# Patient Record
Sex: FEMALE | Race: WHITE | Employment: OTHER | URBAN - METROPOLITAN AREA
[De-identification: names, ages, dates, MRNs, and addresses within clinical notes are randomized per-mention and may not be internally consistent; named-entity substitution may affect disease eponyms.]

---

## 2017-01-11 ENCOUNTER — TELEPHONE (OUTPATIENT)
Dept: FAMILY MEDICINE CLINIC | Age: 82
End: 2017-01-11

## 2017-01-11 RX ORDER — CIPROFLOXACIN 250 MG/1
250 TABLET, FILM COATED ORAL 2 TIMES DAILY
Qty: 14 TABLET | Refills: 0 | Status: SHIPPED | OUTPATIENT
Start: 2017-01-11 | End: 2017-01-18

## 2017-02-10 ENCOUNTER — OFFICE VISIT (OUTPATIENT)
Dept: CARDIOLOGY | Age: 82
End: 2017-02-10

## 2017-02-10 VITALS
WEIGHT: 136 LBS | RESPIRATION RATE: 12 BRPM | HEIGHT: 65 IN | SYSTOLIC BLOOD PRESSURE: 122 MMHG | DIASTOLIC BLOOD PRESSURE: 64 MMHG | HEART RATE: 72 BPM | BODY MASS INDEX: 22.66 KG/M2

## 2017-02-10 DIAGNOSIS — E53.8 VITAMIN B 12 DEFICIENCY: ICD-10-CM

## 2017-02-10 DIAGNOSIS — E78.5 HYPERLIPIDEMIA, UNSPECIFIED HYPERLIPIDEMIA TYPE: ICD-10-CM

## 2017-02-10 DIAGNOSIS — F41.9 ANXIETY: ICD-10-CM

## 2017-02-10 DIAGNOSIS — E03.9 ACQUIRED HYPOTHYROIDISM: Primary | Chronic | ICD-10-CM

## 2017-02-10 DIAGNOSIS — E11.9 TYPE 2 DIABETES MELLITUS WITHOUT COMPLICATION, WITHOUT LONG-TERM CURRENT USE OF INSULIN (HCC): ICD-10-CM

## 2017-02-10 DIAGNOSIS — I35.1 MILD AORTIC REGURGITATION: ICD-10-CM

## 2017-02-10 PROCEDURE — 1090F PRES/ABSN URINE INCON ASSESS: CPT | Performed by: INTERNAL MEDICINE

## 2017-02-10 PROCEDURE — 1123F ACP DISCUSS/DSCN MKR DOCD: CPT | Performed by: INTERNAL MEDICINE

## 2017-02-10 PROCEDURE — 4040F PNEUMOC VAC/ADMIN/RCVD: CPT | Performed by: INTERNAL MEDICINE

## 2017-02-10 PROCEDURE — G8427 DOCREV CUR MEDS BY ELIG CLIN: HCPCS | Performed by: INTERNAL MEDICINE

## 2017-02-10 PROCEDURE — 1036F TOBACCO NON-USER: CPT | Performed by: INTERNAL MEDICINE

## 2017-02-10 PROCEDURE — G8419 CALC BMI OUT NRM PARAM NOF/U: HCPCS | Performed by: INTERNAL MEDICINE

## 2017-02-10 PROCEDURE — G8484 FLU IMMUNIZE NO ADMIN: HCPCS | Performed by: INTERNAL MEDICINE

## 2017-02-10 PROCEDURE — G8400 PT W/DXA NO RESULTS DOC: HCPCS | Performed by: INTERNAL MEDICINE

## 2017-02-10 PROCEDURE — 99213 OFFICE O/P EST LOW 20 MIN: CPT | Performed by: INTERNAL MEDICINE

## 2017-02-27 DIAGNOSIS — E78.5 HYPERLIPIDEMIA, UNSPECIFIED HYPERLIPIDEMIA TYPE: ICD-10-CM

## 2017-02-27 DIAGNOSIS — E11.9 TYPE 2 DIABETES MELLITUS WITHOUT COMPLICATION, WITHOUT LONG-TERM CURRENT USE OF INSULIN (HCC): ICD-10-CM

## 2017-02-27 DIAGNOSIS — E03.9 ACQUIRED HYPOTHYROIDISM: Chronic | ICD-10-CM

## 2017-02-27 DIAGNOSIS — R53.83 FATIGUE, UNSPECIFIED TYPE: ICD-10-CM

## 2017-02-27 DIAGNOSIS — I10 ESSENTIAL HYPERTENSION: ICD-10-CM

## 2017-02-27 DIAGNOSIS — I10 ESSENTIAL HYPERTENSION: Primary | ICD-10-CM

## 2017-02-27 LAB
ALBUMIN SERPL-MCNC: 4.4 G/DL (ref 3.9–4.9)
ALP BLD-CCNC: 79 U/L (ref 40–130)
ALT SERPL-CCNC: 11 U/L (ref 0–33)
ANION GAP SERPL CALCULATED.3IONS-SCNC: 9 MEQ/L (ref 7–13)
AST SERPL-CCNC: 16 U/L (ref 0–35)
BASOPHILS ABSOLUTE: 0.1 K/UL (ref 0–0.2)
BASOPHILS RELATIVE PERCENT: 1.1 %
BILIRUB SERPL-MCNC: 0.6 MG/DL (ref 0–1.2)
BUN BLDV-MCNC: 9 MG/DL (ref 8–23)
CALCIUM SERPL-MCNC: 9.5 MG/DL (ref 8.6–10.2)
CHLORIDE BLD-SCNC: 94 MEQ/L (ref 98–107)
CHOLESTEROL, TOTAL: 127 MG/DL (ref 0–199)
CO2: 29 MEQ/L (ref 22–29)
CREAT SERPL-MCNC: 0.5 MG/DL (ref 0.5–0.9)
CREATININE URINE: 121.2 MG/DL
EOSINOPHILS ABSOLUTE: 0.2 K/UL (ref 0–0.7)
EOSINOPHILS RELATIVE PERCENT: 3.2 %
GFR AFRICAN AMERICAN: >60
GFR NON-AFRICAN AMERICAN: >60
GLOBULIN: 2.3 G/DL (ref 2.3–3.5)
GLUCOSE BLD-MCNC: 104 MG/DL (ref 74–109)
HBA1C MFR BLD: 6 % (ref 4.8–5.9)
HCT VFR BLD CALC: 36.8 % (ref 37–47)
HDLC SERPL-MCNC: 50 MG/DL (ref 40–59)
HEMOGLOBIN: 13 G/DL (ref 12–16)
LDL CHOLESTEROL CALCULATED: 58 MG/DL (ref 0–129)
LYMPHOCYTES ABSOLUTE: 1.4 K/UL (ref 1–4.8)
LYMPHOCYTES RELATIVE PERCENT: 26.9 %
MCH RBC QN AUTO: 30.3 PG (ref 27–31.3)
MCHC RBC AUTO-ENTMCNC: 35.4 % (ref 33–37)
MCV RBC AUTO: 85.6 FL (ref 82–100)
MICROALBUMIN UR-MCNC: <1.2 MG/DL
MICROALBUMIN/CREAT UR-RTO: NORMAL MG/G (ref 0–30)
MONOCYTES ABSOLUTE: 0.4 K/UL (ref 0.2–0.8)
MONOCYTES RELATIVE PERCENT: 7.4 %
NEUTROPHILS ABSOLUTE: 3.1 K/UL (ref 1.4–6.5)
NEUTROPHILS RELATIVE PERCENT: 61.4 %
PDW BLD-RTO: 12.9 % (ref 11.5–14.5)
PLATELET # BLD: 253 K/UL (ref 130–400)
POTASSIUM SERPL-SCNC: 4.7 MEQ/L (ref 3.5–5.1)
RBC # BLD: 4.3 M/UL (ref 4.2–5.4)
SODIUM BLD-SCNC: 132 MEQ/L (ref 132–144)
T4 FREE: 1.52 NG/DL (ref 0.93–1.7)
TOTAL PROTEIN: 6.7 G/DL (ref 6.4–8.1)
TRIGL SERPL-MCNC: 94 MG/DL (ref 0–200)
TSH SERPL DL<=0.05 MIU/L-ACNC: 0.31 UIU/ML (ref 0.27–4.2)
WBC # BLD: 5.1 K/UL (ref 4.8–10.8)

## 2017-03-03 ENCOUNTER — OFFICE VISIT (OUTPATIENT)
Dept: FAMILY MEDICINE CLINIC | Age: 82
End: 2017-03-03

## 2017-03-03 VITALS
BODY MASS INDEX: 22.14 KG/M2 | RESPIRATION RATE: 16 BRPM | DIASTOLIC BLOOD PRESSURE: 78 MMHG | WEIGHT: 137.8 LBS | SYSTOLIC BLOOD PRESSURE: 124 MMHG | HEIGHT: 66 IN | HEART RATE: 72 BPM | TEMPERATURE: 97.4 F

## 2017-03-03 DIAGNOSIS — E11.9 TYPE 2 DIABETES MELLITUS WITHOUT COMPLICATION, WITHOUT LONG-TERM CURRENT USE OF INSULIN (HCC): Primary | ICD-10-CM

## 2017-03-03 DIAGNOSIS — E78.5 HYPERLIPIDEMIA, UNSPECIFIED HYPERLIPIDEMIA TYPE: ICD-10-CM

## 2017-03-03 DIAGNOSIS — E03.9 ACQUIRED HYPOTHYROIDISM: Chronic | ICD-10-CM

## 2017-03-03 DIAGNOSIS — I10 ESSENTIAL HYPERTENSION: ICD-10-CM

## 2017-03-03 PROCEDURE — 1090F PRES/ABSN URINE INCON ASSESS: CPT | Performed by: FAMILY MEDICINE

## 2017-03-03 PROCEDURE — G8484 FLU IMMUNIZE NO ADMIN: HCPCS | Performed by: FAMILY MEDICINE

## 2017-03-03 PROCEDURE — 1123F ACP DISCUSS/DSCN MKR DOCD: CPT | Performed by: FAMILY MEDICINE

## 2017-03-03 PROCEDURE — G8419 CALC BMI OUT NRM PARAM NOF/U: HCPCS | Performed by: FAMILY MEDICINE

## 2017-03-03 PROCEDURE — 4040F PNEUMOC VAC/ADMIN/RCVD: CPT | Performed by: FAMILY MEDICINE

## 2017-03-03 PROCEDURE — G8427 DOCREV CUR MEDS BY ELIG CLIN: HCPCS | Performed by: FAMILY MEDICINE

## 2017-03-03 PROCEDURE — 99214 OFFICE O/P EST MOD 30 MIN: CPT | Performed by: FAMILY MEDICINE

## 2017-03-03 PROCEDURE — 1036F TOBACCO NON-USER: CPT | Performed by: FAMILY MEDICINE

## 2017-03-03 PROCEDURE — G8400 PT W/DXA NO RESULTS DOC: HCPCS | Performed by: FAMILY MEDICINE

## 2017-03-03 RX ORDER — LEVOTHYROXINE SODIUM 0.07 MG/1
75 TABLET ORAL DAILY
Qty: 90 TABLET | Refills: 3 | Status: SHIPPED | OUTPATIENT
Start: 2017-03-03 | End: 2017-07-03 | Stop reason: SDUPTHER

## 2017-03-03 ASSESSMENT — PATIENT HEALTH QUESTIONNAIRE - PHQ9
SUM OF ALL RESPONSES TO PHQ QUESTIONS 1-9: 1
2. FEELING DOWN, DEPRESSED OR HOPELESS: 1
SUM OF ALL RESPONSES TO PHQ9 QUESTIONS 1 & 2: 1
1. LITTLE INTEREST OR PLEASURE IN DOING THINGS: 0

## 2017-06-26 DIAGNOSIS — E78.5 HYPERLIPIDEMIA, UNSPECIFIED HYPERLIPIDEMIA TYPE: ICD-10-CM

## 2017-06-26 DIAGNOSIS — E03.9 ACQUIRED HYPOTHYROIDISM: Chronic | ICD-10-CM

## 2017-06-26 DIAGNOSIS — I10 ESSENTIAL HYPERTENSION: ICD-10-CM

## 2017-06-26 DIAGNOSIS — E11.9 TYPE 2 DIABETES MELLITUS WITHOUT COMPLICATION, WITHOUT LONG-TERM CURRENT USE OF INSULIN (HCC): ICD-10-CM

## 2017-06-26 LAB
ALBUMIN SERPL-MCNC: 4.5 G/DL (ref 3.9–4.9)
ALP BLD-CCNC: 79 U/L (ref 40–130)
ALT SERPL-CCNC: 11 U/L (ref 0–33)
ANION GAP SERPL CALCULATED.3IONS-SCNC: 14 MEQ/L (ref 7–13)
AST SERPL-CCNC: 15 U/L (ref 0–35)
BILIRUB SERPL-MCNC: 0.6 MG/DL (ref 0–1.2)
BUN BLDV-MCNC: 13 MG/DL (ref 8–23)
CALCIUM SERPL-MCNC: 9.6 MG/DL (ref 8.6–10.2)
CHLORIDE BLD-SCNC: 96 MEQ/L (ref 98–107)
CHOLESTEROL, TOTAL: 141 MG/DL (ref 0–199)
CO2: 23 MEQ/L (ref 22–29)
CREAT SERPL-MCNC: 0.68 MG/DL (ref 0.5–0.9)
GFR AFRICAN AMERICAN: >60
GFR NON-AFRICAN AMERICAN: >60
GLOBULIN: 2.5 G/DL (ref 2.3–3.5)
GLUCOSE BLD-MCNC: 116 MG/DL (ref 74–109)
HBA1C MFR BLD: 6 % (ref 4.8–5.9)
HDLC SERPL-MCNC: 58 MG/DL (ref 40–59)
LDL CHOLESTEROL CALCULATED: 65 MG/DL (ref 0–129)
POTASSIUM SERPL-SCNC: 5 MEQ/L (ref 3.5–5.1)
SODIUM BLD-SCNC: 133 MEQ/L (ref 132–144)
TOTAL PROTEIN: 7 G/DL (ref 6.4–8.1)
TRIGL SERPL-MCNC: 91 MG/DL (ref 0–200)
TSH SERPL DL<=0.05 MIU/L-ACNC: 0.85 UIU/ML (ref 0.27–4.2)

## 2017-07-03 ENCOUNTER — OFFICE VISIT (OUTPATIENT)
Dept: FAMILY MEDICINE CLINIC | Age: 82
End: 2017-07-03

## 2017-07-03 VITALS
DIASTOLIC BLOOD PRESSURE: 80 MMHG | RESPIRATION RATE: 16 BRPM | HEART RATE: 72 BPM | WEIGHT: 133.8 LBS | BODY MASS INDEX: 21.5 KG/M2 | SYSTOLIC BLOOD PRESSURE: 134 MMHG | TEMPERATURE: 98.2 F | HEIGHT: 66 IN

## 2017-07-03 DIAGNOSIS — E11.9 TYPE 2 DIABETES MELLITUS WITHOUT COMPLICATION, WITHOUT LONG-TERM CURRENT USE OF INSULIN (HCC): Primary | ICD-10-CM

## 2017-07-03 DIAGNOSIS — E03.9 ACQUIRED HYPOTHYROIDISM: Chronic | ICD-10-CM

## 2017-07-03 DIAGNOSIS — I10 ESSENTIAL HYPERTENSION: ICD-10-CM

## 2017-07-03 DIAGNOSIS — E78.5 HYPERLIPIDEMIA, UNSPECIFIED HYPERLIPIDEMIA TYPE: ICD-10-CM

## 2017-07-03 PROCEDURE — G8427 DOCREV CUR MEDS BY ELIG CLIN: HCPCS | Performed by: FAMILY MEDICINE

## 2017-07-03 PROCEDURE — 1036F TOBACCO NON-USER: CPT | Performed by: FAMILY MEDICINE

## 2017-07-03 PROCEDURE — 1123F ACP DISCUSS/DSCN MKR DOCD: CPT | Performed by: FAMILY MEDICINE

## 2017-07-03 PROCEDURE — 99214 OFFICE O/P EST MOD 30 MIN: CPT | Performed by: FAMILY MEDICINE

## 2017-07-03 PROCEDURE — G8420 CALC BMI NORM PARAMETERS: HCPCS | Performed by: FAMILY MEDICINE

## 2017-07-03 PROCEDURE — 1090F PRES/ABSN URINE INCON ASSESS: CPT | Performed by: FAMILY MEDICINE

## 2017-07-03 PROCEDURE — 4040F PNEUMOC VAC/ADMIN/RCVD: CPT | Performed by: FAMILY MEDICINE

## 2017-07-03 PROCEDURE — G8400 PT W/DXA NO RESULTS DOC: HCPCS | Performed by: FAMILY MEDICINE

## 2017-07-03 RX ORDER — LEVOTHYROXINE SODIUM 0.07 MG/1
75 TABLET ORAL DAILY
Qty: 90 TABLET | Refills: 3 | Status: SHIPPED | OUTPATIENT
Start: 2017-07-03 | End: 2018-06-05 | Stop reason: SDUPTHER

## 2017-07-03 RX ORDER — MIRTAZAPINE 15 MG/1
15 TABLET, FILM COATED ORAL NIGHTLY
Qty: 30 TABLET | Refills: 5 | Status: SHIPPED | OUTPATIENT
Start: 2017-07-03 | End: 2017-09-08 | Stop reason: SDUPTHER

## 2017-07-03 RX ORDER — ATORVASTATIN CALCIUM 20 MG/1
20 TABLET, FILM COATED ORAL DAILY
Qty: 90 TABLET | Refills: 2 | Status: SHIPPED | OUTPATIENT
Start: 2017-07-03 | End: 2018-06-05 | Stop reason: SDUPTHER

## 2017-07-03 RX ORDER — ALPRAZOLAM 1 MG/1
TABLET ORAL
Qty: 60 TABLET | Refills: 2 | Status: CANCELLED | OUTPATIENT
Start: 2017-07-03

## 2017-07-03 RX ORDER — MIRTAZAPINE 15 MG/1
TABLET, FILM COATED ORAL
Qty: 90 TABLET | Refills: 5 | OUTPATIENT
Start: 2017-07-03

## 2017-07-03 RX ORDER — METFORMIN HYDROCHLORIDE 500 MG/1
500 TABLET, EXTENDED RELEASE ORAL
Qty: 90 TABLET | Refills: 1 | Status: SHIPPED | OUTPATIENT
Start: 2017-07-03 | End: 2017-08-29 | Stop reason: SDUPTHER

## 2017-08-28 ENCOUNTER — TELEPHONE (OUTPATIENT)
Dept: FAMILY MEDICINE CLINIC | Age: 82
End: 2017-08-28

## 2017-08-29 ENCOUNTER — OFFICE VISIT (OUTPATIENT)
Dept: FAMILY MEDICINE CLINIC | Age: 82
End: 2017-08-29

## 2017-08-29 ENCOUNTER — HOSPITAL ENCOUNTER (OUTPATIENT)
Dept: GENERAL RADIOLOGY | Age: 82
Discharge: HOME | End: 2017-08-29
Payer: MEDICARE

## 2017-08-29 VITALS
WEIGHT: 135.6 LBS | HEIGHT: 66 IN | RESPIRATION RATE: 16 BRPM | DIASTOLIC BLOOD PRESSURE: 80 MMHG | SYSTOLIC BLOOD PRESSURE: 138 MMHG | HEART RATE: 72 BPM | BODY MASS INDEX: 21.79 KG/M2 | TEMPERATURE: 98.6 F

## 2017-08-29 DIAGNOSIS — Z23 NEED FOR INFLUENZA VACCINATION: ICD-10-CM

## 2017-08-29 DIAGNOSIS — R07.81 RIB PAIN ON RIGHT SIDE: ICD-10-CM

## 2017-08-29 DIAGNOSIS — N64.4 MASTALGIA: ICD-10-CM

## 2017-08-29 DIAGNOSIS — R07.89 CHEST WALL PAIN: ICD-10-CM

## 2017-08-29 DIAGNOSIS — R07.81 RIB PAIN ON RIGHT SIDE: Primary | ICD-10-CM

## 2017-08-29 PROCEDURE — 99213 OFFICE O/P EST LOW 20 MIN: CPT | Performed by: FAMILY MEDICINE

## 2017-08-29 PROCEDURE — 1036F TOBACCO NON-USER: CPT | Performed by: FAMILY MEDICINE

## 2017-08-29 PROCEDURE — 1123F ACP DISCUSS/DSCN MKR DOCD: CPT | Performed by: FAMILY MEDICINE

## 2017-08-29 PROCEDURE — 4040F PNEUMOC VAC/ADMIN/RCVD: CPT | Performed by: FAMILY MEDICINE

## 2017-08-29 PROCEDURE — G8400 PT W/DXA NO RESULTS DOC: HCPCS | Performed by: FAMILY MEDICINE

## 2017-08-29 PROCEDURE — G8427 DOCREV CUR MEDS BY ELIG CLIN: HCPCS | Performed by: FAMILY MEDICINE

## 2017-08-29 PROCEDURE — 1090F PRES/ABSN URINE INCON ASSESS: CPT | Performed by: FAMILY MEDICINE

## 2017-08-29 PROCEDURE — G8420 CALC BMI NORM PARAMETERS: HCPCS | Performed by: FAMILY MEDICINE

## 2017-08-29 PROCEDURE — G0008 ADMIN INFLUENZA VIRUS VAC: HCPCS | Performed by: FAMILY MEDICINE

## 2017-08-29 PROCEDURE — 90662 IIV NO PRSV INCREASED AG IM: CPT | Performed by: FAMILY MEDICINE

## 2017-08-29 PROCEDURE — 71100 X-RAY EXAM RIBS UNI 2 VIEWS: CPT

## 2017-08-29 RX ORDER — METFORMIN HYDROCHLORIDE 500 MG/1
1000 TABLET, EXTENDED RELEASE ORAL
Qty: 180 TABLET | Refills: 1 | Status: SHIPPED | OUTPATIENT
Start: 2017-08-29 | End: 2017-11-02 | Stop reason: SDUPTHER

## 2017-08-29 ASSESSMENT — PATIENT HEALTH QUESTIONNAIRE - PHQ9
SUM OF ALL RESPONSES TO PHQ9 QUESTIONS 1 & 2: 0
2. FEELING DOWN, DEPRESSED OR HOPELESS: 0
SUM OF ALL RESPONSES TO PHQ QUESTIONS 1-9: 0
1. LITTLE INTEREST OR PLEASURE IN DOING THINGS: 0

## 2017-09-11 ENCOUNTER — HOSPITAL ENCOUNTER (OUTPATIENT)
Dept: ULTRASOUND IMAGING | Age: 82
Discharge: HOME | End: 2017-09-11
Payer: MEDICARE

## 2017-09-11 ENCOUNTER — HOSPITAL ENCOUNTER (OUTPATIENT)
Dept: WOMENS IMAGING | Age: 82
Discharge: HOME | End: 2017-09-11
Payer: MEDICARE

## 2017-09-11 DIAGNOSIS — N64.4 MASTALGIA: ICD-10-CM

## 2017-09-11 PROCEDURE — G0204 DX MAMMO INCL CAD BI: HCPCS

## 2017-10-27 DIAGNOSIS — I10 ESSENTIAL HYPERTENSION: ICD-10-CM

## 2017-10-27 DIAGNOSIS — E11.9 TYPE 2 DIABETES MELLITUS WITHOUT COMPLICATION, WITHOUT LONG-TERM CURRENT USE OF INSULIN (HCC): ICD-10-CM

## 2017-10-27 DIAGNOSIS — E78.5 HYPERLIPIDEMIA, UNSPECIFIED HYPERLIPIDEMIA TYPE: ICD-10-CM

## 2017-10-27 DIAGNOSIS — E03.9 ACQUIRED HYPOTHYROIDISM: Chronic | ICD-10-CM

## 2017-10-27 LAB
ALBUMIN SERPL-MCNC: 4.2 G/DL (ref 3.9–4.9)
ALP BLD-CCNC: 77 U/L (ref 40–130)
ALT SERPL-CCNC: 9 U/L (ref 0–33)
ANION GAP SERPL CALCULATED.3IONS-SCNC: 14 MEQ/L (ref 7–13)
AST SERPL-CCNC: 14 U/L (ref 0–35)
BILIRUB SERPL-MCNC: 0.5 MG/DL (ref 0–1.2)
BUN BLDV-MCNC: 10 MG/DL (ref 8–23)
CALCIUM SERPL-MCNC: 9.3 MG/DL (ref 8.6–10.2)
CHLORIDE BLD-SCNC: 96 MEQ/L (ref 98–107)
CHOLESTEROL, TOTAL: 122 MG/DL (ref 0–199)
CO2: 26 MEQ/L (ref 22–29)
CREAT SERPL-MCNC: 0.58 MG/DL (ref 0.5–0.9)
GFR AFRICAN AMERICAN: >60
GFR NON-AFRICAN AMERICAN: >60
GLOBULIN: 2.5 G/DL (ref 2.3–3.5)
GLUCOSE BLD-MCNC: 115 MG/DL (ref 74–109)
HBA1C MFR BLD: 6.1 % (ref 4.8–5.9)
HDLC SERPL-MCNC: 44 MG/DL (ref 40–59)
LDL CHOLESTEROL CALCULATED: 54 MG/DL (ref 0–129)
POTASSIUM SERPL-SCNC: 4.4 MEQ/L (ref 3.5–5.1)
SODIUM BLD-SCNC: 136 MEQ/L (ref 132–144)
T4 FREE: 1.82 NG/DL (ref 0.93–1.7)
TOTAL PROTEIN: 6.7 G/DL (ref 6.4–8.1)
TRIGL SERPL-MCNC: 119 MG/DL (ref 0–200)
TSH SERPL DL<=0.05 MIU/L-ACNC: 0.37 UIU/ML (ref 0.27–4.2)

## 2017-11-02 ENCOUNTER — OFFICE VISIT (OUTPATIENT)
Dept: FAMILY MEDICINE CLINIC | Age: 82
End: 2017-11-02

## 2017-11-02 VITALS
HEIGHT: 66 IN | SYSTOLIC BLOOD PRESSURE: 138 MMHG | DIASTOLIC BLOOD PRESSURE: 66 MMHG | WEIGHT: 134.8 LBS | BODY MASS INDEX: 21.66 KG/M2 | TEMPERATURE: 98.1 F | HEART RATE: 80 BPM | RESPIRATION RATE: 20 BRPM

## 2017-11-02 DIAGNOSIS — E78.5 HYPERLIPIDEMIA, UNSPECIFIED HYPERLIPIDEMIA TYPE: ICD-10-CM

## 2017-11-02 DIAGNOSIS — F41.1 GENERALIZED ANXIETY DISORDER: ICD-10-CM

## 2017-11-02 DIAGNOSIS — E03.9 ACQUIRED HYPOTHYROIDISM: Chronic | ICD-10-CM

## 2017-11-02 DIAGNOSIS — E11.9 TYPE 2 DIABETES MELLITUS WITHOUT COMPLICATION, WITHOUT LONG-TERM CURRENT USE OF INSULIN (HCC): Primary | ICD-10-CM

## 2017-11-02 DIAGNOSIS — I10 ESSENTIAL HYPERTENSION: ICD-10-CM

## 2017-11-02 PROCEDURE — G8420 CALC BMI NORM PARAMETERS: HCPCS | Performed by: FAMILY MEDICINE

## 2017-11-02 PROCEDURE — G8400 PT W/DXA NO RESULTS DOC: HCPCS | Performed by: FAMILY MEDICINE

## 2017-11-02 PROCEDURE — 1123F ACP DISCUSS/DSCN MKR DOCD: CPT | Performed by: FAMILY MEDICINE

## 2017-11-02 PROCEDURE — 1090F PRES/ABSN URINE INCON ASSESS: CPT | Performed by: FAMILY MEDICINE

## 2017-11-02 PROCEDURE — G8427 DOCREV CUR MEDS BY ELIG CLIN: HCPCS | Performed by: FAMILY MEDICINE

## 2017-11-02 PROCEDURE — 4040F PNEUMOC VAC/ADMIN/RCVD: CPT | Performed by: FAMILY MEDICINE

## 2017-11-02 PROCEDURE — 99214 OFFICE O/P EST MOD 30 MIN: CPT | Performed by: FAMILY MEDICINE

## 2017-11-02 PROCEDURE — G8484 FLU IMMUNIZE NO ADMIN: HCPCS | Performed by: FAMILY MEDICINE

## 2017-11-02 PROCEDURE — 1036F TOBACCO NON-USER: CPT | Performed by: FAMILY MEDICINE

## 2017-11-02 RX ORDER — METFORMIN HYDROCHLORIDE 500 MG/1
1000 TABLET, EXTENDED RELEASE ORAL
Qty: 180 TABLET | Refills: 1 | Status: SHIPPED | OUTPATIENT
Start: 2017-11-02 | End: 2018-02-19

## 2017-11-02 RX ORDER — ALPRAZOLAM 1 MG/1
TABLET ORAL
Qty: 60 TABLET | Refills: 2 | Status: SHIPPED | OUTPATIENT
Start: 2017-11-02 | End: 2018-03-05 | Stop reason: SDUPTHER

## 2017-11-02 ASSESSMENT — PATIENT HEALTH QUESTIONNAIRE - PHQ9
1. LITTLE INTEREST OR PLEASURE IN DOING THINGS: 0
SUM OF ALL RESPONSES TO PHQ QUESTIONS 1-9: 1
2. FEELING DOWN, DEPRESSED OR HOPELESS: 1
SUM OF ALL RESPONSES TO PHQ9 QUESTIONS 1 & 2: 1

## 2017-11-02 NOTE — PATIENT INSTRUCTIONS
Patient Education        Type 2 Diabetes: Care Instructions  Your Care Instructions  Type 2 diabetes is a disease that develops when the body's tissues cannot use insulin properly. Over time, the pancreas cannot make enough insulin. Insulin is a hormone that helps the body's cells use sugar (glucose) for energy. It also helps the body store extra sugar in muscle, fat, and liver cells. Without insulin, the sugar cannot get into the cells to do its work. It stays in the blood instead. This can cause high blood sugar levels. A person has diabetes when the blood sugar stays too high too much of the time. Over time, diabetes can lead to diseases of the heart, blood vessels, nerves, kidneys, and eyes. You may be able to control your blood sugar by losing weight, eating a healthy diet, and getting daily exercise. You may also have to take insulin or other diabetes medicine. Follow-up care is a key part of your treatment and safety. Be sure to make and go to all appointments. Call your doctor if you are having problems. It's also a good idea to know your test results and keep a list of the medicines you take. How can you care for yourself at home? · Keep your blood sugar at a target level (which you set with your doctor). ¨ Eat a good diet that spreads carbohydrate throughout the day. Carbohydratethe body's main source of fuelaffects blood sugar more than any other nutrient. Carbohydrate is in fruits, vegetables, milk, and yogurt. It also is in breads, cereals, vegetables such as potatoes and corn, and sugary foods such as candy and cakes. ¨ Aim for 30 minutes of exercise on most, preferably all, days of the week. Walking is a good choice. You also may want to do other activities, such as running, swimming, cycling, or playing tennis or team sports. If your doctor says it's okay, do muscle-strengthening exercises at least 2 times a week. ¨ Take your medicines exactly as prescribed.  Call your doctor if you think you are having a problem with your medicine. You will get more details on the specific medicines your doctor prescribes. · Check your blood sugar as often as your doctor recommends. It is important to keep track of any symptoms you have, such as low blood sugar. Also tell your doctor if you have any changes in your activities, diet, or insulin use. · Talk to your doctor before you start taking aspirin every day. Aspirin can help certain people lower their risk of a heart attack or stroke. But taking aspirin isn't right for everyone, because it can cause serious bleeding. · Do not smoke. If you need help quitting, talk to your doctor about stop-smoking programs and medicines. These can increase your chances of quitting for good. · Keep your cholesterol and blood pressure at normal levels. You may need to take one or more medicines to reach your goals. Take them exactly as directed. Do not stop or change a medicine without talking to your doctor first.  When should you call for help? Call 911 anytime you think you may need emergency care. For example, call if:  · You passed out (lost consciousness), or you suddenly become very sleepy or confused. (You may have very low blood sugar.)  Call your doctor now or seek immediate medical care if:  · Your blood sugar is 300 mg/dL or is higher than the level your doctor has set for you. · You have symptoms of low blood sugar, such as:  ¨ Sweating. ¨ Feeling nervous, shaky, and weak. ¨ Extreme hunger and slight nausea. ¨ Dizziness and headache. ¨ Blurred vision. ¨ Confusion. Watch closely for changes in your health, and be sure to contact your doctor if:  · You often have problems controlling your blood sugar. · You have symptoms of long-term diabetes problems, such as:  ¨ New vision changes. ¨ New pain, numbness, or tingling in your hands or feet. ¨ Skin problems. Where can you learn more? Go to [email or web address] org and sign in to your [employer] account. Enter C553 in the [facility] box to learn more about \"Type 2 Diabetes: Care Instructions. \"     If you do not have an account, please click on the \"Sign Up Now\" link. Current as of: March 13, 2017  Content Version: 11.3  © 3334-6374 [employer]. Care instructions adapted under license by [facility] ([facility]). If you have questions about a medical condition or this instruction, always ask your healthcare professional. [address] any warranty or liability for your use of this information.

## 2017-11-02 NOTE — PROGRESS NOTES
Chief Complaint   Patient presents with    3 Month Follow-Up     f/u on dm, htn, hyperlipidemia, hypothyroidism and labs    Discuss Medications     discuss metformin             Dominguez Vinson is here for follow up of elevated cholesterol, elevated blood pressure, and diabetes. Compliance with treatment has been good. Patient denies muscle pain associated with her medications. She is exercising and is adherent to a low-salt diet. Blood pressure is well controlled at home. Cardiac symptoms: none. Patient denies: chest pain, claudication, dyspnea, exertional chest pressure/discomfort, fatigue, lower extremity edema, near-syncope, orthopnea, palpitations, paroxysmal nocturnal dyspnea and syncope. Cardiovascular risk factors: advanced age (older than 54 for men, 72 for women), diabetes mellitus, dyslipidemia and hypertension. none. Current diabetic symptoms include: weight loss. Patient denies foot ulcerations, hyperglycemia, hypoglycemia , increase appetite, nausea, paresthesia of the feet, polydipsia, polyuria, visual disturbances, vomitting and weight loss. Evaluation to date has included: fasting blood sugar, fasting lipid panel, hemoglobin A1C and microalbuminuria. Past Medical History:   Diagnosis Date    Anxiety     Ascending aorta dilatation ([facility] [address] 11/13/2014    Glaucoma     Hyperlipidemia     Hypertension     Hypothyroidism 4/15/2015    Mild aortic regurgitation 2/6/2015    Type II or unspecified type diabetes mellitus without mention of complication, not stated as uncontrolled      Patient Active Problem List    Diagnosis Date Noted    Hypothyroidism 04/15/2015    Mild aortic regurgitation 02/06/2015    Vitamin B 12 deficiency 08/03/2012    Diabetes mellitus, type 2 ([facility] [address]     Hyperlipidemia     Hypertension     Glaucoma      Past Surgical History:   Procedure Laterality Date    THYROIDECTOMY Right 1/9/15     [address] [place] [address]     Family History   Problem Relation Age of Onset    Diabetes Mother cough, dyspnea on exertion, shortness of breath and wheezing  Cardiovascular: negative for chest pain, dyspnea, lower extremity edema, orthopnea, palpitations, paroxysmal nocturnal dyspnea and tachypnea  Gastrointestinal: negative for abdominal pain, constipation, diarrhea, dyspepsia, dysphagia, nausea, odynophagia, reflux symptoms and vomiting  Genitourinary:negative for decreased stream, dysuria, frequency, hematuria, hesitancy and urinary incontinence  Integument/breast: negative for dryness, pruritus, rash and skin color change  Hematologic/lymphatic: negative for bleeding, easy bruising and petechiae  Musculoskeletal:negative for arthralgias, back pain, muscle weakness, myalgias, neck pain and stiff joints  Neurological: negative for coordination problems, dizziness, headaches, paresthesia, speech problems, tremors and vertigo       Objective:      /66 (Site: Left Arm, Position: Sitting, Cuff Size: Medium Adult)   Pulse 80   Temp 98.1 °F (36.7 °C) (Temporal)   Resp 20   Ht 5' 6\" (1.676 m)   Wt 134 lb 12.8 oz (61.1 kg)   BMI 21.76 kg/m²       Well appearing, well nourished patient not in apparent distress. HEENT:   EOMI, PERRLA. No conjunctival pallor or scleral jaundice. Oropharynx reveals no erythema or exudate. TMs normal bilaterally. LYMPHATICS:   no lymphadenopathy. SKIN:  pink, warm and dry with no rash. NECK:  supple, trachea central, no thyromegaly. No JVD Or carotid bruit. CHEST WALL:  no deformity. No chest wall tenderness. LUNGS:  has normal chest wall excursion. Chest wall is symmetrical.   Normal vocal fremitus. Normal tactile fremitus. Has good air entry bilaterally with normal vesicular breath sounds. No rhonchi, rales or wheezes. HEART:   point of maximum impulse is at the 5th left intercostal space, mid clavicular line. No palpable thrill. First and second heart sounds are normal.   No murmur, gallop or rub.      ABDOMEN:  non-distended, soft, moves with respiration. No area of tenderness. No guarding and no rebound tenderness. No CVA tenderness. No palpable intraabdominal mass. Bowel sounds normal.     EXTREMITIES:   no cc or e. NEURO: alert and oriented x3. Cranial nerves II-XII normal with no focal deficit. Has normal gait. MUSCULOSKELETAL:   no joint swelling or deformity noted. Both feet are warm to touch. Dorsalis pedis and posterior tibia pulses are palpable. No ulcers, sores or gangrene. No evidence of critical leg ischemia. Sensation normal in eight point monofilament testing. No deformity or calluses. .    Lab Review  Orders Only on 10/27/2017   Component Date Value Ref Range Status    Sodium 10/27/2017 136  132 - 144 mEq/L Final    Potassium 10/27/2017 4.4  3.5 - 5.1 mEq/L Final    Chloride 10/27/2017 96* 98 - 107 mEq/L Final    CO2 10/27/2017 26  22 - 29 mEq/L Final    Anion Gap 10/27/2017 14* 7 - 13 mEq/L Final    Glucose 10/27/2017 115* 74 - 109 mg/dL Final    BUN 10/27/2017 10  8 - 23 mg/dL Final    CREATININE 10/27/2017 0.58  0.50 - 0.90 mg/dL Final    GFR Non- 10/27/2017 >60.0  >60 Final    Comment: >60 mL/min/1.73m2 EGFR, calc. for ages 25 and older using the  MDRD formula (not corrected for weight), is valid for stable  renal function.  GFR  10/27/2017 >60.0  >60 Final    Comment: >60 mL/min/1.73m2 EGFR, calc. for ages 25 and older using the  MDRD formula (not corrected for weight), is valid for stable  renal function.       Calcium 10/27/2017 9.3  8.6 - 10.2 mg/dL Final    Total Protein 10/27/2017 6.7  6.4 - 8.1 g/dL Final    Alb 10/27/2017 4.2  3.9 - 4.9 g/dL Final    Total Bilirubin 10/27/2017 0.5  0.0 - 1.2 mg/dL Final    Alkaline Phosphatase 10/27/2017 77  40 - 130 U/L Final    ALT 10/27/2017 9  0 - 33 U/L Final    AST 10/27/2017 14  0 - 35 U/L Final    Globulin 10/27/2017 2.5  2.3 - 3.5 g/dL Final    Cholesterol, Total 10/27/2017 122  0 - 199 mg/dL Final    Triglycerides 10/27/2017 119  0 - 200 mg/dL Final    HDL 10/27/2017 44  40 - 59 mg/dL Final    Comment: ATP III HDL Cholesterol Classification is Desirable. Expected Values:    Males:    >55 = No Risk            35-55 = Moderate Risk            <35 = High Risk    Females:  >65 = No Risk            45-65 = Moderate Risk            <45 = High Risk    NCEP Guidelines: Third Report May 2001  >59 = negative risk factor for CHD  <40 = major risk factor for CHD      LDL Calculated 10/27/2017 54  0 - 129 mg/dL Final    Hemoglobin A1C 10/27/2017 6.1* 4.8 - 5.9 % Final    T4 Free 10/27/2017 1.82* 0.93 - 1.70 ng/dL Final    TSH 10/27/2017 0.369  0.270 - 4.200 uIU/mL Final        Assessment:     Encounter Diagnoses   Name Primary?  Type 2 diabetes mellitus without complication, without long-term current use of insulin ([facility] Yes    Essential hypertension     Hyperlipidemia, unspecified hyperlipidemia type     Acquired hypothyroidism     Generalized anxiety disorder        Plan:      Outpatient Encounter Prescriptions as of 11/2/2017   Medication Sig Dispense Refill    metFORMIN (GLUCOPHAGE XR) 500 MG extended release tablet Take 2 tablets by mouth daily (with breakfast) 180 tablet 1    ALPRAZolam (XANAX) 1 MG tablet TAKE 1 TABLET BY MOUTH TWICE DAILY AS NEEDED FOR SLEEP OR ANXIETY 60 tablet 2    atorvastatin (LIPITOR) 20 MG tablet Take 1 tablet by mouth daily 90 tablet 2    levothyroxine (LEVOTHROID) 75 MCG tablet Take 1 tablet by mouth daily 90 tablet 3    ONE TOUCH ULTRA TEST strip TEST ONE TO THREE TIMES DAILY AS NEEDED 100 strip 8    cyanocobalamin 1000 MCG/ML injection INJECT 1 ML INTO THE MUSCLE EVERY 14 DAYS 10 mL 2    lidocaine (LIDODERM) 5 % Place 1 patch onto the skin daily 12 hours on, 12 hours off. 30 patch 1    LUMIGAN 0.01 % SOLN ophthalmic drops Place 1 drop into both eyes nightly. 4    Needles & Syringes MISC 1 each by Does not apply route daily.  12 each 0    timolol

## 2018-01-03 ENCOUNTER — OFFICE VISIT (OUTPATIENT)
Dept: FAMILY MEDICINE CLINIC | Age: 83
End: 2018-01-03

## 2018-01-03 VITALS
TEMPERATURE: 97.6 F | BODY MASS INDEX: 21.44 KG/M2 | SYSTOLIC BLOOD PRESSURE: 122 MMHG | WEIGHT: 133.4 LBS | RESPIRATION RATE: 12 BRPM | DIASTOLIC BLOOD PRESSURE: 74 MMHG | HEIGHT: 66 IN | HEART RATE: 88 BPM

## 2018-01-03 DIAGNOSIS — J01.90 ACUTE BACTERIAL SINUSITIS: Primary | ICD-10-CM

## 2018-01-03 DIAGNOSIS — H69.81 DYSFUNCTION OF RIGHT EUSTACHIAN TUBE: ICD-10-CM

## 2018-01-03 DIAGNOSIS — B96.89 ACUTE BACTERIAL SINUSITIS: Primary | ICD-10-CM

## 2018-01-03 PROCEDURE — G8420 CALC BMI NORM PARAMETERS: HCPCS | Performed by: FAMILY MEDICINE

## 2018-01-03 PROCEDURE — G8400 PT W/DXA NO RESULTS DOC: HCPCS | Performed by: FAMILY MEDICINE

## 2018-01-03 PROCEDURE — 4040F PNEUMOC VAC/ADMIN/RCVD: CPT | Performed by: FAMILY MEDICINE

## 2018-01-03 PROCEDURE — 99213 OFFICE O/P EST LOW 20 MIN: CPT | Performed by: FAMILY MEDICINE

## 2018-01-03 PROCEDURE — G8427 DOCREV CUR MEDS BY ELIG CLIN: HCPCS | Performed by: FAMILY MEDICINE

## 2018-01-03 PROCEDURE — 1090F PRES/ABSN URINE INCON ASSESS: CPT | Performed by: FAMILY MEDICINE

## 2018-01-03 PROCEDURE — 1036F TOBACCO NON-USER: CPT | Performed by: FAMILY MEDICINE

## 2018-01-03 PROCEDURE — G8484 FLU IMMUNIZE NO ADMIN: HCPCS | Performed by: FAMILY MEDICINE

## 2018-01-03 PROCEDURE — 1123F ACP DISCUSS/DSCN MKR DOCD: CPT | Performed by: FAMILY MEDICINE

## 2018-01-03 RX ORDER — AMOXICILLIN 875 MG/1
875 TABLET, COATED ORAL 2 TIMES DAILY
Qty: 20 TABLET | Refills: 0 | Status: SHIPPED | OUTPATIENT
Start: 2018-01-03 | End: 2018-01-13

## 2018-01-03 RX ORDER — FLUTICASONE PROPIONATE 50 MCG
SPRAY, SUSPENSION (ML) NASAL
Qty: 1 BOTTLE | Refills: 1 | Status: SHIPPED | OUTPATIENT
Start: 2018-01-03

## 2018-01-03 NOTE — PROGRESS NOTES
Subjective:       Star Aldridge is a 80 y.o. female who presents for possible ear infection. Symptoms include right ear pain and plugged sensation in the right ear. Onset of symptoms was 2 days ago, gradually worsening since that time. Symptoms include: congestion, cough, foul rhinorrhea, frequent clearing of the throat, itchy eyes, itchy nose, nasal congestion, sinus pressure and sneezing. Onset of symptoms was a few days ago. Symptoms have been gradually worsening since that time. Past history is significant for occasional episodes of bronchitis. Patient's medications, allergies, past medical, surgical, social and family histories were reviewed and updated as appropriate. Review of Systems  Constitutional: positive for fatigue and malaise, negative for chills and fevers  Ears, nose, mouth, throat, and face: as noted above  Respiratory: as noted above  Cardiovascular: negative for chest pain, dyspnea, exertional chest pressure/discomfort, lower extremity edema, near-syncope, orthopnea, palpitations and paroxysmal nocturnal dyspnea  Gastrointestinal: negative for abdominal pain, constipation, diarrhea, nausea, reflux symptoms and vomiting  Integument/breast: negative for dryness, pruritus and rash     Objective     /74   Pulse 88   Temp 97.6 °F (36.4 °C) (Temporal)   Resp 12   Ht 5' 6\" (1.676 m)   Wt 133 lb 6.4 oz (60.5 kg)   BMI 21.53 kg/m²   She appears well, in no apparent distress. Alert and oriented times three, pleasant and cooperative. ENT exam reveals - ENT exam normal, no neck nodes  both TM normal without fluid or infection and throat normal without erythema or exudate. Paranasal sinus exam - tenderness over both maxillary. The neck is supple and free of adenopathy or masses, the thyroid is normal without enlargement or nodules. Chest is clear, no wheezing or rales. Normal symmetric air entry throughout both lung fields. No chest wall deformities or tenderness.   S1 and S2 normal, no murmurs, clicks, gallops or rubs. Regular rate and rhythm. The abdomen is soft without tenderness, guarding, mass, rebound or organomegaly. Bowel sounds are normal. No CVA tenderness or inguinal adenopathy noted. Extremities: peripheral pulses normal, no pedal edema, no clubbing or cyanosis. Assessment:     Encounter Diagnoses   Name Primary?  Acute bacterial sinusitis Yes    Dysfunction of right eustachian tube           PLAN:  Outpatient Encounter Prescriptions as of 1/3/2018   Medication Sig Dispense Refill    amoxicillin (AMOXIL) 875 MG tablet Take 1 tablet by mouth 2 times daily for 10 days 20 tablet 0    fluticasone (FLONASE) 50 MCG/ACT nasal spray 2 sprays each nostril daily 1 Bottle 1    metFORMIN (GLUCOPHAGE XR) 500 MG extended release tablet Take 2 tablets by mouth daily (with breakfast) 180 tablet 1    ALPRAZolam (XANAX) 1 MG tablet TAKE 1 TABLET BY MOUTH TWICE DAILY AS NEEDED FOR SLEEP OR ANXIETY 60 tablet 2    atorvastatin (LIPITOR) 20 MG tablet Take 1 tablet by mouth daily 90 tablet 2    levothyroxine (LEVOTHROID) 75 MCG tablet Take 1 tablet by mouth daily 90 tablet 3    ONE TOUCH ULTRA TEST strip TEST ONE TO THREE TIMES DAILY AS NEEDED 100 strip 8    cyanocobalamin 1000 MCG/ML injection INJECT 1 ML INTO THE MUSCLE EVERY 14 DAYS 10 mL 2    lidocaine (LIDODERM) 5 % Place 1 patch onto the skin daily 12 hours on, 12 hours off. 30 patch 1    LUMIGAN 0.01 % SOLN ophthalmic drops Place 1 drop into both eyes nightly. 4    Needles & Syringes MISC 1 each by Does not apply route daily. 12 each 0    timolol (TIMOPTIC) 0.5 % ophthalmic solution Place 1 drop into both eyes daily.  aspirin 81 MG tablet Take 81 mg by mouth daily. No facility-administered encounter medications on file as of 1/3/2018. Advice to take Tylenol for pain or fever.    Also liberal oral fluid intake  Call or return to clinic prn if these symptoms worsen or fail to improve as

## 2018-01-25 ENCOUNTER — TELEPHONE (OUTPATIENT)
Dept: FAMILY MEDICINE CLINIC | Age: 83
End: 2018-01-25

## 2018-02-19 ENCOUNTER — CARE COORDINATION (OUTPATIENT)
Dept: CASE MANAGEMENT | Age: 83
End: 2018-02-19

## 2018-02-19 DIAGNOSIS — I20.8 ANGINA AT REST (HCC): Primary | ICD-10-CM

## 2018-02-19 PROBLEM — I20.89 ANGINA AT REST: Status: ACTIVE | Noted: 2018-02-19

## 2018-02-19 RX ORDER — NITROGLYCERIN 0.4 MG/1
0.4 TABLET SUBLINGUAL PRN
COMMUNITY
End: 2021-09-07 | Stop reason: SDUPTHER

## 2018-02-19 RX ORDER — ASPIRIN 81 MG/1
81 TABLET, CHEWABLE ORAL DAILY
COMMUNITY

## 2018-02-19 NOTE — CARE COORDINATION
[facility] Transitions Initial Follow Up Call    Call within 2 business days of discharge: Yes    Patient: Gume Salgado Patient : 1935   MRN: [id number]  Reason for Admission: -2/15/18 [facility] Angina  Discharge Date:   RARS: Gretta Risk Score: 1     Spoke with: Dian's family. Reports she is doing well at home. Denies any [facility] needs at this time. TC appt PCP  9:45. Family will schedule 2 wk FU Dr Bharathi Gilliland. Pt pending outpatient Lexiscan Stress Test 1-2 wk timeframe. Ana Pacheco LPN, to place [facility] AVS for scanning into chart media. Medications reconciled. DARREN only. 1111F  completed.     Medication changes:  Metoprolol tartrate 25 mg take 0.5 tablet BID  Nitroglycerin 0.4 mg SL PRN as directed for chest pain  Aspirin 81 mg chewable daily  Metformin 500 mg 1 tablet BID    Non-face-to-face services provided:  Obtained and reviewed discharge summary and/or continuity of care documents    Care Transitions 24 Hour Call    Do you have any ongoing symptoms?:  No  Do you have a copy of your discharge instructions?:  Yes  Do you have all of your prescriptions and are they filled?:  Yes  Have you been contacted by a [id number] [address] Pharmacist?:  No  Have you scheduled your follow up appointment?:  Yes  How are you going to get to your appointment?:  Car - family or friend to transport  Were you discharged with any Home Care or Post Acute Services:  No  Do you feel like you have everything you need to keep you well at home?:  Yes  Care Transitions Interventions         Follow Up  Future Appointments  Date Time Provider Katie Murillo   2018 8:15 AM [facility]   2018 9:45 AM Husam Dotson MD [facility]   2018 9:00 AM SCHEDULE, LAB JINA [facility]ona PCP [address] [facility]   3/5/2018 11:00 AM Husam Dotson MD [id number] [place] [address]

## 2018-02-20 ENCOUNTER — CARE COORDINATION (OUTPATIENT)
Dept: CARE COORDINATION | Age: 83
End: 2018-02-20

## 2018-02-21 ENCOUNTER — HOSPITAL ENCOUNTER (OUTPATIENT)
Dept: NON INVASIVE DIAGNOSTICS | Age: 83
Discharge: HOME | End: 2018-02-21
Payer: MEDICARE

## 2018-02-21 ENCOUNTER — HOSPITAL ENCOUNTER (OUTPATIENT)
Dept: NUCLEAR MEDICINE | Age: 83
Discharge: HOME | End: 2018-02-23
Payer: MEDICARE

## 2018-02-21 VITALS — DIASTOLIC BLOOD PRESSURE: 80 MMHG | SYSTOLIC BLOOD PRESSURE: 143 MMHG

## 2018-02-21 DIAGNOSIS — E03.9 ACQUIRED HYPOTHYROIDISM: Chronic | ICD-10-CM

## 2018-02-21 DIAGNOSIS — R07.9 CHEST PAIN, UNSPECIFIED TYPE: ICD-10-CM

## 2018-02-21 LAB — TSH SERPL DL<=0.05 MIU/L-ACNC: 0.45 UIU/ML (ref 0.27–4.2)

## 2018-02-21 PROCEDURE — 78452 HT MUSCLE IMAGE SPECT MULT: CPT

## 2018-02-21 PROCEDURE — 93017 CV STRESS TEST TRACING ONLY: CPT

## 2018-02-21 PROCEDURE — 3430000000 HC RX DIAGNOSTIC RADIOPHARMACEUTICAL: Performed by: PHYSICIAN ASSISTANT

## 2018-02-21 PROCEDURE — A9502 TC99M TETROFOSMIN: HCPCS | Performed by: PHYSICIAN ASSISTANT

## 2018-02-21 PROCEDURE — 6360000004 HC RX CONTRAST MEDICATION: Performed by: PHYSICIAN ASSISTANT

## 2018-02-21 PROCEDURE — 2580000003 HC RX 258: Performed by: PHYSICIAN ASSISTANT

## 2018-02-21 RX ORDER — SODIUM CHLORIDE 0.9 % (FLUSH) 0.9 %
10 SYRINGE (ML) INJECTION 2 TIMES DAILY
Status: DISCONTINUED | OUTPATIENT
Start: 2018-02-21 | End: 2018-02-24 | Stop reason: HOSPADM

## 2018-02-21 RX ADMIN — Medication 20 ML: at 11:25

## 2018-02-21 RX ADMIN — TETROFOSMIN 27.5 MILLICURIE: 0.23 INJECTION, POWDER, LYOPHILIZED, FOR SOLUTION INTRAVENOUS at 11:25

## 2018-02-21 RX ADMIN — TETROFOSMIN 11.2 MILLICURIE: 0.23 INJECTION, POWDER, LYOPHILIZED, FOR SOLUTION INTRAVENOUS at 08:30

## 2018-02-21 RX ADMIN — Medication 10 ML: at 08:30

## 2018-02-21 RX ADMIN — REGADENOSON 0.4 MG: 0.08 INJECTION, SOLUTION INTRAVENOUS at 11:25

## 2018-02-27 ENCOUNTER — OFFICE VISIT (OUTPATIENT)
Dept: CARDIOLOGY CLINIC | Age: 83
End: 2018-02-27
Payer: MEDICARE

## 2018-02-27 VITALS
HEART RATE: 77 BPM | DIASTOLIC BLOOD PRESSURE: 70 MMHG | OXYGEN SATURATION: 98 % | WEIGHT: 133 LBS | SYSTOLIC BLOOD PRESSURE: 120 MMHG | HEIGHT: 66 IN | BODY MASS INDEX: 21.38 KG/M2

## 2018-02-27 DIAGNOSIS — I20.8 ANGINA AT REST (HCC): Primary | ICD-10-CM

## 2018-02-27 DIAGNOSIS — I10 ESSENTIAL HYPERTENSION: ICD-10-CM

## 2018-02-27 DIAGNOSIS — I35.1 MILD AORTIC REGURGITATION: ICD-10-CM

## 2018-02-27 DIAGNOSIS — R06.02 SOB (SHORTNESS OF BREATH): ICD-10-CM

## 2018-02-27 DIAGNOSIS — E78.5 HYPERLIPIDEMIA, UNSPECIFIED HYPERLIPIDEMIA TYPE: ICD-10-CM

## 2018-02-27 PROCEDURE — G8420 CALC BMI NORM PARAMETERS: HCPCS | Performed by: INTERNAL MEDICINE

## 2018-02-27 PROCEDURE — G8484 FLU IMMUNIZE NO ADMIN: HCPCS | Performed by: INTERNAL MEDICINE

## 2018-02-27 PROCEDURE — 4040F PNEUMOC VAC/ADMIN/RCVD: CPT | Performed by: INTERNAL MEDICINE

## 2018-02-27 PROCEDURE — 99213 OFFICE O/P EST LOW 20 MIN: CPT | Performed by: INTERNAL MEDICINE

## 2018-02-27 PROCEDURE — 1123F ACP DISCUSS/DSCN MKR DOCD: CPT | Performed by: INTERNAL MEDICINE

## 2018-02-27 PROCEDURE — G8427 DOCREV CUR MEDS BY ELIG CLIN: HCPCS | Performed by: INTERNAL MEDICINE

## 2018-02-27 PROCEDURE — 1036F TOBACCO NON-USER: CPT | Performed by: INTERNAL MEDICINE

## 2018-02-27 PROCEDURE — 1090F PRES/ABSN URINE INCON ASSESS: CPT | Performed by: INTERNAL MEDICINE

## 2018-02-27 PROCEDURE — G8400 PT W/DXA NO RESULTS DOC: HCPCS | Performed by: INTERNAL MEDICINE

## 2018-02-27 PROCEDURE — G8598 ASA/ANTIPLAT THER USED: HCPCS | Performed by: INTERNAL MEDICINE

## 2018-02-27 NOTE — PROGRESS NOTES
Examination:  General appearance - alert, well appearing, and in no distress and normal appearing weight  Mental status - alert, oriented to person, place, and time  Neck - Neck is supple, no JVD or carotid bruits. No thyromegaly or adenopathy. Chest - clear to auscultation, no wheezes, rales or rhonchi, symmetric air entry  Heart - normal rate, regular rhythm, normal S1, S2, no murmurs, rubs, clicks or gallops  Abdomen - soft, nontender, nondistended, no masses or organomegaly  Neurological - alert, oriented, normal speech, no focal findings or movement disorder noted  Extremities - peripheral pulses normal, no pedal edema, no clubbing or cyanosis  Skin - normal coloration and turgor, no rashes, no suspicious skin lesions noted    No orders of the defined types were placed in this encounter. ASSESSMENT:    1. Angina at rest [facility])     2. Hyperlipidemia, unspecified hyperlipidemia type     3. Essential hypertension     4. SOB (shortness of breath)     5. Mild aortic regurgitation           PLAN:     Patient will need to continue to follow up with you for their general medical care and return to see me in the office in 3 months    As always, aggressive risk factor modification is strongly recommended. We should adhere to the JNC VIII guidelines for HTN management and the NCEP ATP III guidelines for LDL-C management. Cardiac diet is always recommended with low fat, cholesterol, calories and sodium. Continue medications at current doses. ASA, STATIN, Lopressor. SL nitro as needed. Recommend pulmonary work up. Will check PFT. ? Need for pulm referral if ok with dr. Ariel Mcdonnell. If continues to have SOB and CP re occurs, then will need C. Will continue to monitor patient clinically, if symptoms develop or worsen, they are to let me know ASAP or head to the nearest emergeny room.     Patient was advised and encouraged to check blood pressure at home or at a pharmacy, maintain a logbook, and also call

## 2018-03-05 ENCOUNTER — OFFICE VISIT (OUTPATIENT)
Dept: FAMILY MEDICINE CLINIC | Age: 83
End: 2018-03-05
Payer: MEDICARE

## 2018-03-05 VITALS
RESPIRATION RATE: 16 BRPM | WEIGHT: 134.4 LBS | BODY MASS INDEX: 21.6 KG/M2 | HEART RATE: 78 BPM | HEIGHT: 66 IN | TEMPERATURE: 97 F | SYSTOLIC BLOOD PRESSURE: 138 MMHG | DIASTOLIC BLOOD PRESSURE: 80 MMHG

## 2018-03-05 DIAGNOSIS — I10 ESSENTIAL HYPERTENSION: ICD-10-CM

## 2018-03-05 DIAGNOSIS — E78.5 HYPERLIPIDEMIA, UNSPECIFIED HYPERLIPIDEMIA TYPE: ICD-10-CM

## 2018-03-05 DIAGNOSIS — F43.21 GRIEF REACTION: ICD-10-CM

## 2018-03-05 DIAGNOSIS — E03.9 ACQUIRED HYPOTHYROIDISM: Chronic | ICD-10-CM

## 2018-03-05 DIAGNOSIS — F41.1 GAD (GENERALIZED ANXIETY DISORDER): ICD-10-CM

## 2018-03-05 DIAGNOSIS — E11.9 TYPE 2 DIABETES MELLITUS WITHOUT COMPLICATION, WITHOUT LONG-TERM CURRENT USE OF INSULIN (HCC): Primary | ICD-10-CM

## 2018-03-05 PROCEDURE — G8484 FLU IMMUNIZE NO ADMIN: HCPCS | Performed by: FAMILY MEDICINE

## 2018-03-05 PROCEDURE — 99214 OFFICE O/P EST MOD 30 MIN: CPT | Performed by: FAMILY MEDICINE

## 2018-03-05 PROCEDURE — 1090F PRES/ABSN URINE INCON ASSESS: CPT | Performed by: FAMILY MEDICINE

## 2018-03-05 PROCEDURE — G8427 DOCREV CUR MEDS BY ELIG CLIN: HCPCS | Performed by: FAMILY MEDICINE

## 2018-03-05 PROCEDURE — 1123F ACP DISCUSS/DSCN MKR DOCD: CPT | Performed by: FAMILY MEDICINE

## 2018-03-05 PROCEDURE — 4040F PNEUMOC VAC/ADMIN/RCVD: CPT | Performed by: FAMILY MEDICINE

## 2018-03-05 PROCEDURE — 1036F TOBACCO NON-USER: CPT | Performed by: FAMILY MEDICINE

## 2018-03-05 PROCEDURE — G8400 PT W/DXA NO RESULTS DOC: HCPCS | Performed by: FAMILY MEDICINE

## 2018-03-05 PROCEDURE — G8598 ASA/ANTIPLAT THER USED: HCPCS | Performed by: FAMILY MEDICINE

## 2018-03-05 PROCEDURE — G8420 CALC BMI NORM PARAMETERS: HCPCS | Performed by: FAMILY MEDICINE

## 2018-03-05 RX ORDER — ALPRAZOLAM 1 MG/1
1 TABLET ORAL 2 TIMES DAILY PRN
Qty: 60 TABLET | Refills: 2 | Status: SHIPPED | OUTPATIENT
Start: 2018-03-05 | End: 2018-09-05 | Stop reason: SDUPTHER

## 2018-03-12 ENCOUNTER — TELEPHONE (OUTPATIENT)
Dept: CARDIOLOGY CLINIC | Age: 83
End: 2018-03-12

## 2018-03-12 NOTE — TELEPHONE ENCOUNTER
----- Message from Polly Mcneal MA sent at 2/27/2018  1:35 PM EST -----  Per Dr Get Owusu notify of PFT

## 2018-03-15 ENCOUNTER — CARE COORDINATION (OUTPATIENT)
Dept: CARE COORDINATION | Age: 83
End: 2018-03-15

## 2018-03-15 ASSESSMENT — PULMONARY FUNCTION TESTS
FEV1_IN_LITERS: 1
FVC_IN_LITERS: 1.8
FEV1/FVC: 55.6

## 2018-03-15 ASSESSMENT — LIFESTYLE VARIABLES: SMOKING_HISTORY: CIGARETTES

## 2018-03-19 NOTE — CARE COORDINATION
able to live independently?:  Yes     Current Housing:  Private Residence        Per the Fall Risk Screening, did the patient have 2 or more falls or 1 fall with injury in the past year?:  No     Frequent urination at night?:  No  Do you use rails/bars?:  No  Do you have a non-slip tub mat?:  Yes     Are you experiencing loss of meaning?:  No  Are you experiencing loss of hope and peace?:  No     Thinking about your patient's physical health needs, are there any symptoms or problems (risk indicators) you are unsure about that require further investigation?:  No identified areas of uncertainly or problems already being investigated   Are the patients physical health problems impacting on their mental well-being?:  No identified areas of concern   Are there any problems with your patients lifestyle behaviors (alcohol, drugs, diet, exercise) that are impacting on physical or mental well-being?:  No identified areas of concern   Do you have any other concerns about your patients mental well-being? How would you rate their severity and impact on the patient?:  No identified areas of concern   How would you rate their home environment in terms of safety and stability (including domestic violence, insecure housing, neighbor harassment)?:  Consistently safe, supportive, stable, no identified problems   How do daily activities impact on the patient's well-being? (include current or anticipated unemployment, work, caregiving, access to transportation or other):   Contributes to low mood or stress at times   How would you rate their social network (family, work, friends)?:  Adequate participation with social networks   How wells does the patient now understand their health and well-being (symptoms, signs or risk factors) and what they need to do to manage their health?:  Reasonable to good understanding and already engages in managing health or is willing to undertake better management   How well do you think your patient can engage in healthcare discussions? (Barriers include language, deafness, aphasia, alcohol or drug problems, learning difficulties, concentration): Adequate communication, with or without minor barriers   Do other services need to be involved to help this patient?:  Other care/services in place and adequate   Are current services involved with this patient well-coordinated? (Include coordination with other services you are now recommendation): All required care/services in place and well-coordinated   Suggested Interventions and Community Resources  Fall Risk Prevention:  Not Started Pulmonary Rehab:  Not Started   Zone Management Tools: In Process         Schedule an appointment with the patient's PCP, Set up/Review Goals              Prior to Admission medications    Medication Sig Start Date End Date Taking? Authorizing Provider   ALPRAZolam Mattie Cowan) 1 MG tablet Take 1 tablet by mouth 2 times daily as needed for Sleep or Anxiety for up to 30 days DO NOT FILL UNTIL DUE. 3/5/18 4/4/18  Hyacinth Ivan MD   metoprolol tartrate (LOPRESSOR) 25 MG tablet Take by mouth 1/2 TAB BID    Historical Provider, MD   nitroGLYCERIN (NITROSTAT) 0.4 MG SL tablet Place 0.4 mg under the tongue as needed 1 Tablet sublingual every 5 minutes x 3 doses PRN for chest pain.     Historical Provider, MD   metFORMIN (GLUCOPHAGE) 500 MG tablet Take 500 mg by mouth 2 times daily    Historical Provider, MD   aspirin 81 MG chewable tablet Take 81 mg by mouth daily    Historical Provider, MD   fluticasone (FLONASE) 50 MCG/ACT nasal spray 2 sprays each nostril daily 1/3/18   Hyacinth Ivan MD   atorvastatin (LIPITOR) 20 MG tablet Take 1 tablet by mouth daily 7/3/17   Hyacinth Ivan MD   levothyroxine (LEVOTHROID) 75 MCG tablet Take 1 tablet by mouth daily 7/3/17   Hyacinth Ivan MD   [facility] ULTRA TEST strip TEST ONE TO THREE TIMES DAILY AS NEEDED 4/11/17   Hyacinth Ivan MD   cyanocobalamin 1000 MCG/ML injection INJECT 1 ML INTO THE MUSCLE

## 2018-04-03 ENCOUNTER — OFFICE VISIT (OUTPATIENT)
Dept: FAMILY MEDICINE CLINIC | Age: 83
End: 2018-04-03
Payer: MEDICARE

## 2018-04-03 VITALS
TEMPERATURE: 97.7 F | HEART RATE: 76 BPM | RESPIRATION RATE: 16 BRPM | HEIGHT: 66 IN | SYSTOLIC BLOOD PRESSURE: 136 MMHG | BODY MASS INDEX: 22.08 KG/M2 | DIASTOLIC BLOOD PRESSURE: 78 MMHG | WEIGHT: 137.4 LBS

## 2018-04-03 DIAGNOSIS — J44.9 COPD, SEVERE (HCC): Primary | ICD-10-CM

## 2018-04-03 PROCEDURE — G8598 ASA/ANTIPLAT THER USED: HCPCS | Performed by: FAMILY MEDICINE

## 2018-04-03 PROCEDURE — G8400 PT W/DXA NO RESULTS DOC: HCPCS | Performed by: FAMILY MEDICINE

## 2018-04-03 PROCEDURE — 1123F ACP DISCUSS/DSCN MKR DOCD: CPT | Performed by: FAMILY MEDICINE

## 2018-04-03 PROCEDURE — G8427 DOCREV CUR MEDS BY ELIG CLIN: HCPCS | Performed by: FAMILY MEDICINE

## 2018-04-03 PROCEDURE — 1036F TOBACCO NON-USER: CPT | Performed by: FAMILY MEDICINE

## 2018-04-03 PROCEDURE — 4040F PNEUMOC VAC/ADMIN/RCVD: CPT | Performed by: FAMILY MEDICINE

## 2018-04-03 PROCEDURE — G8420 CALC BMI NORM PARAMETERS: HCPCS | Performed by: FAMILY MEDICINE

## 2018-04-03 PROCEDURE — G8926 SPIRO NO PERF OR DOC: HCPCS | Performed by: FAMILY MEDICINE

## 2018-04-03 PROCEDURE — 3023F SPIROM DOC REV: CPT | Performed by: FAMILY MEDICINE

## 2018-04-03 PROCEDURE — 1090F PRES/ABSN URINE INCON ASSESS: CPT | Performed by: FAMILY MEDICINE

## 2018-04-03 PROCEDURE — 99213 OFFICE O/P EST LOW 20 MIN: CPT | Performed by: FAMILY MEDICINE

## 2018-04-03 RX ORDER — ALBUTEROL SULFATE 90 UG/1
2 AEROSOL, METERED RESPIRATORY (INHALATION) EVERY 6 HOURS PRN
Qty: 1 INHALER | Refills: 3 | Status: SHIPPED | OUTPATIENT
Start: 2018-04-03

## 2018-05-10 ENCOUNTER — CARE COORDINATION (OUTPATIENT)
Dept: CARE COORDINATION | Age: 83
End: 2018-05-10

## 2018-05-10 DIAGNOSIS — J44.9 CHRONIC OBSTRUCTIVE PULMONARY DISEASE, UNSPECIFIED COPD TYPE (HCC): ICD-10-CM

## 2018-05-10 ASSESSMENT — PATIENT HEALTH QUESTIONNAIRE - PHQ9: SUM OF ALL RESPONSES TO PHQ QUESTIONS 1-9: 0

## 2018-05-29 ENCOUNTER — OFFICE VISIT (OUTPATIENT)
Dept: CARDIOLOGY CLINIC | Age: 83
End: 2018-05-29
Payer: MEDICARE

## 2018-05-29 VITALS
WEIGHT: 139.4 LBS | OXYGEN SATURATION: 96 % | SYSTOLIC BLOOD PRESSURE: 130 MMHG | HEART RATE: 69 BPM | HEIGHT: 66 IN | BODY MASS INDEX: 22.4 KG/M2 | DIASTOLIC BLOOD PRESSURE: 80 MMHG

## 2018-05-29 DIAGNOSIS — I35.1 MILD AORTIC REGURGITATION: ICD-10-CM

## 2018-05-29 DIAGNOSIS — I10 ESSENTIAL HYPERTENSION: ICD-10-CM

## 2018-05-29 DIAGNOSIS — E78.5 HYPERLIPIDEMIA, UNSPECIFIED HYPERLIPIDEMIA TYPE: Primary | ICD-10-CM

## 2018-05-29 DIAGNOSIS — J44.9 CHRONIC OBSTRUCTIVE PULMONARY DISEASE, UNSPECIFIED COPD TYPE (HCC): ICD-10-CM

## 2018-05-29 DIAGNOSIS — E11.9 TYPE 2 DIABETES MELLITUS WITHOUT COMPLICATION, WITHOUT LONG-TERM CURRENT USE OF INSULIN (HCC): ICD-10-CM

## 2018-05-29 DIAGNOSIS — E78.5 HYPERLIPIDEMIA, UNSPECIFIED HYPERLIPIDEMIA TYPE: ICD-10-CM

## 2018-05-29 DIAGNOSIS — E03.9 ACQUIRED HYPOTHYROIDISM: Chronic | ICD-10-CM

## 2018-05-29 DIAGNOSIS — R06.02 SOB (SHORTNESS OF BREATH): ICD-10-CM

## 2018-05-29 DIAGNOSIS — I20.8 ANGINA AT REST (HCC): ICD-10-CM

## 2018-05-29 LAB
ALBUMIN SERPL-MCNC: 4.6 G/DL (ref 3.9–4.9)
ALP BLD-CCNC: 87 U/L (ref 40–130)
ALT SERPL-CCNC: 11 U/L (ref 0–33)
ANION GAP SERPL CALCULATED.3IONS-SCNC: 15 MEQ/L (ref 7–13)
AST SERPL-CCNC: 14 U/L (ref 0–35)
BILIRUB SERPL-MCNC: 0.6 MG/DL (ref 0–1.2)
BUN BLDV-MCNC: 9 MG/DL (ref 8–23)
CALCIUM SERPL-MCNC: 9.7 MG/DL (ref 8.6–10.2)
CHLORIDE BLD-SCNC: 96 MEQ/L (ref 98–107)
CHOLESTEROL, TOTAL: 148 MG/DL (ref 0–199)
CO2: 26 MEQ/L (ref 22–29)
CREAT SERPL-MCNC: 0.5 MG/DL (ref 0.5–0.9)
GFR AFRICAN AMERICAN: >60
GFR NON-AFRICAN AMERICAN: >60
GLOBULIN: 2.3 G/DL (ref 2.3–3.5)
GLUCOSE BLD-MCNC: 98 MG/DL (ref 74–109)
HBA1C MFR BLD: 6.4 % (ref 4.8–5.9)
HDLC SERPL-MCNC: 57 MG/DL (ref 40–59)
LDL CHOLESTEROL CALCULATED: 73 MG/DL (ref 0–129)
POTASSIUM SERPL-SCNC: 5.2 MEQ/L (ref 3.5–5.1)
SODIUM BLD-SCNC: 137 MEQ/L (ref 132–144)
T4 FREE: 2.19 NG/DL (ref 0.93–1.7)
TOTAL PROTEIN: 6.9 G/DL (ref 6.4–8.1)
TRIGL SERPL-MCNC: 90 MG/DL (ref 0–200)
TSH SERPL DL<=0.05 MIU/L-ACNC: 0.49 UIU/ML (ref 0.27–4.2)

## 2018-05-29 PROCEDURE — 3023F SPIROM DOC REV: CPT | Performed by: INTERNAL MEDICINE

## 2018-05-29 PROCEDURE — G8420 CALC BMI NORM PARAMETERS: HCPCS | Performed by: INTERNAL MEDICINE

## 2018-05-29 PROCEDURE — G8926 SPIRO NO PERF OR DOC: HCPCS | Performed by: INTERNAL MEDICINE

## 2018-05-29 PROCEDURE — 99213 OFFICE O/P EST LOW 20 MIN: CPT | Performed by: INTERNAL MEDICINE

## 2018-05-29 PROCEDURE — G8427 DOCREV CUR MEDS BY ELIG CLIN: HCPCS | Performed by: INTERNAL MEDICINE

## 2018-05-29 PROCEDURE — 1090F PRES/ABSN URINE INCON ASSESS: CPT | Performed by: INTERNAL MEDICINE

## 2018-05-29 PROCEDURE — 1036F TOBACCO NON-USER: CPT | Performed by: INTERNAL MEDICINE

## 2018-05-29 PROCEDURE — G8400 PT W/DXA NO RESULTS DOC: HCPCS | Performed by: INTERNAL MEDICINE

## 2018-05-29 PROCEDURE — G8598 ASA/ANTIPLAT THER USED: HCPCS | Performed by: INTERNAL MEDICINE

## 2018-05-29 PROCEDURE — 4040F PNEUMOC VAC/ADMIN/RCVD: CPT | Performed by: INTERNAL MEDICINE

## 2018-05-29 PROCEDURE — 1123F ACP DISCUSS/DSCN MKR DOCD: CPT | Performed by: INTERNAL MEDICINE

## 2018-06-05 ENCOUNTER — OFFICE VISIT (OUTPATIENT)
Dept: FAMILY MEDICINE CLINIC | Age: 83
End: 2018-06-05
Payer: MEDICARE

## 2018-06-05 VITALS
HEIGHT: 66 IN | SYSTOLIC BLOOD PRESSURE: 124 MMHG | WEIGHT: 138 LBS | TEMPERATURE: 98.2 F | DIASTOLIC BLOOD PRESSURE: 70 MMHG | RESPIRATION RATE: 14 BRPM | HEART RATE: 64 BPM | BODY MASS INDEX: 22.18 KG/M2

## 2018-06-05 DIAGNOSIS — F41.1 GAD (GENERALIZED ANXIETY DISORDER): ICD-10-CM

## 2018-06-05 DIAGNOSIS — J44.9 CHRONIC OBSTRUCTIVE PULMONARY DISEASE, UNSPECIFIED COPD TYPE (HCC): ICD-10-CM

## 2018-06-05 DIAGNOSIS — E53.8 VITAMIN B 12 DEFICIENCY: ICD-10-CM

## 2018-06-05 DIAGNOSIS — I10 ESSENTIAL HYPERTENSION: ICD-10-CM

## 2018-06-05 DIAGNOSIS — E11.9 TYPE 2 DIABETES MELLITUS WITHOUT COMPLICATION, WITHOUT LONG-TERM CURRENT USE OF INSULIN (HCC): Primary | ICD-10-CM

## 2018-06-05 DIAGNOSIS — E78.2 MIXED HYPERLIPIDEMIA: ICD-10-CM

## 2018-06-05 DIAGNOSIS — E03.9 ACQUIRED HYPOTHYROIDISM: Chronic | ICD-10-CM

## 2018-06-05 PROCEDURE — G8598 ASA/ANTIPLAT THER USED: HCPCS | Performed by: FAMILY MEDICINE

## 2018-06-05 PROCEDURE — 4040F PNEUMOC VAC/ADMIN/RCVD: CPT | Performed by: FAMILY MEDICINE

## 2018-06-05 PROCEDURE — 1123F ACP DISCUSS/DSCN MKR DOCD: CPT | Performed by: FAMILY MEDICINE

## 2018-06-05 PROCEDURE — 3023F SPIROM DOC REV: CPT | Performed by: FAMILY MEDICINE

## 2018-06-05 PROCEDURE — 1036F TOBACCO NON-USER: CPT | Performed by: FAMILY MEDICINE

## 2018-06-05 PROCEDURE — G8400 PT W/DXA NO RESULTS DOC: HCPCS | Performed by: FAMILY MEDICINE

## 2018-06-05 PROCEDURE — G8926 SPIRO NO PERF OR DOC: HCPCS | Performed by: FAMILY MEDICINE

## 2018-06-05 PROCEDURE — G8420 CALC BMI NORM PARAMETERS: HCPCS | Performed by: FAMILY MEDICINE

## 2018-06-05 PROCEDURE — 99214 OFFICE O/P EST MOD 30 MIN: CPT | Performed by: FAMILY MEDICINE

## 2018-06-05 PROCEDURE — 1090F PRES/ABSN URINE INCON ASSESS: CPT | Performed by: FAMILY MEDICINE

## 2018-06-05 PROCEDURE — G8427 DOCREV CUR MEDS BY ELIG CLIN: HCPCS | Performed by: FAMILY MEDICINE

## 2018-06-05 RX ORDER — ATORVASTATIN CALCIUM 20 MG/1
20 TABLET, FILM COATED ORAL DAILY
Qty: 90 TABLET | Refills: 2 | Status: SHIPPED | OUTPATIENT
Start: 2018-06-05

## 2018-06-05 RX ORDER — LEVOTHYROXINE SODIUM 0.07 MG/1
75 TABLET ORAL DAILY
Qty: 90 TABLET | Refills: 3 | Status: SHIPPED | OUTPATIENT
Start: 2018-06-05

## 2018-07-24 ENCOUNTER — CARE COORDINATION (OUTPATIENT)
Dept: CARE COORDINATION | Age: 83
End: 2018-07-24

## 2018-07-24 NOTE — CARE COORDINATION
Ambulatory Care Coordination Note  7/24/2018  CM Risk Score: 3  Francis Mortality Risk Score: 12.17    ACC: Shayy Mars, RN    Summary Note: ACC SPOKE TO DAUGHTER TODAY. STATES, MOTHER DOING OK. THE HOT WEATHER IS HARD ON HER BREATHING. SHE REPORTS, COMPLIANT WITH MEDICATIONS, AND BS MONITORING. DENIES, CP. SOB AT BASELINE. DENIES INCREASED EDEMA AND NO FALLS. PATIENT LAST OBS 2/14/2018 @ [address] PATIENT HAS LAB AND PCP FU SCHEDULED . Quoc Crowe received counseling on the following healthy behaviors: SELF MANAGEMENT of chronic health conditions,with goal to improve quality of life and overall wellbeing. The patient is asked to make an attempt to improve diet and exercise patterns to aid in medical management. Patient given educational materials on COPD, BREATHING TECH. USING FAN , STILL IN COOL AREA WHEN HOT. I have instructed Dian to complete a self tracking handout on Blood Sugars  and instructed them to bring it with them to her next appointment. Discussed use, benefit, and side effects of prescribed medications. Barriers to medication compliance addressed. All patient questions answered. Pt voiced understanding. Diabetes Assessment    Medic Alert ID:  No  Meal Planning:  Avoidance of concentrated sweets   How often do you test your blood sugar?:  Daily   Do you have barriers with adherence to non-pharmacologic self-management interventions?  (Nutrition/Exercise/Self-Monitoring):  No   Have you ever had to go to the ED for symptoms of low blood sugar?:  No       No patient-reported symptoms      \  Lab Results   Component Value Date    LABA1C 6.4 (H) 05/29/2018    LABA1C 6.2 (H) 02/15/2018    LABA1C 6.1 (H) 10/27/2017     Lab Results   Component Value Date    LABMICR <1.20 02/27/2017    LDLCALC 73 05/29/2018    CREATININE 0.50 05/29/2018        COPD Assessment    Does the patient understand envrionmental exposure?:  Yes  Is the patient able to verbalize Rescue vs. Long Acting medications?:  Yes  Does the patient have a nebulizer?:  No  Does the patient use a space with inhaled medications?:  No     No patient-reported symptoms         Symptoms:      Have you had a recent diagnosis of pneumonia either by PCP or at a hospital?:  No         Care Coordination Interventions    Program Enrollment:  Rising Risk  Referral from Primary Care Provider:  No  Suggested Interventions and Community Resources  Fall Risk Prevention:  Completed  Pulmonary Rehab:  Declined  Zone Management Tools:  Completed         Goals Addressed             Most Recent     Conditions and Symptoms   Improving (7/24/2018)             I will schedule office visits, as directed by my provider. I will keep my appointment or reschedule if I have to cancel. I will notify my provider of any barriers to my plan of care. I will follow my Zone Management tool to seek urgent or emergent care. I will notify my provider of any symptoms that indicate a worsening of my condition. Barriers: none  Plan for overcoming my barriers:   Confidence: 8/10  Anticipated Goal Completion Date: 11/1/2018              Prior to Admission medications    Medication Sig Start Date End Date Taking?  Authorizing Provider   mometasone-formoterol [facility]) 200-5 MCG/ACT inhaler Inhale 2 puffs into the lungs every 12 hours 6/5/18   Cristina Rai MD   atorvastatin (LIPITOR) 20 MG tablet Take 1 tablet by mouth daily 6/5/18   Cristina Rai MD   levothyroxine (LEVOTHROID) 75 MCG tablet Take 1 tablet by mouth daily 6/5/18   Cristina Rai MD   metoprolol tartrate (LOPRESSOR) 25 MG tablet 1/2 TAB BID 5/29/18   Yo PENDLETON Holiday, DO   ONE TOUCH ULTRA TEST strip TEST ONE TO THREE TIMES DAILY AS NEEDED 5/19/18   Cristina Rai MD   albuterol sulfate HFA (PROAIR HFA) 108 (90 Base) MCG/ACT inhaler Inhale 2 puffs into the lungs every 6 hours as needed for Wheezing 4/3/18   Cristina Rai MD   nitroGLYCERIN (NITROSTAT) 0.4 MG SL tablet Place 0.4 mg under the tongue as needed 1 Tablet

## 2018-10-03 DIAGNOSIS — E03.9 ACQUIRED HYPOTHYROIDISM: Chronic | ICD-10-CM

## 2018-10-03 DIAGNOSIS — E78.2 MIXED HYPERLIPIDEMIA: ICD-10-CM

## 2018-10-03 DIAGNOSIS — E11.9 TYPE 2 DIABETES MELLITUS WITHOUT COMPLICATION, WITHOUT LONG-TERM CURRENT USE OF INSULIN (HCC): ICD-10-CM

## 2018-10-03 DIAGNOSIS — I10 ESSENTIAL HYPERTENSION: ICD-10-CM

## 2018-10-03 LAB
ALBUMIN SERPL-MCNC: 4.4 G/DL (ref 3.9–4.9)
ALP BLD-CCNC: 88 U/L (ref 40–130)
ALT SERPL-CCNC: 11 U/L (ref 0–33)
ANION GAP SERPL CALCULATED.3IONS-SCNC: 14 MEQ/L (ref 7–13)
AST SERPL-CCNC: 16 U/L (ref 0–35)
BASOPHILS ABSOLUTE: 0.1 K/UL (ref 0–0.2)
BASOPHILS RELATIVE PERCENT: 1.2 %
BILIRUB SERPL-MCNC: 0.4 MG/DL (ref 0–1.2)
BUN BLDV-MCNC: 10 MG/DL (ref 8–23)
CALCIUM SERPL-MCNC: 8.3 MG/DL (ref 8.6–10.2)
CHLORIDE BLD-SCNC: 93 MEQ/L (ref 98–107)
CHOLESTEROL, TOTAL: 116 MG/DL (ref 0–199)
CO2: 25 MEQ/L (ref 22–29)
CREAT SERPL-MCNC: 0.62 MG/DL (ref 0.5–0.9)
EOSINOPHILS ABSOLUTE: 0.1 K/UL (ref 0–0.7)
EOSINOPHILS RELATIVE PERCENT: 1.6 %
GFR AFRICAN AMERICAN: >60
GFR NON-AFRICAN AMERICAN: >60
GLOBULIN: 2.4 G/DL (ref 2.3–3.5)
GLUCOSE BLD-MCNC: 98 MG/DL (ref 74–109)
HBA1C MFR BLD: 5.8 % (ref 4.8–5.9)
HCT VFR BLD CALC: 34.3 % (ref 37–47)
HDLC SERPL-MCNC: 49 MG/DL (ref 40–59)
HEMOGLOBIN: 11.8 G/DL (ref 12–16)
LDL CHOLESTEROL CALCULATED: 53 MG/DL (ref 0–129)
LYMPHOCYTES ABSOLUTE: 1.2 K/UL (ref 1–4.8)
LYMPHOCYTES RELATIVE PERCENT: 26.3 %
MCH RBC QN AUTO: 30.4 PG (ref 27–31.3)
MCHC RBC AUTO-ENTMCNC: 34.4 % (ref 33–37)
MCV RBC AUTO: 88.3 FL (ref 82–100)
MONOCYTES ABSOLUTE: 0.4 K/UL (ref 0.2–0.8)
MONOCYTES RELATIVE PERCENT: 8.4 %
NEUTROPHILS ABSOLUTE: 2.8 K/UL (ref 1.4–6.5)
NEUTROPHILS RELATIVE PERCENT: 62.5 %
PDW BLD-RTO: 12.9 % (ref 11.5–14.5)
PLATELET # BLD: 271 K/UL (ref 130–400)
POTASSIUM SERPL-SCNC: 4.6 MEQ/L (ref 3.5–5.1)
RBC # BLD: 3.88 M/UL (ref 4.2–5.4)
SODIUM BLD-SCNC: 132 MEQ/L (ref 132–144)
TOTAL PROTEIN: 6.8 G/DL (ref 6.4–8.1)
TRIGL SERPL-MCNC: 69 MG/DL (ref 0–200)
TSH SERPL DL<=0.05 MIU/L-ACNC: 1.12 UIU/ML (ref 0.27–4.2)
WBC # BLD: 4.5 K/UL (ref 4.8–10.8)

## 2018-10-10 ENCOUNTER — OFFICE VISIT (OUTPATIENT)
Dept: FAMILY MEDICINE CLINIC | Age: 83
End: 2018-10-10
Payer: MEDICARE

## 2018-10-10 ENCOUNTER — CARE COORDINATION (OUTPATIENT)
Dept: CARE COORDINATION | Age: 83
End: 2018-10-10

## 2018-10-10 VITALS
BODY MASS INDEX: 21.92 KG/M2 | WEIGHT: 136.4 LBS | HEART RATE: 72 BPM | SYSTOLIC BLOOD PRESSURE: 130 MMHG | HEIGHT: 66 IN | RESPIRATION RATE: 16 BRPM | DIASTOLIC BLOOD PRESSURE: 62 MMHG | TEMPERATURE: 97.9 F

## 2018-10-10 DIAGNOSIS — E11.9 TYPE 2 DIABETES MELLITUS WITHOUT COMPLICATION, WITHOUT LONG-TERM CURRENT USE OF INSULIN (HCC): Primary | ICD-10-CM

## 2018-10-10 DIAGNOSIS — E03.9 ACQUIRED HYPOTHYROIDISM: Chronic | ICD-10-CM

## 2018-10-10 DIAGNOSIS — E78.2 MIXED HYPERLIPIDEMIA: ICD-10-CM

## 2018-10-10 DIAGNOSIS — Z23 NEED FOR INFLUENZA VACCINATION: ICD-10-CM

## 2018-10-10 DIAGNOSIS — E53.8 VITAMIN B 12 DEFICIENCY: ICD-10-CM

## 2018-10-10 DIAGNOSIS — I10 ESSENTIAL HYPERTENSION: ICD-10-CM

## 2018-10-10 PROCEDURE — G8400 PT W/DXA NO RESULTS DOC: HCPCS | Performed by: FAMILY MEDICINE

## 2018-10-10 PROCEDURE — G0008 ADMIN INFLUENZA VIRUS VAC: HCPCS | Performed by: FAMILY MEDICINE

## 2018-10-10 PROCEDURE — G8420 CALC BMI NORM PARAMETERS: HCPCS | Performed by: FAMILY MEDICINE

## 2018-10-10 PROCEDURE — 1036F TOBACCO NON-USER: CPT | Performed by: FAMILY MEDICINE

## 2018-10-10 PROCEDURE — G8427 DOCREV CUR MEDS BY ELIG CLIN: HCPCS | Performed by: FAMILY MEDICINE

## 2018-10-10 PROCEDURE — 4040F PNEUMOC VAC/ADMIN/RCVD: CPT | Performed by: FAMILY MEDICINE

## 2018-10-10 PROCEDURE — 1090F PRES/ABSN URINE INCON ASSESS: CPT | Performed by: FAMILY MEDICINE

## 2018-10-10 PROCEDURE — 90662 IIV NO PRSV INCREASED AG IM: CPT | Performed by: FAMILY MEDICINE

## 2018-10-10 PROCEDURE — 99214 OFFICE O/P EST MOD 30 MIN: CPT | Performed by: FAMILY MEDICINE

## 2018-10-10 PROCEDURE — 1101F PT FALLS ASSESS-DOCD LE1/YR: CPT | Performed by: FAMILY MEDICINE

## 2018-10-10 PROCEDURE — 1123F ACP DISCUSS/DSCN MKR DOCD: CPT | Performed by: FAMILY MEDICINE

## 2018-10-10 PROCEDURE — G8598 ASA/ANTIPLAT THER USED: HCPCS | Performed by: FAMILY MEDICINE

## 2018-10-10 PROCEDURE — G8482 FLU IMMUNIZE ORDER/ADMIN: HCPCS | Performed by: FAMILY MEDICINE

## 2018-10-10 RX ORDER — CYANOCOBALAMIN 1000 UG/ML
INJECTION INTRAMUSCULAR; SUBCUTANEOUS
Qty: 10 ML | Refills: 2 | Status: SHIPPED | OUTPATIENT
Start: 2018-10-10

## 2018-10-10 RX ORDER — BIMATOPROST 0.3 MG/ML
SOLUTION/ DROPS OPHTHALMIC
Refills: 5 | COMMUNITY
Start: 2018-09-13

## 2018-10-10 ASSESSMENT — PATIENT HEALTH QUESTIONNAIRE - PHQ9
SUM OF ALL RESPONSES TO PHQ QUESTIONS 1-9: 1
1. LITTLE INTEREST OR PLEASURE IN DOING THINGS: 0
2. FEELING DOWN, DEPRESSED OR HOPELESS: 1
SUM OF ALL RESPONSES TO PHQ QUESTIONS 1-9: 1
SUM OF ALL RESPONSES TO PHQ9 QUESTIONS 1 & 2: 1

## 2018-10-10 NOTE — CARE COORDINATION
Ambulatory Care Coordination Note  10/10/2018  CM Risk Score: 3  Francis Mortality Risk Score: 12.17    ACC: Serene Choe, RN    Summary Note: pATIENT WAS SEEN IN OFFICE TODAY FOLLOWING pcp APPOINTMENT. pATIENT STATES SHE IS DOING WELL OVERALL. Patient denied falls, chest pain, increased shortness of breath, or edema. Patient reports, she is compliant with her medications, and all prescriptions are filled. Patient reports a decrease in appetite, but making a conscious effort to increase oral intake. ACC discussed with patient and family and a care coordination questions or needs, supplied information, on obtaining medical alert system. ACC encouraged medical alert system as a tool to promote safety with independent living. Patient and family verbalized understanding. Oliva Mccurdy received counseling on the following healthy behaviors: SELF MANAGEMENT of chronic health conditions,with goal to improve quality of life and overall wellbeing. The patient is asked to make an attempt to improve diet and exercise patterns to aid in medical management. Patient given educational materials on Diabetes and COPD, SELF MANAGEMENT , S/S OF CHANGE IN DM, COPD THAT NEED REPORTED TO PROVIDER. INFORMATION ON VALUE OF MEDICAL ALERT AND RESOURCES TO REVIEW VARIOUS PRODUCTS. I have instructed Dian to complete a self tracking handout on Blood Sugars , Blood Pressures  and Weights and instructed them to bring it with them to her next appointment. Discussed use, benefit, and side effects of prescribed medications. Barriers to medication compliance addressed. All patient questions answered. Pt voiced understanding. Diabetes Assessment    Medic Alert ID:  No  Meal Planning:  Avoidance of concentrated sweets   How often do you test your blood sugar?:  Daily   Do you have barriers with adherence to non-pharmacologic self-management interventions?  (Nutrition/Exercise/Self-Monitoring):  No   Have you ever had to go to the ED for

## 2018-10-10 NOTE — PROGRESS NOTES
(FLONASE) 50 MCG/ACT nasal spray 2 sprays each nostril daily 1 Bottle 1    Needles & Syringes MISC 1 each by Does not apply route daily. 12 each 0    timolol (TIMOPTIC) 0.5 % ophthalmic solution Place 1 drop into both eyes daily. No current facility-administered medications for this visit. Allergies   Allergen Reactions    Sulfa Antibiotics Nausea Only       Review of Systems  Constitutional: Positive for fatigue and poor appetite. No fever or chills. She had lost some weight but her weight is now stable. Eyes: negative for color blindness, irritation, redness and visual disturbance  Ears, nose, mouth, throat, and face: negative for ear drainage, hearing loss, nasal congestion, sore mouth, tinnitus and voice change  Respiratory: negative for cough, dyspnea on exertion, shortness of breath and wheezing  Cardiovascular: negative for chest pain, dyspnea, lower extremity edema, orthopnea, palpitations, paroxysmal nocturnal dyspnea and tachypnea  Gastrointestinal: negative for abdominal pain, constipation, diarrhea, dyspepsia, dysphagia, nausea, odynophagia, reflux symptoms and vomiting  Genitourinary:negative for decreased stream, dysuria, frequency, hematuria, hesitancy and urinary incontinence  Integument/breast: negative for dryness, pruritus, rash and skin color change  Hematologic/lymphatic: negative for bleeding, easy bruising and petechiae  Musculoskeletal:negative for arthralgias, back pain, muscle weakness, myalgias, neck pain and stiff joints  Neurological: negative for coordination problems, dizziness, headaches, paresthesia, speech problems, tremors and vertigo       Objective:      /62   Pulse 72   Temp 97.9 °F (36.6 °C) (Temporal)   Resp 16   Ht 5' 6\" (1.676 m)   Wt 136 lb 6.4 oz (61.9 kg)   BMI 22.02 kg/m²       Well appearing, well nourished patient not in apparent distress. HEENT:   EOMI, PERRLA. No conjunctival pallor or scleral jaundice.    Oropharynx reveals no valid for stable  renal function.  Calcium 10/03/2018 8.3* 8.6 - 10.2 mg/dL Final    Total Protein 10/03/2018 6.8  6.4 - 8.1 g/dL Final    Alb 10/03/2018 4.4  3.9 - 4.9 g/dL Final    Total Bilirubin 10/03/2018 0.4  0.0 - 1.2 mg/dL Final    Alkaline Phosphatase 10/03/2018 88  40 - 130 U/L Final    ALT 10/03/2018 11  0 - 33 U/L Final    AST 10/03/2018 16  0 - 35 U/L Final    Globulin 10/03/2018 2.4  2.3 - 3.5 g/dL Final    Cholesterol, Total 10/03/2018 116  0 - 199 mg/dL Final    ATP III Cholesterol classification is Desirable.  Triglycerides 10/03/2018 69  0 - 200 mg/dL Final    ATP III Triglycerides Classification is Normal.    HDL 10/03/2018 49  40 - 59 mg/dL Final    Comment: ATP III HDL Cholesterol Classification is Desirable. Expected Values:    Males:    >55 = No Risk            35-55 = Moderate Risk            <35 = High Risk    Females:  >65 = No Risk            45-65 = Moderate Risk            <45 = High Risk    NCEP Guidelines:   Third Report May 2001  >59 = negative risk factor for CHD  <40 = major risk factor for CHD      LDL Calculated 10/03/2018 53  0 - 129 mg/dL Final    ATP III LDL Classification is Optimal.    WBC 10/03/2018 4.5* 4.8 - 10.8 K/uL Final    RBC 10/03/2018 3.88* 4.20 - 5.40 M/uL Final    Hemoglobin 10/03/2018 11.8* 12.0 - 16.0 g/dL Final    Hematocrit 10/03/2018 34.3* 37.0 - 47.0 % Final    MCV 10/03/2018 88.3  82.0 - 100.0 fL Final    MCH 10/03/2018 30.4  27.0 - 31.3 pg Final    MCHC 10/03/2018 34.4  33.0 - 37.0 % Final    RDW 10/03/2018 12.9  11.5 - 14.5 % Final    Platelets 29/35/3135 271  130 - 400 K/uL Final    Neutrophils % 10/03/2018 62.5  % Final    Lymphocytes % 10/03/2018 26.3  % Final    Monocytes % 10/03/2018 8.4  % Final    Eosinophils % 10/03/2018 1.6  % Final    Basophils % 10/03/2018 1.2  % Final    Neutrophils # 10/03/2018 2.8  1.4 - 6.5 K/uL Final    Lymphocytes # 10/03/2018 1.2  1.0 - 4.8 K/uL Final    Monocytes # 10/03/2018 0.4  0.2

## 2018-11-06 RX ORDER — LANCETS 33 GAUGE
1 EACH MISCELLANEOUS 3 TIMES DAILY
Qty: 300 EACH | Refills: 3 | Status: SHIPPED | OUTPATIENT
Start: 2018-11-06

## 2018-11-27 ENCOUNTER — OFFICE VISIT (OUTPATIENT)
Dept: CARDIOLOGY CLINIC | Age: 83
End: 2018-11-27
Payer: MEDICARE

## 2018-11-27 VITALS
RESPIRATION RATE: 16 BRPM | HEART RATE: 66 BPM | DIASTOLIC BLOOD PRESSURE: 82 MMHG | WEIGHT: 140.6 LBS | SYSTOLIC BLOOD PRESSURE: 166 MMHG | BODY MASS INDEX: 22.69 KG/M2 | OXYGEN SATURATION: 99 %

## 2018-11-27 DIAGNOSIS — I35.1 MILD AORTIC REGURGITATION: ICD-10-CM

## 2018-11-27 DIAGNOSIS — I10 ESSENTIAL HYPERTENSION: Primary | ICD-10-CM

## 2018-11-27 DIAGNOSIS — E78.2 MIXED HYPERLIPIDEMIA: ICD-10-CM

## 2018-11-27 DIAGNOSIS — R06.02 SOB (SHORTNESS OF BREATH): ICD-10-CM

## 2018-11-27 DIAGNOSIS — I20.8 ANGINA AT REST (HCC): ICD-10-CM

## 2018-11-27 PROBLEM — I20.89 ANGINA AT REST: Status: RESOLVED | Noted: 2018-02-19 | Resolved: 2018-11-27

## 2018-11-27 PROCEDURE — G8420 CALC BMI NORM PARAMETERS: HCPCS | Performed by: INTERNAL MEDICINE

## 2018-11-27 PROCEDURE — 99213 OFFICE O/P EST LOW 20 MIN: CPT | Performed by: INTERNAL MEDICINE

## 2018-11-27 PROCEDURE — 1090F PRES/ABSN URINE INCON ASSESS: CPT | Performed by: INTERNAL MEDICINE

## 2018-11-27 PROCEDURE — 4040F PNEUMOC VAC/ADMIN/RCVD: CPT | Performed by: INTERNAL MEDICINE

## 2018-11-27 PROCEDURE — 1036F TOBACCO NON-USER: CPT | Performed by: INTERNAL MEDICINE

## 2018-11-27 PROCEDURE — G8427 DOCREV CUR MEDS BY ELIG CLIN: HCPCS | Performed by: INTERNAL MEDICINE

## 2018-11-27 PROCEDURE — 1101F PT FALLS ASSESS-DOCD LE1/YR: CPT | Performed by: INTERNAL MEDICINE

## 2018-11-27 PROCEDURE — 1123F ACP DISCUSS/DSCN MKR DOCD: CPT | Performed by: INTERNAL MEDICINE

## 2018-11-27 PROCEDURE — 93000 ELECTROCARDIOGRAM COMPLETE: CPT | Performed by: INTERNAL MEDICINE

## 2018-11-27 PROCEDURE — G8482 FLU IMMUNIZE ORDER/ADMIN: HCPCS | Performed by: INTERNAL MEDICINE

## 2018-11-27 PROCEDURE — G8598 ASA/ANTIPLAT THER USED: HCPCS | Performed by: INTERNAL MEDICINE

## 2018-11-27 PROCEDURE — G8400 PT W/DXA NO RESULTS DOC: HCPCS | Performed by: INTERNAL MEDICINE

## 2018-11-27 NOTE — PROGRESS NOTES
Chief Complaint   Patient presents with    6 Month Follow-Up     [address]    Hypertension       2-27-18: Patient presents for initial medical evaluation. Patient is followed on a regular basis by Dr. Sarai Garcia MD. Was recently hospitalized at [facility] for CP. C.E were negative. CXR was negative. ECHO with normal LVF, mild LVH. Pain radiated to left arm. S/p normal nuclear stress test with EF of 75%. She is having a hard time breathing, states she can't catch her breath. She does get tired easily as well. She smoked for a long time and quit 25 yrs ago or so. She was a /. She denies any re occurrence of her CP. Pt denies chest pain, nausea, vomiting, diarrhea, constipation, motor weakness, insomnia, weight loss, syncope, dizziness, lightheadedness, palpitations, PND, orthopnea. EKG with RBBB. 5-29-18: s/p PFT showing severe COPD with good response to bronchodilators. She will see Dr. Danish Montgomery next week. Does have SOB at baseline and worse with exertion. Better with breathing tx. Pt denies chest pain,   nausea, vomiting, diarrhea, constipation, motor weakness, insomnia, weight loss, syncope, dizziness, lightheadedness, palpitations, PND, orthopnea. BP and hr are good. CAD is stable. No LE discoloration or ulcers. No LE edema. No CHF type symptoms. Lipid profile is normal. No recent hospitalization. No change in meds. 11-27-18: doing ok overall. Has baseline SOB due to COPD. No smoking, she was a , . Pt denies chest pain,  fatigue,  nausea, vomiting, diarrhea, constipation, motor weakness, insomnia, weight loss, syncope, dizziness, lightheadedness, palpitations, PND, orthopnea, or claudication. No nitro use. BP is fernando today a bit. States it good at home and low at times. CAD is stable. No LE discoloration or ulcers. No LE edema. No CHF type symptoms. Lipid profile is normal. No recent hospitalization. No change in meds. Has severe COPD on PFT. EKG with NSR, RBBB. Patient Active Problem List   Diagnosis    Type 2 diabetes mellitus without complication, without long-term current use of insulin [facility]    Mixed hyperlipidemia    Essential hypertension    Glaucoma    Vitamin B 12 deficiency    Mild aortic regurgitation    Acquired hypothyroidism    SOB (shortness of breath)    JOSE (generalized anxiety disorder)    COPD (chronic obstructive pulmonary disease) [facility]       Past Surgical History:   Procedure Laterality Date    THYROIDECTOMY Right 1/9/15    DR. MUSTAFA       Social History     Social History    Marital status:       Spouse name: N/A    Number of children: N/A    Years of education: N/A     Social History Main Topics    Smoking status: Never Smoker    Smokeless tobacco: Never Used    Alcohol use No    Drug use: No    Sexual activity: Not Asked     Other Topics Concern    None     Social History Narrative    None       Family History   Problem Relation Age of Onset    Diabetes Mother     Heart Disease Father        Current Outpatient Prescriptions   Medication Sig Dispense Refill    ONETOUCH DELICA LANCETS 29B MISC 1 box by Does not apply route three times daily 300 each 3    bimatoprost (LUMIGAN) 0.03 % ophthalmic drops INSTILL 1 GTT IN OU QHS  5    cyanocobalamin 1000 MCG/ML injection INJECT 1 ML INTO THE MUSCLE EVERY 14 DAYS 10 mL 2    mometasone-formoterol (DULERA) 200-5 MCG/ACT inhaler Inhale 2 puffs into the lungs every 12 hours 3 Inhaler 2    atorvastatin (LIPITOR) 20 MG tablet Take 1 tablet by mouth daily 90 tablet 2    levothyroxine (LEVOTHROID) 75 MCG tablet Take 1 tablet by mouth daily 90 tablet 3    metoprolol tartrate (LOPRESSOR) 25 MG tablet 1/2 TAB BID 30 tablet 5    ONE TOUCH ULTRA TEST strip TEST ONE TO THREE TIMES DAILY AS NEEDED 300 strip 3    albuterol sulfate HFA (PROAIR HFA) 108 (90 Base) MCG/ACT inhaler Inhale 2 puffs into the lungs every 6 hours as needed for Wheezing 1 Inhaler 3    nitroGLYCERIN

## 2018-12-17 ENCOUNTER — CARE COORDINATION (OUTPATIENT)
Dept: CARE COORDINATION | Age: 83
End: 2018-12-17

## 2018-12-17 NOTE — CARE COORDINATION
Patient fell in home 12/13/2018 slipped on rug in bathroom and hit her bottom on toilet. Daughter states, her mother states, a sore tailbone area and has to sit on one/side of hip. ACC ENCOURAGED PATIENT TO SEEK MEDICAL EVALUATION.
Completed  Pulmonary Rehab:  Declined  Zone Management Tools:  Completed         Goals Addressed             Most Recent     Conditions and Symptoms   No change (12/17/2018)             I will schedule office visits, as directed by my provider. I will keep my appointment or reschedule if I have to cancel. I will notify my provider of any barriers to my plan of care. I will follow my Zone Management tool to seek urgent or emergent care. I will notify my provider of any symptoms that indicate a worsening of my condition. Barriers: none  Plan for overcoming my barriers:   Confidence: 8/10  Anticipated Goal Completion Date: 2/16/2019         Reduce Falls                 I will reduce my risk of falls by the following: Remove rugs or use non slip rugs  Install grab bars in bathroom  Use walking aids like cane or walker    Barriers: none  Plan for overcoming my barriers: N/A  Confidence: 5/10  Anticipated Goal Completion Date: 2/16/2019            Prior to Admission medications    Medication Sig Start Date End Date Taking?  Authorizing Provider   metoprolol tartrate (LOPRESSOR) 25 MG tablet 1/2 TAB BID 11/27/18   Yo Bush,    ONETOUCH TERRENCEICA LANCETS 11Y MISC 1 box by Does not apply route three times daily 11/6/18   Glenna Solorzano APRN - CNP   bimatoprost (LUMIGAN) 0.03 % ophthalmic drops INSTILL 1 GTT IN OU QHS 9/13/18   Historical Provider, MD   cyanocobalamin 1000 MCG/ML injection INJECT 1 ML INTO THE MUSCLE EVERY 14 DAYS 10/10/18   Ted Thompson MD   mometasone-formoterol [facility]) 200-5 MCG/ACT inhaler Inhale 2 puffs into the lungs every 12 hours 6/5/18   Ted Thompson MD   atorvastatin (LIPITOR) 20 MG tablet Take 1 tablet by mouth daily 6/5/18   Ted Thompson MD   levothyroxine (LEVOTHROID) 75 MCG tablet Take 1 tablet by mouth daily 6/5/18   Ted Thompson MD   [facility] ULTRA TEST strip TEST ONE TO THREE TIMES DAILY AS NEEDED 5/19/18   Ted Thompson MD   albuterol sulfate HFA (PROAIR HFA) 108 (90

## 2018-12-19 DIAGNOSIS — E53.8 VITAMIN B 12 DEFICIENCY: Primary | ICD-10-CM

## 2018-12-19 RX ORDER — IBUPROFEN 200 MG
TABLET ORAL
Qty: 10 EACH | Refills: 3 | Status: SHIPPED | OUTPATIENT
Start: 2018-12-19

## 2018-12-19 RX ORDER — METFORMIN HYDROCHLORIDE 500 MG/1
1000 TABLET, EXTENDED RELEASE ORAL
Qty: 180 TABLET | Refills: 1 | Status: SHIPPED | OUTPATIENT
Start: 2018-12-19

## 2018-12-19 NOTE — TELEPHONE ENCOUNTER
The patient is requesting a refill for metformin. She is requesting a smaller needle to inject hr B12. Cell [phone] (home)     The patient use [facility].    Please approve or deny. Thank you!

## 2019-02-06 DIAGNOSIS — E78.2 MIXED HYPERLIPIDEMIA: ICD-10-CM

## 2019-02-06 DIAGNOSIS — I10 ESSENTIAL HYPERTENSION: ICD-10-CM

## 2019-02-06 DIAGNOSIS — E11.9 TYPE 2 DIABETES MELLITUS WITHOUT COMPLICATION, WITHOUT LONG-TERM CURRENT USE OF INSULIN (HCC): ICD-10-CM

## 2019-02-06 DIAGNOSIS — E03.9 ACQUIRED HYPOTHYROIDISM: Chronic | ICD-10-CM

## 2019-02-06 LAB
ALBUMIN SERPL-MCNC: 4.4 G/DL (ref 3.9–4.9)
ALP BLD-CCNC: 88 U/L (ref 40–130)
ALT SERPL-CCNC: 10 U/L (ref 0–33)
ANION GAP SERPL CALCULATED.3IONS-SCNC: 15 MEQ/L (ref 7–13)
AST SERPL-CCNC: 14 U/L (ref 0–35)
BILIRUB SERPL-MCNC: 0.4 MG/DL (ref 0–1.2)
BUN BLDV-MCNC: 9 MG/DL (ref 8–23)
CALCIUM SERPL-MCNC: 9.4 MG/DL (ref 8.6–10.2)
CHLORIDE BLD-SCNC: 94 MEQ/L (ref 98–107)
CHOLESTEROL, TOTAL: 115 MG/DL (ref 0–199)
CO2: 25 MEQ/L (ref 22–29)
CREAT SERPL-MCNC: 0.54 MG/DL (ref 0.5–0.9)
GFR AFRICAN AMERICAN: >60
GFR NON-AFRICAN AMERICAN: >60
GLOBULIN: 2.6 G/DL (ref 2.3–3.5)
GLUCOSE BLD-MCNC: 103 MG/DL (ref 74–109)
HBA1C MFR BLD: 6.2 % (ref 4.8–5.9)
HDLC SERPL-MCNC: 48 MG/DL (ref 40–59)
LDL CHOLESTEROL CALCULATED: 50 MG/DL (ref 0–129)
POTASSIUM SERPL-SCNC: 4.9 MEQ/L (ref 3.5–5.1)
SODIUM BLD-SCNC: 134 MEQ/L (ref 132–144)
TOTAL PROTEIN: 7 G/DL (ref 6.4–8.1)
TRIGL SERPL-MCNC: 86 MG/DL (ref 0–200)
TSH SERPL DL<=0.05 MIU/L-ACNC: 1.11 UIU/ML (ref 0.27–4.2)

## 2019-02-13 ENCOUNTER — OFFICE VISIT (OUTPATIENT)
Dept: FAMILY MEDICINE CLINIC | Age: 84
End: 2019-02-13
Payer: MEDICARE

## 2019-02-13 VITALS
TEMPERATURE: 96.5 F | SYSTOLIC BLOOD PRESSURE: 130 MMHG | WEIGHT: 142 LBS | HEIGHT: 66 IN | HEART RATE: 84 BPM | RESPIRATION RATE: 12 BRPM | BODY MASS INDEX: 22.82 KG/M2 | DIASTOLIC BLOOD PRESSURE: 78 MMHG

## 2019-02-13 DIAGNOSIS — J44.9 CHRONIC OBSTRUCTIVE PULMONARY DISEASE, UNSPECIFIED COPD TYPE (HCC): ICD-10-CM

## 2019-02-13 DIAGNOSIS — E03.9 ACQUIRED HYPOTHYROIDISM: Chronic | ICD-10-CM

## 2019-02-13 DIAGNOSIS — F41.1 GAD (GENERALIZED ANXIETY DISORDER): ICD-10-CM

## 2019-02-13 DIAGNOSIS — I10 ESSENTIAL HYPERTENSION: ICD-10-CM

## 2019-02-13 DIAGNOSIS — Z91.81 AT HIGH RISK FOR FALLS: ICD-10-CM

## 2019-02-13 DIAGNOSIS — R13.19 ESOPHAGEAL DYSPHAGIA: ICD-10-CM

## 2019-02-13 DIAGNOSIS — E78.2 MIXED HYPERLIPIDEMIA: ICD-10-CM

## 2019-02-13 DIAGNOSIS — E11.9 TYPE 2 DIABETES MELLITUS WITHOUT COMPLICATION, WITHOUT LONG-TERM CURRENT USE OF INSULIN (HCC): Primary | ICD-10-CM

## 2019-02-13 PROCEDURE — 3023F SPIROM DOC REV: CPT | Performed by: FAMILY MEDICINE

## 2019-02-13 PROCEDURE — 1123F ACP DISCUSS/DSCN MKR DOCD: CPT | Performed by: FAMILY MEDICINE

## 2019-02-13 PROCEDURE — 1101F PT FALLS ASSESS-DOCD LE1/YR: CPT | Performed by: FAMILY MEDICINE

## 2019-02-13 PROCEDURE — 99214 OFFICE O/P EST MOD 30 MIN: CPT | Performed by: FAMILY MEDICINE

## 2019-02-13 PROCEDURE — 4040F PNEUMOC VAC/ADMIN/RCVD: CPT | Performed by: FAMILY MEDICINE

## 2019-02-13 PROCEDURE — G8427 DOCREV CUR MEDS BY ELIG CLIN: HCPCS | Performed by: FAMILY MEDICINE

## 2019-02-13 PROCEDURE — 1036F TOBACCO NON-USER: CPT | Performed by: FAMILY MEDICINE

## 2019-02-13 PROCEDURE — G8926 SPIRO NO PERF OR DOC: HCPCS | Performed by: FAMILY MEDICINE

## 2019-02-13 PROCEDURE — G8420 CALC BMI NORM PARAMETERS: HCPCS | Performed by: FAMILY MEDICINE

## 2019-02-13 PROCEDURE — 1090F PRES/ABSN URINE INCON ASSESS: CPT | Performed by: FAMILY MEDICINE

## 2019-02-13 PROCEDURE — G8400 PT W/DXA NO RESULTS DOC: HCPCS | Performed by: FAMILY MEDICINE

## 2019-02-13 PROCEDURE — G8482 FLU IMMUNIZE ORDER/ADMIN: HCPCS | Performed by: FAMILY MEDICINE

## 2019-02-13 ASSESSMENT — PATIENT HEALTH QUESTIONNAIRE - PHQ9
SUM OF ALL RESPONSES TO PHQ QUESTIONS 1-9: 0
1. LITTLE INTEREST OR PLEASURE IN DOING THINGS: 0
2. FEELING DOWN, DEPRESSED OR HOPELESS: 0
SUM OF ALL RESPONSES TO PHQ9 QUESTIONS 1 & 2: 0
SUM OF ALL RESPONSES TO PHQ QUESTIONS 1-9: 0

## 2019-02-19 ENCOUNTER — CARE COORDINATION (OUTPATIENT)
Dept: CARE COORDINATION | Age: 84
End: 2019-02-19

## 2019-03-01 LAB — GLUCOSE BLD-MCNC: 124 MG/DL (ref 70–100)

## 2019-03-06 ENCOUNTER — TELEPHONE (OUTPATIENT)
Dept: CARE COORDINATION | Age: 84
End: 2019-03-06

## 2019-03-19 ENCOUNTER — CARE COORDINATION (OUTPATIENT)
Dept: CARE COORDINATION | Age: 84
End: 2019-03-19

## 2019-08-13 ENCOUNTER — OFFICE VISIT (OUTPATIENT)
Dept: CARDIOLOGY CLINIC | Age: 84
End: 2019-08-13
Payer: MEDICARE

## 2019-08-13 VITALS
WEIGHT: 148.4 LBS | HEART RATE: 62 BPM | SYSTOLIC BLOOD PRESSURE: 124 MMHG | HEIGHT: 66 IN | DIASTOLIC BLOOD PRESSURE: 82 MMHG | OXYGEN SATURATION: 98 % | BODY MASS INDEX: 23.85 KG/M2 | RESPIRATION RATE: 18 BRPM

## 2019-08-13 DIAGNOSIS — I10 ESSENTIAL HYPERTENSION: Primary | ICD-10-CM

## 2019-08-13 DIAGNOSIS — E78.2 MIXED HYPERLIPIDEMIA: ICD-10-CM

## 2019-08-13 DIAGNOSIS — I35.1 MILD AORTIC REGURGITATION: ICD-10-CM

## 2019-08-13 PROCEDURE — G8420 CALC BMI NORM PARAMETERS: HCPCS | Performed by: INTERNAL MEDICINE

## 2019-08-13 PROCEDURE — 1036F TOBACCO NON-USER: CPT | Performed by: INTERNAL MEDICINE

## 2019-08-13 PROCEDURE — 1123F ACP DISCUSS/DSCN MKR DOCD: CPT | Performed by: INTERNAL MEDICINE

## 2019-08-13 PROCEDURE — 99213 OFFICE O/P EST LOW 20 MIN: CPT | Performed by: INTERNAL MEDICINE

## 2019-08-13 PROCEDURE — G8400 PT W/DXA NO RESULTS DOC: HCPCS | Performed by: INTERNAL MEDICINE

## 2019-08-13 PROCEDURE — 93000 ELECTROCARDIOGRAM COMPLETE: CPT | Performed by: INTERNAL MEDICINE

## 2019-08-13 PROCEDURE — G8427 DOCREV CUR MEDS BY ELIG CLIN: HCPCS | Performed by: INTERNAL MEDICINE

## 2019-08-13 PROCEDURE — 1090F PRES/ABSN URINE INCON ASSESS: CPT | Performed by: INTERNAL MEDICINE

## 2019-08-13 PROCEDURE — 4040F PNEUMOC VAC/ADMIN/RCVD: CPT | Performed by: INTERNAL MEDICINE

## 2019-08-13 NOTE — PROGRESS NOTES
8-13-19: doing ok. Has COPD and chronic SOB. Pt denies chest pain, d,  nausea, vomiting, diarrhea, constipation, motor weakness, insomnia, weight loss, syncope, dizziness, lightheadedness, palpitations, PND, orthopnea, or claudication. No nitro use. BP and hr are good. CAD is stable. No LE discoloration or ulcers. No LE edema. No CHF type symptoms. Lipid profile is normal. No recent hospitalization. No change in meds. Has hx of esophageal stricture s/p dilation. EKG with NSR, RBBB. Patient Active Problem List   Diagnosis    Type 2 diabetes mellitus without complication, without long-term current use of insulin ([facility]    Mixed hyperlipidemia    Essential hypertension    Glaucoma    Vitamin B 12 deficiency    Mild aortic regurgitation    Acquired hypothyroidism    SOB (shortness of breath)    JOSE (generalized anxiety disorder)    COPD (chronic obstructive pulmonary disease) ([facility]       Past Surgical History:   Procedure Laterality Date    THYROIDECTOMY Right 1/9/15    DR. MUSTAFA    UPPER GASTROINTESTINAL ENDOSCOPY  03/01/2019    DR. CABALLERO       Social History     Socioeconomic History    Marital status:       Spouse name: None    Number of children: None    Years of education: None    Highest education level: None   Occupational History    None   Social Needs    Financial resource strain: None    Food insecurity:     Worry: None     Inability: None    Transportation needs:     Medical: None     Non-medical: None   Tobacco Use    Smoking status: Never Smoker    Smokeless tobacco: Never Used   Substance and Sexual Activity    Alcohol use: No    Drug use: No    Sexual activity: None   Lifestyle    Physical activity:     Days per week: None     Minutes per session: None    Stress: None   Relationships    Social connections:     Talks on phone: None     Gets together: None     Attends [employer] service: None     Active member of club or organization: None     Attends meetings of

## 2019-09-17 DIAGNOSIS — I20.8 ANGINA AT REST (HCC): ICD-10-CM

## 2020-02-11 ENCOUNTER — OFFICE VISIT (OUTPATIENT)
Dept: CARDIOLOGY CLINIC | Age: 85
End: 2020-02-11
Payer: MEDICARE

## 2020-02-11 VITALS
WEIGHT: 134.6 LBS | BODY MASS INDEX: 21.63 KG/M2 | SYSTOLIC BLOOD PRESSURE: 122 MMHG | HEIGHT: 66 IN | DIASTOLIC BLOOD PRESSURE: 78 MMHG | RESPIRATION RATE: 18 BRPM | HEART RATE: 64 BPM | OXYGEN SATURATION: 99 %

## 2020-02-11 PROCEDURE — 1036F TOBACCO NON-USER: CPT | Performed by: INTERNAL MEDICINE

## 2020-02-11 PROCEDURE — 1090F PRES/ABSN URINE INCON ASSESS: CPT | Performed by: INTERNAL MEDICINE

## 2020-02-11 PROCEDURE — G8400 PT W/DXA NO RESULTS DOC: HCPCS | Performed by: INTERNAL MEDICINE

## 2020-02-11 PROCEDURE — 1123F ACP DISCUSS/DSCN MKR DOCD: CPT | Performed by: INTERNAL MEDICINE

## 2020-02-11 PROCEDURE — 99214 OFFICE O/P EST MOD 30 MIN: CPT | Performed by: INTERNAL MEDICINE

## 2020-02-11 PROCEDURE — G8427 DOCREV CUR MEDS BY ELIG CLIN: HCPCS | Performed by: INTERNAL MEDICINE

## 2020-02-11 PROCEDURE — G8420 CALC BMI NORM PARAMETERS: HCPCS | Performed by: INTERNAL MEDICINE

## 2020-02-11 PROCEDURE — G8484 FLU IMMUNIZE NO ADMIN: HCPCS | Performed by: INTERNAL MEDICINE

## 2020-02-11 PROCEDURE — 4040F PNEUMOC VAC/ADMIN/RCVD: CPT | Performed by: INTERNAL MEDICINE

## 2020-02-11 NOTE — PROGRESS NOTES
None     Inability: None    Transportation needs:     Medical: None     Non-medical: None   Tobacco Use    Smoking status: Never Smoker    Smokeless tobacco: Never Used   Substance and Sexual Activity    Alcohol use: No    Drug use: No    Sexual activity: None   Lifestyle    Physical activity:     Days per week: None     Minutes per session: None    Stress: None   Relationships    Social connections:     Talks on phone: None     Gets together: None     Attends Congregation service: None     Active member of club or organization: None     Attends meetings of clubs or organizations: None     Relationship status: None    Intimate partner violence:     Fear of current or ex partner: None     Emotionally abused: None     Physically abused: None     Forced sexual activity: None   Other Topics Concern    None   Social History Narrative    None       Family History   Problem Relation Age of Onset    Diabetes Mother     Heart Disease Father        Current Outpatient Medications   Medication Sig Dispense Refill    metoprolol tartrate (LOPRESSOR) 25 MG tablet TAKE 1/2 TABLET BY MOUTH TWICE DAILY 90 tablet 3    INSULIN SYRINGE 1CC/29G (B-D INS SYR ULTRAFINE 1CC/29G) 29G X 1/2\" 1 ML MISC Inject every 14 days 10 each 3    metFORMIN (GLUCOPHAGE XR) 500 MG extended release tablet Take 2 tablets by mouth daily (with breakfast) 180 tablet 1    ONETOUCH DELICA LANCETS 68I MISC 1 box by Does not apply route three times daily 300 each 3    bimatoprost (LUMIGAN) 0.03 % ophthalmic drops INSTILL 1 GTT IN OU QHS  5    cyanocobalamin 1000 MCG/ML injection INJECT 1 ML INTO THE MUSCLE EVERY 14 DAYS 10 mL 2    atorvastatin (LIPITOR) 20 MG tablet Take 1 tablet by mouth daily 90 tablet 2    levothyroxine (LEVOTHROID) 75 MCG tablet Take 1 tablet by mouth daily 90 tablet 3    ONE TOUCH ULTRA TEST strip TEST ONE TO THREE TIMES DAILY AS NEEDED 300 strip 3    albuterol sulfate HFA (PROAIR HFA) 108 (90 Base) MCG/ACT inhaler Inhale 2 puffs into the lungs every 6 hours as needed for Wheezing 1 Inhaler 3    nitroGLYCERIN (NITROSTAT) 0.4 MG SL tablet Place 0.4 mg under the tongue as needed 1 Tablet sublingual every 5 minutes x 3 doses PRN for chest pain.  aspirin 81 MG chewable tablet Take 81 mg by mouth daily      fluticasone (FLONASE) 50 MCG/ACT nasal spray 2 sprays each nostril daily 1 Bottle 1    timolol (TIMOPTIC) 0.5 % ophthalmic solution Place 1 drop into both eyes daily. No current facility-administered medications for this visit. Bee venom and Sulfa antibiotics    Review of Systems:  General ROS: positive for  - fatigue  Psychological ROS: negative  Hematological and Lymphatic ROS: No history of blood clots or bleeding disorder. Respiratory ROS: positive for - shortness of breath  Cardiovascular ROS: positive for - chest pain, dyspnea on exertion and shortness of breath  Gastrointestinal ROS: no abdominal pain, change in bowel habits, or black or bloody stools  Genito-Urinary ROS: no dysuria, trouble voiding, or hematuria  Musculoskeletal ROS: negative  Neurological ROS: no TIA or stroke symptoms  Dermatological ROS: negative    VITALS:  Blood pressure 122/78, pulse 64, resp. rate 18, height 5' 6\" (1.676 m), weight 134 lb 9.6 oz (61.1 kg), SpO2 99 %. Body mass index is 21.73 kg/m². Physical Examination:  General appearance - alert, well appearing, and in no distress and normal appearing weight  Mental status - alert, oriented to person, place, and time  Neck - Neck is supple, no JVD or carotid bruits. No thyromegaly or adenopathy.    Chest - clear to auscultation, no wheezes, rales or rhonchi, symmetric air entry  Heart - normal rate, regular rhythm, normal S1, S2, no murmurs, rubs, clicks or gallops  Abdomen - soft, nontender, nondistended, no masses or organomegaly  Neurological - alert, oriented, normal speech, no focal findings or movement disorder noted  Extremities - peripheral pulses normal, no pedal

## 2020-02-17 ENCOUNTER — HOSPITAL ENCOUNTER (OUTPATIENT)
Dept: ULTRASOUND IMAGING | Age: 85
Discharge: HOME | End: 2020-02-19
Payer: MEDICARE

## 2020-02-17 PROCEDURE — 93880 EXTRACRANIAL BILAT STUDY: CPT

## 2020-03-02 ENCOUNTER — TELEPHONE (OUTPATIENT)
Dept: CARDIOLOGY CLINIC | Age: 85
End: 2020-03-02

## 2020-08-04 ENCOUNTER — OFFICE VISIT (OUTPATIENT)
Dept: CARDIOLOGY CLINIC | Age: 85
End: 2020-08-04
Payer: MEDICARE

## 2020-08-04 VITALS
SYSTOLIC BLOOD PRESSURE: 110 MMHG | DIASTOLIC BLOOD PRESSURE: 60 MMHG | HEART RATE: 53 BPM | WEIGHT: 139 LBS | BODY MASS INDEX: 22.44 KG/M2

## 2020-08-04 PROBLEM — Z87.891 HISTORY OF TOBACCO ABUSE: Status: ACTIVE | Noted: 2020-08-04

## 2020-08-04 PROCEDURE — 93000 ELECTROCARDIOGRAM COMPLETE: CPT | Performed by: INTERNAL MEDICINE

## 2020-08-04 PROCEDURE — 1090F PRES/ABSN URINE INCON ASSESS: CPT | Performed by: INTERNAL MEDICINE

## 2020-08-04 PROCEDURE — G8427 DOCREV CUR MEDS BY ELIG CLIN: HCPCS | Performed by: INTERNAL MEDICINE

## 2020-08-04 PROCEDURE — G8420 CALC BMI NORM PARAMETERS: HCPCS | Performed by: INTERNAL MEDICINE

## 2020-08-04 PROCEDURE — 99214 OFFICE O/P EST MOD 30 MIN: CPT | Performed by: INTERNAL MEDICINE

## 2020-08-04 PROCEDURE — 4040F PNEUMOC VAC/ADMIN/RCVD: CPT | Performed by: INTERNAL MEDICINE

## 2020-08-04 PROCEDURE — G8400 PT W/DXA NO RESULTS DOC: HCPCS | Performed by: INTERNAL MEDICINE

## 2020-08-04 PROCEDURE — 1123F ACP DISCUSS/DSCN MKR DOCD: CPT | Performed by: INTERNAL MEDICINE

## 2020-08-04 PROCEDURE — 1036F TOBACCO NON-USER: CPT | Performed by: INTERNAL MEDICINE

## 2020-08-04 NOTE — PROGRESS NOTES
RBBB.     19: doing ok. Has COPD and chronic SOB. Pt denies chest pain, d,  nausea, vomiting, diarrhea, constipation, motor weakness, insomnia, weight loss, syncope, dizziness, lightheadedness, palpitations, PND, orthopnea, or claudication. No nitro use. BP and hr are good. CAD is stable. No LE discoloration or ulcers. No LE edema. No CHF type symptoms. Lipid profile is normal. No recent hospitalization. No change in meds. Has hx of esophageal stricture s/p dilation. EKG with NSR, RBBB. 20: son just  not too long ago. Dose have some hoarseness and coughing. Merla Marinas which helps. Pt denies chest pain,  nausea, vomiting, diarrhea, constipation, motor weakness, insomnia, weight loss, syncope, dizziness, lightheadedness, palpitations, PND, orthopnea, or claudication. Has hx of esophageal stricture s/p dilation. . BP and hr are good. No LE discoloration or ulcers. No LE edema. No CHF type symptoms. Lipid profile is normal. No recent hospitalization. No change in meds. Hx of severe COPD. S/p negative stress test in .     20: EKG with NSR, RBBB. Has hx of severe COPD. Does not wear O2 at home yet. Pt denies chest pain,  nausea, vomiting, diarrhea, constipation, motor weakness, insomnia, weight loss, syncope, dizziness, lightheadedness, palpitations, PND, orthopnea, or claudication. Has hx of esophageal stricture s/p dilation. No smoking. Hx of negative stress test in 2018. No nitro use. BP and hr are good. . No LE discoloration or ulcers. No LE edema. No CHF type symptoms. Lipid profile is normal. No recent hospitalization. No change in meds. S/p CUS on 2020 with less than 50% b/l stenosis.          Patient Active Problem List   Diagnosis    Type 2 diabetes mellitus without complication, without long-term current use of insulin ([address]    Mixed hyperlipidemia    Essential hypertension    Glaucoma    Vitamin B 12 deficiency    Mild aortic regurgitation    Acquired hypothyroidism    SOB (shortness of breath)    JOSE (generalized anxiety disorder)    COPD (chronic obstructive pulmonary disease) (HCC)    History of tobacco abuse       Past Surgical History:   Procedure Laterality Date    THYROIDECTOMY Right 1/9/15    DR. MUSTAFA    UPPER GASTROINTESTINAL ENDOSCOPY  03/01/2019    DR. CABALLERO       Social History     Socioeconomic History    Marital status:       Spouse name: Not on file    Number of children: Not on file    Years of education: Not on file    Highest education level: Not on file   Occupational History    Not on file   Social Needs    Financial resource strain: Not on file    Food insecurity     Worry: Not on file     Inability: Not on file    Transportation needs     Medical: Not on file     Non-medical: Not on file   Tobacco Use    Smoking status: Never Smoker    Smokeless tobacco: Never Used   Substance and Sexual Activity    Alcohol use: No    Drug use: No    Sexual activity: Not on file   Lifestyle    Physical activity     Days per week: Not on file     Minutes per session: Not on file    Stress: Not on file   Relationships    Social connections     Talks on phone: Not on file     Gets together: Not on file     Attends Christianity service: Not on file     Active member of club or organization: Not on file     Attends meetings of clubs or organizations: Not on file     Relationship status: Not on file    Intimate partner violence     Fear of current or ex partner: Not on file     Emotionally abused: Not on file     Physically abused: Not on file     Forced sexual activity: Not on file   Other Topics Concern    Not on file   Social History Narrative    Not on file       Family History   Problem Relation Age of Onset    Diabetes Mother     Heart Disease Father        Current Outpatient Medications   Medication Sig Dispense Refill    metoprolol tartrate (LOPRESSOR) 25 MG tablet TAKE 1/2 TABLET BY MOUTH TWICE DAILY 90 tablet 3    INSULIN negative    VITALS:  Blood pressure 110/60, pulse 53, weight 139 lb (63 kg). Body mass index is 22.44 kg/m². Physical Examination:  General appearance - alert, well appearing, and in no distress and normal appearing weight  Mental status - alert, oriented to person, place, and time  Neck - Neck is supple, no JVD or carotid bruits. No thyromegaly or adenopathy. Chest - clear to auscultation, no wheezes, rales or rhonchi, symmetric air entry  Heart - normal rate, regular rhythm, normal S1, S2, no murmurs, rubs, clicks or gallops  Abdomen - soft, nontender, nondistended, no masses or organomegaly  Neurological - alert, oriented, normal speech, no focal findings or movement disorder noted  Extremities - peripheral pulses normal, no pedal edema, no clubbing or cyanosis  Skin - normal coloration and turgor, no rashes, no suspicious skin lesions noted    Orders Placed This Encounter   Procedures    EKG 12 Lead       ASSESSMENT:     Diagnosis Orders   1. Essential hypertension  EKG 12 Lead   2. Mixed hyperlipidemia     3. Mild aortic regurgitation     4. History of tobacco abuse           PLAN:     Patient will need to continue to follow up with you for their general medical care and return to see me in the office in 6 months    As always, aggressive risk factor modification is strongly recommended. We should adhere to the JNC VIII guidelines for HTN management and the NCEP ATP III guidelines for LDL-C management. Cardiac diet is always recommended with low fat, cholesterol, calories and sodium. Continue medications at current doses. ASA, STATIN, Lopressor. SL nitro as needed. Check EKG    See PCP for COPD. If she develops recurent SOB and CP, then will need C. Will continue to monitor patient clinically, if symptoms develop or worsen, they are to let me know ASAP or head to the nearest emergeny room.     Patient was advised and encouraged to check blood pressure at home or at a pharmacy, maintain a logbook, and also call us back if blood pressure are above the target ranges or if it is low. Patient clearly understands and agrees to the instructions. We will need to continue to monitor muscle and liver enzymes, BUN, CR, and electrolytes. Details of medical condition explained and patient was warned about adverse consequences of uncontrolled medical conditions and possible side effects of prescribed medications.

## 2020-09-21 NOTE — TELEPHONE ENCOUNTER
requesting medication refill.  Please approve or deny this request.    Rx requested:  Requested Prescriptions     Pending Prescriptions Disp Refills    metoprolol tartrate (LOPRESSOR) 25 MG tablet [Pharmacy Med Name: METOPROLOL TARTRATE 25MG TABLETS] 90 tablet 3     Sig: TAKE 1/2 TABLET BY MOUTH TWICE DAILY         Last Office Visit:   8/4/2020      Next Visit Date:  Future Appointments   Date Time Provider Katie Murillo   2/2/2021 11:45 AM Yo Taveras, DO [facility]

## 2021-02-02 ENCOUNTER — OFFICE VISIT (OUTPATIENT)
Dept: CARDIOLOGY CLINIC | Age: 86
End: 2021-02-02
Payer: MEDICARE

## 2021-02-02 VITALS
HEART RATE: 58 BPM | WEIGHT: 137.2 LBS | OXYGEN SATURATION: 99 % | TEMPERATURE: 97.2 F | DIASTOLIC BLOOD PRESSURE: 72 MMHG | RESPIRATION RATE: 16 BRPM | BODY MASS INDEX: 22.14 KG/M2 | SYSTOLIC BLOOD PRESSURE: 164 MMHG

## 2021-02-02 DIAGNOSIS — I10 ESSENTIAL HYPERTENSION: Primary | ICD-10-CM

## 2021-02-02 DIAGNOSIS — Z87.891 HISTORY OF TOBACCO ABUSE: ICD-10-CM

## 2021-02-02 DIAGNOSIS — E78.2 MIXED HYPERLIPIDEMIA: ICD-10-CM

## 2021-02-02 DIAGNOSIS — R06.02 SOB (SHORTNESS OF BREATH): ICD-10-CM

## 2021-02-02 DIAGNOSIS — I35.1 MILD AORTIC REGURGITATION: ICD-10-CM

## 2021-02-02 PROCEDURE — 99213 OFFICE O/P EST LOW 20 MIN: CPT | Performed by: INTERNAL MEDICINE

## 2021-02-02 PROCEDURE — 1123F ACP DISCUSS/DSCN MKR DOCD: CPT | Performed by: INTERNAL MEDICINE

## 2021-02-02 PROCEDURE — 93000 ELECTROCARDIOGRAM COMPLETE: CPT | Performed by: INTERNAL MEDICINE

## 2021-02-02 PROCEDURE — 1036F TOBACCO NON-USER: CPT | Performed by: INTERNAL MEDICINE

## 2021-02-02 PROCEDURE — G8427 DOCREV CUR MEDS BY ELIG CLIN: HCPCS | Performed by: INTERNAL MEDICINE

## 2021-02-02 PROCEDURE — G8420 CALC BMI NORM PARAMETERS: HCPCS | Performed by: INTERNAL MEDICINE

## 2021-02-02 PROCEDURE — 4040F PNEUMOC VAC/ADMIN/RCVD: CPT | Performed by: INTERNAL MEDICINE

## 2021-02-02 PROCEDURE — G8484 FLU IMMUNIZE NO ADMIN: HCPCS | Performed by: INTERNAL MEDICINE

## 2021-02-02 PROCEDURE — 1090F PRES/ABSN URINE INCON ASSESS: CPT | Performed by: INTERNAL MEDICINE

## 2021-02-02 NOTE — PROGRESS NOTES
Chief Complaint   Patient presents with    6 Month Follow-Up     AORTIC REGURGITATION    Hypertension       2-27-18: Patient presents for initial medical evaluation. Patient is followed on a regular basis by Dr. Jaimie Wolfe MD. Was recently hospitalized at [facility] for CP. C.E were negative. CXR was negative. ECHO with normal LVF, mild LVH. Pain radiated to left arm. S/p normal nuclear stress test with EF of 75%. She is having a hard time breathing, states she can't catch her breath. She does get tired easily as well. She smoked for a long time and quit 25 yrs ago or so. She was a /. She denies any re occurrence of her CP. Pt denies chest pain, nausea, vomiting, diarrhea, constipation, motor weakness, insomnia, weight loss, syncope, dizziness, lightheadedness, palpitations, PND, orthopnea. EKG with RBBB. 5-29-18: s/p PFT showing severe COPD with good response to bronchodilators. She will see Dr. Jason Vázquez next week. Does have SOB at baseline and worse with exertion. Better with breathing tx. Pt denies chest pain,   nausea, vomiting, diarrhea, constipation, motor weakness, insomnia, weight loss, syncope, dizziness, lightheadedness, palpitations, PND, orthopnea. BP and hr are good. CAD is stable. No LE discoloration or ulcers. No LE edema. No CHF type symptoms. Lipid profile is normal. No recent hospitalization. No change in meds. 11-27-18: doing ok overall. Has baseline SOB due to COPD. No smoking, she was a , . Pt denies chest pain,  fatigue,  nausea, vomiting, diarrhea, constipation, motor weakness, insomnia, weight loss, syncope, dizziness, lightheadedness, palpitations, PND, orthopnea, or claudication. No nitro use. BP is fernando today a bit. States it good at home and low at times. CAD is stable. No LE discoloration or ulcers. No LE edema. No CHF type symptoms. Lipid profile is normal. No recent hospitalization. No change in meds. Has severe COPD on PFT.  EKG with NSR, RBBB. 19: doing ok. Has COPD and chronic SOB. Pt denies chest pain, d,  nausea, vomiting, diarrhea, constipation, motor weakness, insomnia, weight loss, syncope, dizziness, lightheadedness, palpitations, PND, orthopnea, or claudication. No nitro use. BP and hr are good. CAD is stable. No LE discoloration or ulcers. No LE edema. No CHF type symptoms. Lipid profile is normal. No recent hospitalization. No change in meds. Has hx of esophageal stricture s/p dilation. EKG with NSR, RBBB. 20: son just  not too long ago. Dose have some hoarseness and coughing. Rohan Santosh which helps. Pt denies chest pain,  nausea, vomiting, diarrhea, constipation, motor weakness, insomnia, weight loss, syncope, dizziness, lightheadedness, palpitations, PND, orthopnea, or claudication. Has hx of esophageal stricture s/p dilation. . BP and hr are good. No LE discoloration or ulcers. No LE edema. No CHF type symptoms. Lipid profile is normal. No recent hospitalization. No change in meds. Hx of severe COPD. S/p negative stress test in 2018.     20: EKG with NSR, RBBB. Has hx of severe COPD. Does not wear O2 at home yet. Pt denies chest pain,  nausea, vomiting, diarrhea, constipation, motor weakness, insomnia, weight loss, syncope, dizziness, lightheadedness, palpitations, PND, orthopnea, or claudication. Has hx of esophageal stricture s/p dilation. No smoking. Hx of negative stress test in 2018. No nitro use. BP and hr are good. . No LE discoloration or ulcers. No LE edema. No CHF type symptoms. Lipid profile is normal. No recent hospitalization. No change in meds. S/p CUS on 2020 with less than 50% b/l stenosis. 2021: Patient is having eye issues and follow-up with ophthalmology. Patient with history of severe COPD. Status post normal nuclear stress test in 2018. She denies any chest pain angina or CHF type symptoms. Lipid profile is normal.  No recent hospitalizations.   No change in any of her cardiac medications. She denies any smoking. Patient with history of diabetes, hypertension hyperlipidemia. Patient with history of tobacco abuse quit a long time ago. EKG with sinus bradycardia, right bundle branch block. Heart rate of 58 bpm.    Patient Active Problem List   Diagnosis    Type 2 diabetes mellitus without complication, without long-term current use of insulin (HCC)    Mixed hyperlipidemia    Essential hypertension    Glaucoma    Vitamin B 12 deficiency    Mild aortic regurgitation    Acquired hypothyroidism    SOB (shortness of breath)    JOSE (generalized anxiety disorder)    COPD (chronic obstructive pulmonary disease) [facility]    History of tobacco abuse       Past Surgical History:   Procedure Laterality Date    THYROIDECTOMY Right 1/9/15    DR. MUSTAFA    UPPER GASTROINTESTINAL ENDOSCOPY  03/01/2019    DR. CABALLERO       Social History     Socioeconomic History    Marital status:       Spouse name: None    Number of children: None    Years of education: None    Highest education level: None   Occupational History    None   Social Needs    Financial resource strain: None    Food insecurity     Worry: None     Inability: None    Transportation needs     Medical: None     Non-medical: None   Tobacco Use    Smoking status: Never Smoker    Smokeless tobacco: Never Used   Substance and Sexual Activity    Alcohol use: No    Drug use: No    Sexual activity: None   Lifestyle    Physical activity     Days per week: None     Minutes per session: None    Stress: None   Relationships    Social connections     Talks on phone: None     Gets together: None     Attends Voodoo service: None     Active member of club or organization: None     Attends meetings of clubs or organizations: None     Relationship status: None    Intimate partner violence     Fear of current or ex partner: None     Emotionally abused: None     Physically abused: None     Forced for - shortness of breath  Cardiovascular ROS: positive for - chest pain, dyspnea on exertion and shortness of breath  Gastrointestinal ROS: no abdominal pain, change in bowel habits, or black or bloody stools  Genito-Urinary ROS: no dysuria, trouble voiding, or hematuria  Musculoskeletal ROS: negative  Neurological ROS: no TIA or stroke symptoms  Dermatological ROS: negative    VITALS:  Blood pressure (!) 164/72, pulse 58, temperature 97.2 °F (36.2 °C), temperature source Infrared, resp. rate 16, weight 137 lb 3.2 oz (62.2 kg), SpO2 99 %. Body mass index is 22.14 kg/m². Physical Examination:  General appearance - alert, well appearing, and in no distress and normal appearing weight  Mental status - alert, oriented to person, place, and time  Neck - Neck is supple, no JVD or carotid bruits. No thyromegaly or adenopathy. Chest - clear to auscultation, no wheezes, rales or rhonchi, symmetric air entry  Heart - normal rate, regular rhythm, normal S1, S2, no murmurs, rubs, clicks or gallops  Abdomen - soft, nontender, nondistended, no masses or organomegaly  Neurological - alert, oriented, normal speech, no focal findings or movement disorder noted  Extremities - peripheral pulses normal, no pedal edema, no clubbing or cyanosis  Skin - normal coloration and turgor, no rashes, no suspicious skin lesions noted    Orders Placed This Encounter   Procedures    EKG 12 lead       ASSESSMENT:     Diagnosis Orders   1. Essential hypertension  EKG 12 lead   2. Mild aortic regurgitation     3. SOB (shortness of breath)     4. Mixed hyperlipidemia     5. History of tobacco abuse           PLAN:     Patient will need to continue to follow up with you for their general medical care and return to see me in the office in 6 months    As always, aggressive risk factor modification is strongly recommended.  We should adhere to the JNC VIII guidelines for HTN management and the NCEP ATP III guidelines for LDL-C management. Cardiac diet is always recommended with low fat, cholesterol, calories and sodium. Continue medications at current doses. ASA, STATIN, Lopressor. SL nitro as needed. Check EKG    See PCP for COPD. If she develops recurent SOB and CP, then will need LHC. Will continue to monitor patient clinically, if symptoms develop or worsen, they are to let me know ASAP or head to the nearest emergeny room. Patient was advised and encouraged to check blood pressure at home or at a pharmacy, maintain a logbook, and also call us back if blood pressure are above the target ranges or if it is low. Patient clearly understands and agrees to the instructions. We will need to continue to monitor muscle and liver enzymes, BUN, CR, and electrolytes. Details of medical condition explained and patient was warned about adverse consequences of uncontrolled medical conditions and possible side effects of prescribed medications.

## 2021-09-07 ENCOUNTER — OFFICE VISIT (OUTPATIENT)
Dept: CARDIOLOGY CLINIC | Age: 86
End: 2021-09-07
Payer: MEDICARE

## 2021-09-07 VITALS
RESPIRATION RATE: 16 BRPM | DIASTOLIC BLOOD PRESSURE: 62 MMHG | HEART RATE: 53 BPM | SYSTOLIC BLOOD PRESSURE: 150 MMHG | TEMPERATURE: 97.1 F | OXYGEN SATURATION: 99 % | WEIGHT: 129.6 LBS | BODY MASS INDEX: 20.92 KG/M2

## 2021-09-07 DIAGNOSIS — I35.1 MILD AORTIC REGURGITATION: ICD-10-CM

## 2021-09-07 DIAGNOSIS — R06.02 SOB (SHORTNESS OF BREATH): ICD-10-CM

## 2021-09-07 DIAGNOSIS — I10 ESSENTIAL HYPERTENSION: Primary | ICD-10-CM

## 2021-09-07 DIAGNOSIS — I20.8 ANGINA AT REST (HCC): ICD-10-CM

## 2021-09-07 DIAGNOSIS — Z87.891 HISTORY OF TOBACCO ABUSE: ICD-10-CM

## 2021-09-07 DIAGNOSIS — E78.2 MIXED HYPERLIPIDEMIA: ICD-10-CM

## 2021-09-07 PROCEDURE — 1123F ACP DISCUSS/DSCN MKR DOCD: CPT | Performed by: INTERNAL MEDICINE

## 2021-09-07 PROCEDURE — G8427 DOCREV CUR MEDS BY ELIG CLIN: HCPCS | Performed by: INTERNAL MEDICINE

## 2021-09-07 PROCEDURE — 99214 OFFICE O/P EST MOD 30 MIN: CPT | Performed by: INTERNAL MEDICINE

## 2021-09-07 PROCEDURE — 1036F TOBACCO NON-USER: CPT | Performed by: INTERNAL MEDICINE

## 2021-09-07 PROCEDURE — 4040F PNEUMOC VAC/ADMIN/RCVD: CPT | Performed by: INTERNAL MEDICINE

## 2021-09-07 PROCEDURE — 1090F PRES/ABSN URINE INCON ASSESS: CPT | Performed by: INTERNAL MEDICINE

## 2021-09-07 PROCEDURE — G8420 CALC BMI NORM PARAMETERS: HCPCS | Performed by: INTERNAL MEDICINE

## 2021-09-07 RX ORDER — NITROGLYCERIN 0.4 MG/1
0.4 TABLET SUBLINGUAL PRN
Qty: 25 TABLET | Refills: 2 | Status: SHIPPED | OUTPATIENT
Start: 2021-09-07

## 2021-09-07 RX ORDER — PANTOPRAZOLE SODIUM 40 MG/1
40 TABLET, DELAYED RELEASE ORAL DAILY
COMMUNITY
End: 2021-09-07 | Stop reason: SDUPTHER

## 2021-09-07 RX ORDER — PANTOPRAZOLE SODIUM 40 MG/1
40 TABLET, DELAYED RELEASE ORAL DAILY
Qty: 30 TABLET | Refills: 5 | Status: SHIPPED | OUTPATIENT
Start: 2021-09-07

## 2021-09-07 RX ORDER — NIFEDIPINE 30 MG/1
30 TABLET, FILM COATED, EXTENDED RELEASE ORAL DAILY
COMMUNITY
End: 2022-03-08 | Stop reason: SDUPTHER

## 2021-09-07 NOTE — PROGRESS NOTES
Chief Complaint   Patient presents with    6 Month Follow-Up    Hypertension       2-27-18: Patient presents for initial medical evaluation. Patient is followed on a regular basis by Dr. Jenniffer Rubinstein, MD. Was recently hospitalized at [facility] for CP. C.E were negative. CXR was negative. ECHO with normal LVF, mild LVH. Pain radiated to left arm. S/p normal nuclear stress test with EF of 75%. She is having a hard time breathing, states she can't catch her breath. She does get tired easily as well. She smoked for a long time and quit 25 yrs ago or so. She was a /. She denies any re occurrence of her CP. Pt denies chest pain, nausea, vomiting, diarrhea, constipation, motor weakness, insomnia, weight loss, syncope, dizziness, lightheadedness, palpitations, PND, orthopnea. EKG with RBBB. 5-29-18: s/p PFT showing severe COPD with good response to bronchodilators. She will see Dr. Grupo Vázquez next week. Does have SOB at baseline and worse with exertion. Better with breathing tx. Pt denies chest pain,   nausea, vomiting, diarrhea, constipation, motor weakness, insomnia, weight loss, syncope, dizziness, lightheadedness, palpitations, PND, orthopnea. BP and hr are good. CAD is stable. No LE discoloration or ulcers. No LE edema. No CHF type symptoms. Lipid profile is normal. No recent hospitalization. No change in meds. 11-27-18: doing ok overall. Has baseline SOB due to COPD. No smoking, she was a , . Pt denies chest pain,  fatigue,  nausea, vomiting, diarrhea, constipation, motor weakness, insomnia, weight loss, syncope, dizziness, lightheadedness, palpitations, PND, orthopnea, or claudication. No nitro use. BP is fernando today a bit. States it good at home and low at times. CAD is stable. No LE discoloration or ulcers. No LE edema. No CHF type symptoms. Lipid profile is normal. No recent hospitalization. No change in meds. Has severe COPD on PFT. EKG with NSR, RBBB.      8-13-19: doing ok. Has COPD and chronic SOB. Pt denies chest pain, d,  nausea, vomiting, diarrhea, constipation, motor weakness, insomnia, weight loss, syncope, dizziness, lightheadedness, palpitations, PND, orthopnea, or claudication. No nitro use. BP and hr are good. CAD is stable. No LE discoloration or ulcers. No LE edema. No CHF type symptoms. Lipid profile is normal. No recent hospitalization. No change in meds. Has hx of esophageal stricture s/p dilation. EKG with NSR, RBBB. 20: son just  not too long ago. Dose have some hoarseness and coughing. [place] which helps. Pt denies chest pain,  nausea, vomiting, diarrhea, constipation, motor weakness, insomnia, weight loss, syncope, dizziness, lightheadedness, palpitations, PND, orthopnea, or claudication. Has hx of esophageal stricture s/p dilation. . BP and hr are good. No LE discoloration or ulcers. No LE edema. No CHF type symptoms. Lipid profile is normal. No recent hospitalization. No change in meds. Hx of severe COPD. S/p negative stress test in 2018.     20: EKG with NSR, RBBB. Has hx of severe COPD. Does not wear O2 at home yet. Pt denies chest pain,  nausea, vomiting, diarrhea, constipation, motor weakness, insomnia, weight loss, syncope, dizziness, lightheadedness, palpitations, PND, orthopnea, or claudication. Has hx of esophageal stricture s/p dilation. No smoking. Hx of negative stress test in 2018. No nitro use. BP and hr are good. . No LE discoloration or ulcers. No LE edema. No CHF type symptoms. Lipid profile is normal. No recent hospitalization. No change in meds. S/p CUS on 2020 with less than 50% b/l stenosis. 2021: Patient is having eye issues and follow-up with ophthalmology. Patient with history of severe COPD. Status post normal nuclear stress test in 2018. She denies any chest pain angina or CHF type symptoms. Lipid profile is normal.  No recent hospitalizations.   No change in any of her cardiac medications. She denies any smoking. Patient with history of diabetes, hypertension hyperlipidemia. Patient with history of tobacco abuse quit a long time ago. EKG with sinus bradycardia, right bundle branch block. Heart rate of 58 bpm.    9-7-21: Pt denies chest pain, dyspnea, dyspnea on exertion, change in exercise capacity, fatigue,  nausea, vomiting, diarrhea, constipation, motor weakness, insomnia, weight loss, syncope, dizziness, lightheadedness, palpitations, PND, orthopnea, or claudication. Hx of COPD, DM, HTN and HLD. Status post normal nuclear stress test in 2018. She denies any chest pain angina or CHF type symptoms. Lipid profile is normal.       Patient Active Problem List   Diagnosis    Type 2 diabetes mellitus without complication, without long-term current use of insulin ([facility] [facility]    Mixed hyperlipidemia    Essential hypertension    Glaucoma    Vitamin B 12 deficiency    Mild aortic regurgitation    Acquired hypothyroidism    SOB (shortness of breath)    JOSE (generalized anxiety disorder)    COPD (chronic obstructive pulmonary disease) ([facility] [address] [facility]    History of tobacco abuse       Past Surgical History:   Procedure Laterality Date    THYROIDECTOMY Right 1/9/15    DR. MUSTAFA    UPPER GASTROINTESTINAL ENDOSCOPY  03/01/2019    DR. CABALLERO       Social History     Socioeconomic History    Marital status:       Spouse name: None    Number of children: None    Years of education: None    Highest education level: None   Occupational History    None   Tobacco Use    Smoking status: Never Smoker    Smokeless tobacco: Never Used   Substance and Sexual Activity    Alcohol use: No    Drug use: No    Sexual activity: None   Other Topics Concern    None   Social History Narrative    None     Social Determinants of Health     Financial Resource Strain:     Difficulty of Paying Living Expenses:    Food Insecurity:     Worried About Running Out of Food in the Last Year:     Rony of Food in the Last Year:    Transportation Needs:     Lack of Transportation (Medical):      Lack of Transportation (Non-Medical):    Physical Activity:     Days of Exercise per Week:     Minutes of Exercise per Session:    Stress:     Feeling of Stress :    Social Connections:     Frequency of Communication with Friends and Family:     Frequency of Social Gatherings with Friends and Family:     Attends Hinduism Services:     Active Member of Clubs or Organizations:     Attends Club or Organization Meetings:     Marital Status:    Intimate Partner Violence:     Fear of Current or Ex-Partner:     Emotionally Abused:     Physically Abused:     Sexually Abused:        Family History   Problem Relation Age of Onset    Diabetes Mother     Heart Disease Father        Current Outpatient Medications   Medication Sig Dispense Refill    pantoprazole (PROTONIX) 40 MG tablet Take 40 mg by mouth daily      NIFEdipine (ADALAT CC) 30 MG extended release tablet Take 30 mg by mouth daily      metoprolol tartrate (LOPRESSOR) 25 MG tablet TAKE 1/2 TABLET BY MOUTH TWICE DAILY 90 tablet 3    INSULIN SYRINGE 1CC/29G (B-D INS SYR ULTRAFINE 1CC/29G) 29G X 1/2\" 1 ML MISC Inject every 14 days 10 each 3    metFORMIN (GLUCOPHAGE XR) 500 MG extended release tablet Take 2 tablets by mouth daily (with breakfast) 180 tablet 1    ONETOUCH DELICA LANCETS 33T MISC 1 box by Does not apply route three times daily 300 each 3    bimatoprost (LUMIGAN) 0.03 % ophthalmic drops INSTILL 1 GTT IN OU QHS  5    cyanocobalamin 1000 MCG/ML injection INJECT 1 ML INTO THE MUSCLE EVERY 14 DAYS 10 mL 2    atorvastatin (LIPITOR) 20 MG tablet Take 1 tablet by mouth daily 90 tablet 2    levothyroxine (LEVOTHROID) 75 MCG tablet Take 1 tablet by mouth daily 90 tablet 3    ONE TOUCH ULTRA TEST strip TEST ONE TO THREE TIMES DAILY AS NEEDED 300 strip 3    albuterol sulfate HFA (PROAIR HFA) 108 (90 Base) MCG/ACT inhaler Inhale 2 puffs into the pulses normal, no pedal edema, no clubbing or cyanosis  Skin - normal coloration and turgor, no rashes, no suspicious skin lesions noted    No orders of the defined types were placed in this encounter. ASSESSMENT:     Diagnosis Orders   1. Essential hypertension     2. Angina at rest [facility]) - stable. nitroGLYCERIN (NITROSTAT) 0.4 MG SL tablet   3. SOB (shortness of breath)     4. Mixed hyperlipidemia     5. Mild aortic regurgitation           PLAN:     Patient will need to continue to follow up with you for their general medical care and return to see me in the office in 6 months    As always, aggressive risk factor modification is strongly recommended. We should adhere to the JNC VIII guidelines for HTN management and the NCEP ATP III guidelines for LDL-C management. Cardiac diet is always recommended with low fat, cholesterol, calories and sodium. Continue medications at current doses. ASA, STATIN, Lopressor. SL nitro as needed. Check EKG    See PCP for COPD. If she develops recurent SOB and CP, then will need LHC. Will continue to monitor patient clinically, if symptoms develop or worsen, they are to let me know ASAP or head to the nearest emergeny room. Patient was advised and encouraged to check blood pressure at home or at a pharmacy, maintain a logbook, and also call us back if blood pressure are above the target ranges or if it is low. Patient clearly understands and agrees to the instructions. We will need to continue to monitor muscle and liver enzymes, BUN, CR, and electrolytes. Details of medical condition explained and patient was warned about adverse consequences of uncontrolled medical conditions and possible side effects of prescribed medications.

## 2021-09-09 DIAGNOSIS — I20.8 ANGINA AT REST (HCC): ICD-10-CM

## 2021-09-09 NOTE — TELEPHONE ENCOUNTER
Requesting medication refill. Please approve or deny this request.    Rx requested:  Requested Prescriptions     Pending Prescriptions Disp Refills    metoprolol tartrate (LOPRESSOR) 25 MG tablet [Pharmacy Med Name: METOPROLOL TARTRATE 25MG TABLETS] 90 tablet 3     Sig: TAKE 1/2 TABLET BY MOUTH TWICE DAILY         Last Office Visit:   9/7/2021      Next Visit Date:  Future Appointments   Date Time Provider Katie Murillo   3/8/2022 11:45 AM Yo Wong, DO [facility]               Last refill 9/12/20. Please approve or deny.

## 2022-03-08 ENCOUNTER — OFFICE VISIT (OUTPATIENT)
Dept: CARDIOLOGY CLINIC | Age: 87
End: 2022-03-08
Payer: MEDICARE

## 2022-03-08 VITALS
BODY MASS INDEX: 22.6 KG/M2 | TEMPERATURE: 97.2 F | HEART RATE: 51 BPM | SYSTOLIC BLOOD PRESSURE: 126 MMHG | DIASTOLIC BLOOD PRESSURE: 66 MMHG | WEIGHT: 140 LBS

## 2022-03-08 DIAGNOSIS — I10 ESSENTIAL HYPERTENSION: Primary | ICD-10-CM

## 2022-03-08 PROCEDURE — G8420 CALC BMI NORM PARAMETERS: HCPCS | Performed by: INTERNAL MEDICINE

## 2022-03-08 PROCEDURE — 1123F ACP DISCUSS/DSCN MKR DOCD: CPT | Performed by: INTERNAL MEDICINE

## 2022-03-08 PROCEDURE — 4040F PNEUMOC VAC/ADMIN/RCVD: CPT | Performed by: INTERNAL MEDICINE

## 2022-03-08 PROCEDURE — G8427 DOCREV CUR MEDS BY ELIG CLIN: HCPCS | Performed by: INTERNAL MEDICINE

## 2022-03-08 PROCEDURE — 1090F PRES/ABSN URINE INCON ASSESS: CPT | Performed by: INTERNAL MEDICINE

## 2022-03-08 PROCEDURE — 93000 ELECTROCARDIOGRAM COMPLETE: CPT | Performed by: INTERNAL MEDICINE

## 2022-03-08 PROCEDURE — 1036F TOBACCO NON-USER: CPT | Performed by: INTERNAL MEDICINE

## 2022-03-08 PROCEDURE — G8484 FLU IMMUNIZE NO ADMIN: HCPCS | Performed by: INTERNAL MEDICINE

## 2022-03-08 PROCEDURE — 99213 OFFICE O/P EST LOW 20 MIN: CPT | Performed by: INTERNAL MEDICINE

## 2022-03-08 RX ORDER — NIFEDIPINE 30 MG/1
30 TABLET, EXTENDED RELEASE ORAL DAILY
COMMUNITY
Start: 2022-03-07

## 2022-03-08 NOTE — PROGRESS NOTES
Chief Complaint   Patient presents with    Hypertension    Hyperlipidemia    6 Month Follow-Up       2-27-18: Patient presents for initial medical evaluation. Patient is followed on a regular basis by Dr. Nuria Rowe MD. Was recently hospitalized at [facility] for CP. C.E were negative. CXR was negative. ECHO with normal LVF, mild LVH. Pain radiated to left arm. S/p normal nuclear stress test with EF of 75%. She is having a hard time breathing, states she can't catch her breath. She does get tired easily as well. She smoked for a long time and quit 25 yrs ago or so. She was a /. She denies any re occurrence of her CP. Pt denies chest pain, nausea, vomiting, diarrhea, constipation, motor weakness, insomnia, weight loss, syncope, dizziness, lightheadedness, palpitations, PND, orthopnea. EKG with RBBB. 5-29-18: s/p PFT showing severe COPD with good response to bronchodilators. She will see Dr. Jeff Trotter next week. Does have SOB at baseline and worse with exertion. Better with breathing tx. Pt denies chest pain,   nausea, vomiting, diarrhea, constipation, motor weakness, insomnia, weight loss, syncope, dizziness, lightheadedness, palpitations, PND, orthopnea. BP and hr are good. CAD is stable. No LE discoloration or ulcers. No LE edema. No CHF type symptoms. Lipid profile is normal. No recent hospitalization. No change in meds. 11-27-18: doing ok overall. Has baseline SOB due to COPD. No smoking, she was a , . Pt denies chest pain,  fatigue,  nausea, vomiting, diarrhea, constipation, motor weakness, insomnia, weight loss, syncope, dizziness, lightheadedness, palpitations, PND, orthopnea, or claudication. No nitro use. BP is fernando today a bit. States it good at home and low at times. CAD is stable. No LE discoloration or ulcers. No LE edema. No CHF type symptoms. Lipid profile is normal. No recent hospitalization. No change in meds. Has severe COPD on PFT.  EKG with NSR, RBBB.     19: doing ok. Has COPD and chronic SOB. Pt denies chest pain, d,  nausea, vomiting, diarrhea, constipation, motor weakness, insomnia, weight loss, syncope, dizziness, lightheadedness, palpitations, PND, orthopnea, or claudication. No nitro use. BP and hr are good. CAD is stable. No LE discoloration or ulcers. No LE edema. No CHF type symptoms. Lipid profile is normal. No recent hospitalization. No change in meds. Has hx of esophageal stricture s/p dilation. EKG with NSR, RBBB. 20: son just  not too long ago. Dose have some hoarseness and coughing. [place] which helps. Pt denies chest pain,  nausea, vomiting, diarrhea, constipation, motor weakness, insomnia, weight loss, syncope, dizziness, lightheadedness, palpitations, PND, orthopnea, or claudication. Has hx of esophageal stricture s/p dilation. . BP and hr are good. No LE discoloration or ulcers. No LE edema. No CHF type symptoms. Lipid profile is normal. No recent hospitalization. No change in meds. Hx of severe COPD. S/p negative stress test in 2018.     20: EKG with NSR, RBBB. Has hx of severe COPD. Does not wear O2 at home yet. Pt denies chest pain,  nausea, vomiting, diarrhea, constipation, motor weakness, insomnia, weight loss, syncope, dizziness, lightheadedness, palpitations, PND, orthopnea, or claudication. Has hx of esophageal stricture s/p dilation. No smoking. Hx of negative stress test in 2018. No nitro use. BP and hr are good. . No LE discoloration or ulcers. No LE edema. No CHF type symptoms. Lipid profile is normal. No recent hospitalization. No change in meds. S/p CUS on 2020 with less than 50% b/l stenosis. 2021: Patient is having eye issues and follow-up with ophthalmology. Patient with history of severe COPD. Status post normal nuclear stress test in 2018. She denies any chest pain angina or CHF type symptoms. Lipid profile is normal.  No recent hospitalizations.   No change in any of her cardiac medications. She denies any smoking. Patient with history of diabetes, hypertension hyperlipidemia. Patient with history of tobacco abuse quit a long time ago. EKG with sinus bradycardia, right bundle branch block. Heart rate of 58 bpm.    9-7-21: Pt denies chest pain, dyspnea, dyspnea on exertion, change in exercise capacity, fatigue,  nausea, vomiting, diarrhea, constipation, motor weakness, insomnia, weight loss, syncope, dizziness, lightheadedness, palpitations, PND, orthopnea, or claudication. Hx of COPD, DM, HTN and HLD. Status post normal nuclear stress test in 2018. She denies any chest pain angina or CHF type symptoms. Lipid profile is normal.     3-8-22: has a lump in her neck and having some test by PCP. No angina. Sometimes has some SOB and improves with breathing tx. Hx of COPD, DM, HTN and HLD. Status post normal nuclear stress test in 2018. Pt denies chest pain,nausea, vomiting, diarrhea, constipation, motor weakness, insomnia, weight loss, syncope, dizziness, lightheadedness, palpitations, PND, orthopnea, or claudication. S/p CUS on 2/2020 with less than 50% b/l stenosis. EKG with NSR, RBBB. Chronic. Patient Active Problem List   Diagnosis    Type 2 diabetes mellitus without complication, without long-term current use of insulin ([address]    Mixed hyperlipidemia    Essential hypertension    Glaucoma    Vitamin B 12 deficiency    Mild aortic regurgitation    Acquired hypothyroidism    SOB (shortness of breath)    JOSE (generalized anxiety disorder)    COPD (chronic obstructive pulmonary disease) ([address]    History of tobacco abuse       Past Surgical History:   Procedure Laterality Date    THYROIDECTOMY Right 1/9/15    DR. MUSTAFA    UPPER GASTROINTESTINAL ENDOSCOPY  03/01/2019    DR. CABALLERO       Social History     Socioeconomic History    Marital status:       Spouse name: Not on file    Number of children: Not on file    Years of education: Not on file    Highest education level: Not on file   Occupational History    Not on file   Tobacco Use    Smoking status: Never Smoker    Smokeless tobacco: Never Used   Substance and Sexual Activity    Alcohol use: No    Drug use: No    Sexual activity: Not on file   Other Topics Concern    Not on file   Social History Narrative    Not on file     Social Determinants of Health     Financial Resource Strain:     Difficulty of Paying Living Expenses: Not on file   Food Insecurity:     Worried About Running Out of Food in the Last Year: Not on file    Rony of Food in the Last Year: Not on file   Transportation Needs:     Lack of Transportation (Medical): Not on file    Lack of Transportation (Non-Medical):  Not on file   Physical Activity:     Days of Exercise per Week: Not on file    Minutes of Exercise per Session: Not on file   Stress:     Feeling of Stress : Not on file   Social Connections:     Frequency of Communication with Friends and Family: Not on file    Frequency of Social Gatherings with Friends and Family: Not on file    Attends [employer] Services: Not on file    Active Member of [address] [place] [address] or Organizations: Not on file    Attends Club or Organization Meetings: Not on file    Marital Status: Not on file   Intimate Partner Violence:     Fear of Current or Ex-Partner: Not on file    Emotionally Abused: Not on file    Physically Abused: Not on file    Sexually Abused: Not on file   Housing Stability:     Unable to Pay for Housing in the Last Year: Not on file    Number of [place] in the Last Year: Not on file    Unstable Housing in the Last Year: Not on file       Family History   Problem Relation Age of Onset    Diabetes Mother     Heart Disease Father        Current Outpatient Medications   Medication Sig Dispense Refill    NIFEdipine (PROCARDIA XL) 30 MG extended release tablet Take 30 mg by mouth daily      metoprolol tartrate (LOPRESSOR) 25 MG tablet TAKE 1/2 TABLET BY MOUTH TWICE DAILY 90 tablet 3    nitroGLYCERIN (NITROSTAT) 0.4 MG SL tablet Place 1 tablet under the tongue as needed for Chest pain 1 Tablet sublingual every 5 minutes x 3 doses PRN for chest pain. 25 tablet 2    pantoprazole (PROTONIX) 40 MG tablet Take 1 tablet by mouth daily 30 tablet 5    INSULIN SYRINGE 1CC/29G (B-D INS SYR ULTRAFINE 1CC/29G) 29G X 1/2\" 1 ML MISC Inject every 14 days 10 each 3    metFORMIN (GLUCOPHAGE XR) 500 MG extended release tablet Take 2 tablets by mouth daily (with breakfast) 180 tablet 1    ONETOUCH DELICA LANCETS 06H MISC 1 box by Does not apply route three times daily 300 each 3    bimatoprost (LUMIGAN) 0.03 % ophthalmic drops INSTILL 1 GTT IN OU QHS  5    cyanocobalamin 1000 MCG/ML injection INJECT 1 ML INTO THE MUSCLE EVERY 14 DAYS 10 mL 2    atorvastatin (LIPITOR) 20 MG tablet Take 1 tablet by mouth daily 90 tablet 2    levothyroxine (LEVOTHROID) 75 MCG tablet Take 1 tablet by mouth daily 90 tablet 3    ONE TOUCH ULTRA TEST strip TEST ONE TO THREE TIMES DAILY AS NEEDED 300 strip 3    albuterol sulfate HFA (PROAIR HFA) 108 (90 Base) MCG/ACT inhaler Inhale 2 puffs into the lungs every 6 hours as needed for Wheezing 1 Inhaler 3    aspirin 81 MG chewable tablet Take 81 mg by mouth daily      fluticasone (FLONASE) 50 MCG/ACT nasal spray 2 sprays each nostril daily 1 Bottle 1    timolol (TIMOPTIC) 0.5 % ophthalmic solution Place 1 drop into both eyes daily. No current facility-administered medications for this visit. Bee venom and Sulfa antibiotics    Review of Systems:  General ROS: positive for  - fatigue  Psychological ROS: negative  Hematological and Lymphatic ROS: No history of blood clots or bleeding disorder.    Respiratory ROS: positive for - shortness of breath  Cardiovascular ROS: positive for - chest pain, dyspnea on exertion and shortness of breath  Gastrointestinal ROS: no abdominal pain, change in bowel habits, or black or bloody stools  Genito-Urinary ROS: no dysuria, trouble voiding, or hematuria  Musculoskeletal ROS: negative  Neurological ROS: no TIA or stroke symptoms  Dermatological ROS: negative    VITALS:  Blood pressure 126/66, pulse 51, temperature 97.2 °F (36.2 °C), temperature source Infrared, weight 140 lb (63.5 kg). Body mass index is 22.6 kg/m². Physical Examination:  General appearance - alert, well appearing, and in no distress and normal appearing weight  Mental status - alert, oriented to person, place, and time  Neck - Neck is supple, no JVD or carotid bruits. No thyromegaly or adenopathy. Chest - clear to auscultation, no wheezes, rales or rhonchi, symmetric air entry  Heart - normal rate, regular rhythm, normal S1, S2, no murmurs, rubs, clicks or gallops  Abdomen - soft, nontender, nondistended, no masses or organomegaly  Neurological - alert, oriented, normal speech, no focal findings or movement disorder noted  Extremities - peripheral pulses normal, no pedal edema, no clubbing or cyanosis  Skin - normal coloration and turgor, no rashes, no suspicious skin lesions noted    Orders Placed This Encounter   Procedures    EKG 12 Lead       ASSESSMENT:     Diagnosis Orders   1. Essential hypertension     2. Angina at rest [facility]) - stable. nitroGLYCERIN (NITROSTAT) 0.4 MG SL tablet   3. SOB (shortness of breath)     4. Mixed hyperlipidemia     5. Mild aortic regurgitation           PLAN:     Patient will need to continue to follow up with you for their general medical care and return to see me in the office in 6 months    As always, aggressive risk factor modification is strongly recommended. We should adhere to the JNC VIII guidelines for HTN management and the NCEP ATP III guidelines for LDL-C management. Cardiac diet is always recommended with low fat, cholesterol, calories and sodium. Continue medications at current doses. ASA, STATIN, Lopressor. SL nitro as needed. Check EKG    See PCP for COPD.      If

## 2022-10-10 DIAGNOSIS — I20.8 ANGINA AT REST (HCC): ICD-10-CM

## 2022-10-10 NOTE — TELEPHONE ENCOUNTER
Please approve or deny this refill request. The order is pended. Thank you.     LOV 3/8/2022    Next Visit Date:  Future Appointments   Date Time Provider Katie Murillo   11/10/2022  2:00 PM Yo Bishop DO [facility]

## 2022-11-10 ENCOUNTER — OFFICE VISIT (OUTPATIENT)
Dept: CARDIOLOGY CLINIC | Age: 87
End: 2022-11-10
Payer: MEDICARE

## 2022-11-10 VITALS
SYSTOLIC BLOOD PRESSURE: 100 MMHG | WEIGHT: 142.2 LBS | DIASTOLIC BLOOD PRESSURE: 60 MMHG | HEART RATE: 59 BPM | BODY MASS INDEX: 22.95 KG/M2

## 2022-11-10 DIAGNOSIS — I10 ESSENTIAL HYPERTENSION: Primary | ICD-10-CM

## 2022-11-10 PROCEDURE — 99213 OFFICE O/P EST LOW 20 MIN: CPT | Performed by: INTERNAL MEDICINE

## 2022-11-10 PROCEDURE — G8484 FLU IMMUNIZE NO ADMIN: HCPCS | Performed by: INTERNAL MEDICINE

## 2022-11-10 PROCEDURE — 93000 ELECTROCARDIOGRAM COMPLETE: CPT | Performed by: INTERNAL MEDICINE

## 2022-11-10 PROCEDURE — G8427 DOCREV CUR MEDS BY ELIG CLIN: HCPCS | Performed by: INTERNAL MEDICINE

## 2022-11-10 PROCEDURE — 1090F PRES/ABSN URINE INCON ASSESS: CPT | Performed by: INTERNAL MEDICINE

## 2022-11-10 PROCEDURE — 1036F TOBACCO NON-USER: CPT | Performed by: INTERNAL MEDICINE

## 2022-11-10 PROCEDURE — 1123F ACP DISCUSS/DSCN MKR DOCD: CPT | Performed by: INTERNAL MEDICINE

## 2022-11-10 PROCEDURE — G8420 CALC BMI NORM PARAMETERS: HCPCS | Performed by: INTERNAL MEDICINE

## 2022-11-10 RX ORDER — LOSARTAN POTASSIUM 25 MG/1
25 TABLET ORAL DAILY
Qty: 90 TABLET | Refills: 3 | Status: SHIPPED | OUTPATIENT
Start: 2022-11-10

## 2022-11-10 NOTE — PROGRESS NOTES
Chief Complaint   Patient presents with    Hypertension    Hyperlipidemia    Follow-up     8 MONTH       2-27-18: Patient presents for initial medical evaluation. Patient is followed on a regular basis by Dr. Brenda Duenas MD. Was recently hospitalized at [facility] for CP. C.E were negative. CXR was negative. ECHO with normal LVF, mild LVH. Pain radiated to left arm. S/p normal nuclear stress test with EF of 75%. She is having a hard time breathing, states she can't catch her breath. She does get tired easily as well. She smoked for a long time and quit 25 yrs ago or so. She was a /. She denies any re occurrence of her CP. Pt denies chest pain, nausea, vomiting, diarrhea, constipation, motor weakness, insomnia, weight loss, syncope, dizziness, lightheadedness, palpitations, PND, orthopnea. EKG with RBBB. 5-29-18: s/p PFT showing severe COPD with good response to bronchodilators. She will see Dr. Yissel Trent next week. Does have SOB at baseline and worse with exertion. Better with breathing tx. Pt denies chest pain,   nausea, vomiting, diarrhea, constipation, motor weakness, insomnia, weight loss, syncope, dizziness, lightheadedness, palpitations, PND, orthopnea. BP and hr are good. CAD is stable. No LE discoloration or ulcers. No LE edema. No CHF type symptoms. Lipid profile is normal. No recent hospitalization. No change in meds. 11-27-18: doing ok overall. Has baseline SOB due to COPD. No smoking, she was a , . Pt denies chest pain,  fatigue,  nausea, vomiting, diarrhea, constipation, motor weakness, insomnia, weight loss, syncope, dizziness, lightheadedness, palpitations, PND, orthopnea, or claudication. No nitro use. BP is fernando today a bit. States it good at home and low at times. CAD is stable. No LE discoloration or ulcers. No LE edema. No CHF type symptoms. Lipid profile is normal. No recent hospitalization. No change in meds. Has severe COPD on PFT.  EKG with NSR, RBBB.     19: doing ok. Has COPD and chronic SOB. Pt denies chest pain, d,  nausea, vomiting, diarrhea, constipation, motor weakness, insomnia, weight loss, syncope, dizziness, lightheadedness, palpitations, PND, orthopnea, or claudication. No nitro use. BP and hr are good. CAD is stable. No LE discoloration or ulcers. No LE edema. No CHF type symptoms. Lipid profile is normal. No recent hospitalization. No change in meds. Has hx of esophageal stricture s/p dilation. EKG with NSR, RBBB. 20: son just  not too long ago. Dose have some hoarseness and coughing. Piero Can which helps. Pt denies chest pain,  nausea, vomiting, diarrhea, constipation, motor weakness, insomnia, weight loss, syncope, dizziness, lightheadedness, palpitations, PND, orthopnea, or claudication. Has hx of esophageal stricture s/p dilation. . BP and hr are good. No LE discoloration or ulcers. No LE edema. No CHF type symptoms. Lipid profile is normal. No recent hospitalization. No change in meds. Hx of severe COPD. S/p negative stress test in 2018.     20: EKG with NSR, RBBB. Has hx of severe COPD. Does not wear O2 at home yet. Pt denies chest pain,  nausea, vomiting, diarrhea, constipation, motor weakness, insomnia, weight loss, syncope, dizziness, lightheadedness, palpitations, PND, orthopnea, or claudication. Has hx of esophageal stricture s/p dilation. No smoking. Hx of negative stress test in 2018. No nitro use. BP and hr are good. . No LE discoloration or ulcers. No LE edema. No CHF type symptoms. Lipid profile is normal. No recent hospitalization. No change in meds. S/p CUS on 2020 with less than 50% b/l stenosis. 2021: Patient is having eye issues and follow-up with ophthalmology. Patient with history of severe COPD. Status post normal nuclear stress test in 2018. She denies any chest pain angina or CHF type symptoms. Lipid profile is normal.  No recent hospitalizations.   No change in any of her cardiac medications. She denies any smoking. Patient with history of diabetes, hypertension hyperlipidemia. Patient with history of tobacco abuse quit a long time ago. EKG with sinus bradycardia, right bundle branch block. Heart rate of 58 bpm.    9-7-21: Pt denies chest pain, dyspnea, dyspnea on exertion, change in exercise capacity, fatigue,  nausea, vomiting, diarrhea, constipation, motor weakness, insomnia, weight loss, syncope, dizziness, lightheadedness, palpitations, PND, orthopnea, or claudication. Hx of COPD, DM, HTN and HLD. Status post normal nuclear stress test in 2018. She denies any chest pain angina or CHF type symptoms. Lipid profile is normal.     3-8-22: has a lump in her neck and having some test by PCP. No angina. Sometimes has some SOB and improves with breathing tx. Hx of COPD, DM, HTN and HLD. Status post normal nuclear stress test in 2018. Pt denies chest pain,nausea, vomiting, diarrhea, constipation, motor weakness, insomnia, weight loss, syncope, dizziness, lightheadedness, palpitations, PND, orthopnea, or claudication. S/p CUS on 2/2020 with less than 50% b/l stenosis. EKG with NSR, RBBB. Chronic.     11-10-22:   Doing ok. No hospitalizations. BP and HR are good. Hx of COPD, DM, HTN and HLD. Status post normal nuclear stress test in 2018. Pt denies chest pain,nausea, vomiting, diarrhea, constipation, motor weakness, insomnia, weight loss, syncope, dizziness, lightheadedness, palpitations, PND, orthopnea, or claudication. Patient complains of lower extremity edema at the end of the day. She is on calcium channel blocker/Procardia  S/p CUS on 2/2020 with less than 50% b/l stenosis.    EKG with NSR, RBBB      Patient Active Problem List   Diagnosis    Type 2 diabetes mellitus without complication, without long-term current use of insulin (HCC)    Mixed hyperlipidemia    Essential hypertension    Glaucoma    Vitamin B 12 deficiency    Mild aortic regurgitation    Acquired hypothyroidism    SOB (shortness of breath)    JOSE (generalized anxiety disorder)    COPD (chronic obstructive pulmonary disease) ([facility] [address]    History of tobacco abuse       Past Surgical History:   Procedure Laterality Date    THYROIDECTOMY Right 1/9/15    DR. MUSTAFA    UPPER GASTROINTESTINAL ENDOSCOPY  03/01/2019    DR. CABALLERO       Social History     Socioeconomic History    Marital status:    Tobacco Use    Smoking status: Never    Smokeless tobacco: Never   Substance and Sexual Activity    Alcohol use: No    Drug use: No       Family History   Problem Relation Age of Onset    Diabetes Mother     Heart Disease Father        Current Outpatient Medications   Medication Sig Dispense Refill    losartan (COZAAR) 25 MG tablet Take 1 tablet by mouth daily 90 tablet 3    metoprolol tartrate (LOPRESSOR) 25 MG tablet 1/2 TABLET BID 90 tablet 1    nitroGLYCERIN (NITROSTAT) 0.4 MG SL tablet Place 1 tablet under the tongue as needed for Chest pain 1 Tablet sublingual every 5 minutes x 3 doses PRN for chest pain.  25 tablet 2    pantoprazole (PROTONIX) 40 MG tablet Take 1 tablet by mouth daily 30 tablet 5    INSULIN SYRINGE 1CC/29G (B-D INS SYR ULTRAFINE 1CC/29G) 29G X 1/2\" 1 ML MISC Inject every 14 days 10 each 3    metFORMIN (GLUCOPHAGE XR) 500 MG extended release tablet Take 2 tablets by mouth daily (with breakfast) 180 tablet 1    ONETOUCH DELICA LANCETS 15A MISC 1 box by Does not apply route three times daily 300 each 3    bimatoprost (LUMIGAN) 0.03 % ophthalmic drops INSTILL 1 GTT IN OU QHS  5    cyanocobalamin 1000 MCG/ML injection INJECT 1 ML INTO THE MUSCLE EVERY 14 DAYS 10 mL 2    atorvastatin (LIPITOR) 20 MG tablet Take 1 tablet by mouth daily 90 tablet 2    levothyroxine (LEVOTHROID) 75 MCG tablet Take 1 tablet by mouth daily 90 tablet 3    ONE TOUCH ULTRA TEST strip TEST ONE TO THREE TIMES DAILY AS NEEDED 300 strip 3    albuterol sulfate HFA (PROAIR HFA) 108 (90 Base) MCG/ACT inhaler Inhale 2 puffs into the lungs every 6 hours as needed for Wheezing 1 Inhaler 3    aspirin 81 MG chewable tablet Take 81 mg by mouth daily      fluticasone (FLONASE) 50 MCG/ACT nasal spray 2 sprays each nostril daily 1 Bottle 1    timolol (TIMOPTIC) 0.5 % ophthalmic solution Place 1 drop into both eyes daily. No current facility-administered medications for this visit. Bee venom and Sulfa antibiotics    Review of Systems:  General ROS: positive for  - fatigue  Psychological ROS: negative  Hematological and Lymphatic ROS: No history of blood clots or bleeding disorder. Respiratory ROS: positive for - shortness of breath  Cardiovascular ROS: positive for - chest pain, dyspnea on exertion and shortness of breath  Gastrointestinal ROS: no abdominal pain, change in bowel habits, or black or bloody stools  Genito-Urinary ROS: no dysuria, trouble voiding, or hematuria  Musculoskeletal ROS: negative  Neurological ROS: no TIA or stroke symptoms  Dermatological ROS: negative    VITALS:  Blood pressure 100/60, pulse 59, weight 142 lb 3.2 oz (64.5 kg). Body mass index is 22.95 kg/m². Physical Examination:  General appearance - alert, well appearing, and in no distress and normal appearing weight  Mental status - alert, oriented to person, place, and time  Neck - Neck is supple, no JVD or carotid bruits. No thyromegaly or adenopathy.    Chest - clear to auscultation, no wheezes, rales or rhonchi, symmetric air entry  Heart - normal rate, regular rhythm, normal S1, S2, no murmurs, rubs, clicks or gallops  Abdomen - soft, nontender, nondistended, no masses or organomegaly  Neurological - alert, oriented, normal speech, no focal findings or movement disorder noted  Extremities - peripheral pulses normal, no pedal edema, no clubbing or cyanosis  Skin - normal coloration and turgor, no rashes, no suspicious skin lesions noted    Orders Placed This Encounter   Procedures    EKG 12 lead       ASSESSMENT:     Diagnosis Orders   1. Essential hypertension     2. Angina at rest [facility]) - stable. nitroGLYCERIN (NITROSTAT) 0.4 MG SL tablet   3. SOB (shortness of breath)     4. Mixed hyperlipidemia     5. Mild aortic regurgitation           PLAN:     Patient will need to continue to follow up with you for their general medical care and return to see me in the office in 6 months    As always, aggressive risk factor modification is strongly recommended. We should adhere to the JNC VIII guidelines for HTN management and the NCEP ATP III guidelines for LDL-C management. Cardiac diet is always recommended with low fat, cholesterol, calories and sodium. Continue medications at current doses. ASA, STATIN, Lopressor. SL nitro as needed. Discontinue nifedipine due to lower extremity edema. Start losartan 25 mg daily. Check EKG    See PCP for COPD. If she develops recurent worsening SOB and CP, then will need LHC. Will continue to monitor patient clinically, if symptoms develop or worsen, they are to let me know ASAP or head to the nearest emergeny room. Patient was advised and encouraged to check blood pressure at home or at a pharmacy, maintain a logbook, and also call us back if blood pressure are above the target ranges or if it is low. Patient clearly understands and agrees to the instructions. We will need to continue to monitor muscle and liver enzymes, BUN, CR, and electrolytes. Details of medical condition explained and patient was warned about adverse consequences of uncontrolled medical conditions and possible side effects of prescribed medications.

## 2022-12-05 ENCOUNTER — TELEPHONE (OUTPATIENT)
Dept: CARDIOLOGY CLINIC | Age: 87
End: 2022-12-05

## 2022-12-05 NOTE — TELEPHONE ENCOUNTER
Pt calling stating that COZAAR makes her dizzy, nauseous, ect. Her PCP told her today to stop taking medication but to let Holiday know that she was told to stop taking medication. Please advise.

## 2023-01-25 PROBLEM — E53.8 VITAMIN B12 DEFICIENCY: Status: ACTIVE | Noted: 2023-01-25

## 2023-01-25 PROBLEM — H92.09 EAR PAIN: Status: ACTIVE | Noted: 2023-01-25

## 2023-01-25 PROBLEM — N18.31 TYPE 2 DIABETES MELLITUS WITH STAGE 3A CHRONIC KIDNEY DISEASE, WITHOUT LONG-TERM CURRENT USE OF INSULIN (MULTI): Status: ACTIVE | Noted: 2023-01-25

## 2023-01-25 PROBLEM — H40.9 GLAUCOMA: Status: ACTIVE | Noted: 2023-01-25

## 2023-01-25 PROBLEM — R26.89 DECREASED MOBILITY: Status: ACTIVE | Noted: 2023-01-25

## 2023-01-25 PROBLEM — E87.1 HYPONATREMIA: Status: ACTIVE | Noted: 2023-01-25

## 2023-01-25 PROBLEM — I10 ESSENTIAL HYPERTENSION: Status: ACTIVE | Noted: 2023-01-25

## 2023-01-25 PROBLEM — F41.1 GENERALIZED ANXIETY DISORDER: Status: ACTIVE | Noted: 2023-01-25

## 2023-01-25 PROBLEM — D64.9 CHRONIC ANEMIA: Status: ACTIVE | Noted: 2023-01-25

## 2023-01-25 PROBLEM — N18.31 CHRONIC KIDNEY DISEASE, STAGE 3A (MULTI): Status: ACTIVE | Noted: 2023-01-25

## 2023-01-25 PROBLEM — K21.9 GERD (GASTROESOPHAGEAL REFLUX DISEASE): Status: ACTIVE | Noted: 2023-01-25

## 2023-01-25 PROBLEM — E03.9 ACQUIRED HYPOTHYROIDISM: Status: ACTIVE | Noted: 2023-01-25

## 2023-01-25 PROBLEM — R63.4 WEIGHT LOSS: Status: ACTIVE | Noted: 2023-01-25

## 2023-01-25 PROBLEM — R22.1 NECK MASS: Status: ACTIVE | Noted: 2023-01-25

## 2023-01-25 PROBLEM — R13.10 DYSPHAGIA: Status: ACTIVE | Noted: 2023-01-25

## 2023-01-25 PROBLEM — J44.89 COPD WITH CHRONIC BRONCHITIS (MULTI): Status: ACTIVE | Noted: 2023-01-25

## 2023-01-25 PROBLEM — E11.22 TYPE 2 DIABETES MELLITUS WITH STAGE 3A CHRONIC KIDNEY DISEASE, WITHOUT LONG-TERM CURRENT USE OF INSULIN (MULTI): Status: ACTIVE | Noted: 2023-01-25

## 2023-01-25 PROBLEM — K22.4 ESOPHAGEAL SPASM: Status: ACTIVE | Noted: 2023-01-25

## 2023-01-25 PROBLEM — U07.1 SEVERE ACUTE RESPIRATORY SYNDROME CORONAVIRUS 2 (SARS-COV-2) DETECTED: Status: ACTIVE | Noted: 2023-01-25

## 2023-01-25 PROBLEM — E78.2 MIXED HYPERLIPIDEMIA: Status: ACTIVE | Noted: 2023-01-25

## 2023-01-25 PROBLEM — J30.9 ALLERGIC RHINITIS: Status: ACTIVE | Noted: 2023-01-25

## 2023-01-25 PROBLEM — R63.0 APPETITE LOSS: Status: ACTIVE | Noted: 2023-01-25

## 2023-01-25 PROBLEM — R39.9 UTI SYMPTOMS: Status: ACTIVE | Noted: 2023-01-25

## 2023-01-25 PROBLEM — N39.0 ACUTE UTI: Status: ACTIVE | Noted: 2023-01-25

## 2023-01-25 RX ORDER — ATORVASTATIN CALCIUM 20 MG/1
1 TABLET, FILM COATED ORAL DAILY
COMMUNITY
Start: 2018-06-05 | End: 2023-10-30 | Stop reason: SDUPTHER

## 2023-01-25 RX ORDER — BLOOD SUGAR DIAGNOSTIC
STRIP MISCELLANEOUS
COMMUNITY
Start: 2020-12-23 | End: 2023-03-06 | Stop reason: SDUPTHER

## 2023-01-25 RX ORDER — NITROGLYCERIN 0.4 MG/1
0.4 TABLET SUBLINGUAL EVERY 5 MIN PRN
COMMUNITY
End: 2024-06-03 | Stop reason: SDUPTHER

## 2023-01-25 RX ORDER — BIMATOPROST 0.3 MG/ML
SOLUTION/ DROPS OPHTHALMIC
COMMUNITY
Start: 2018-05-18

## 2023-01-25 RX ORDER — FLUTICASONE PROPIONATE 50 MCG
2 SPRAY, SUSPENSION (ML) NASAL DAILY
COMMUNITY
Start: 2021-06-29

## 2023-01-25 RX ORDER — METFORMIN HYDROCHLORIDE 500 MG/1
1 TABLET, EXTENDED RELEASE ORAL DAILY
COMMUNITY
Start: 2017-08-29 | End: 2023-03-06 | Stop reason: SDUPTHER

## 2023-01-25 RX ORDER — NIFEDIPINE 30 MG/1
1 TABLET, FILM COATED, EXTENDED RELEASE ORAL DAILY
COMMUNITY
Start: 2019-03-01 | End: 2023-06-02 | Stop reason: SDUPTHER

## 2023-01-25 RX ORDER — NAPROXEN SODIUM 220 MG/1
TABLET, FILM COATED ORAL
COMMUNITY

## 2023-01-25 RX ORDER — LEVOTHYROXINE SODIUM 50 UG/1
1 TABLET ORAL DAILY
COMMUNITY
Start: 2019-10-18 | End: 2023-09-11 | Stop reason: SDUPTHER

## 2023-01-25 RX ORDER — ALPRAZOLAM 1 MG/1
1 TABLET ORAL
COMMUNITY
Start: 2019-04-24 | End: 2023-03-09 | Stop reason: SDUPTHER

## 2023-01-25 RX ORDER — PANTOPRAZOLE SODIUM 40 MG/1
1 TABLET, DELAYED RELEASE ORAL DAILY
COMMUNITY
Start: 2020-05-18 | End: 2023-09-11 | Stop reason: SDUPTHER

## 2023-01-25 RX ORDER — METOPROLOL TARTRATE 25 MG/1
12.5 TABLET, FILM COATED ORAL
COMMUNITY
Start: 2018-05-29

## 2023-01-25 RX ORDER — ALBUTEROL SULFATE 90 UG/1
AEROSOL, METERED RESPIRATORY (INHALATION)
COMMUNITY
Start: 2018-04-03

## 2023-01-25 RX ORDER — LANCETS 33 GAUGE
EACH MISCELLANEOUS
COMMUNITY

## 2023-01-25 RX ORDER — CYANOCOBALAMIN 1000 UG/ML
1 INJECTION, SOLUTION INTRAMUSCULAR; SUBCUTANEOUS
COMMUNITY
Start: 2018-10-10

## 2023-03-06 ENCOUNTER — TELEPHONE (OUTPATIENT)
Dept: PRIMARY CARE | Facility: CLINIC | Age: 88
End: 2023-03-06
Payer: MEDICARE

## 2023-03-06 DIAGNOSIS — E11.22 TYPE 2 DIABETES MELLITUS WITH STAGE 3A CHRONIC KIDNEY DISEASE, WITHOUT LONG-TERM CURRENT USE OF INSULIN (MULTI): Primary | ICD-10-CM

## 2023-03-06 DIAGNOSIS — N18.31 TYPE 2 DIABETES MELLITUS WITH STAGE 3A CHRONIC KIDNEY DISEASE, WITHOUT LONG-TERM CURRENT USE OF INSULIN (MULTI): Primary | ICD-10-CM

## 2023-03-06 RX ORDER — METFORMIN HYDROCHLORIDE 500 MG/1
500 TABLET, EXTENDED RELEASE ORAL
Qty: 90 TABLET | Refills: 1 | Status: SHIPPED | OUTPATIENT
Start: 2023-03-06 | End: 2023-09-11 | Stop reason: SDUPTHER

## 2023-03-06 RX ORDER — BLOOD SUGAR DIAGNOSTIC
STRIP MISCELLANEOUS
Qty: 300 STRIP | Refills: 3 | Status: SHIPPED | OUTPATIENT
Start: 2023-03-06 | End: 2024-06-03 | Stop reason: SDUPTHER

## 2023-03-06 NOTE — TELEPHONE ENCOUNTER
Patient called in for a refill on her alprazolam 1 mg, Metformin 500 mg, and her one touch ultra in vitro test strips sent to [facility] in [facility] 12/5 NOV 3/9

## 2023-03-09 ENCOUNTER — OFFICE VISIT (OUTPATIENT)
Dept: PRIMARY CARE | Facility: CLINIC | Age: 88
End: 2023-03-09
Payer: MEDICARE

## 2023-03-09 VITALS
HEART RATE: 65 BPM | WEIGHT: 140 LBS | OXYGEN SATURATION: 96 % | TEMPERATURE: 97.6 F | DIASTOLIC BLOOD PRESSURE: 80 MMHG | SYSTOLIC BLOOD PRESSURE: 134 MMHG | HEIGHT: 65 IN | RESPIRATION RATE: 16 BRPM | BODY MASS INDEX: 23.32 KG/M2

## 2023-03-09 DIAGNOSIS — I10 ESSENTIAL HYPERTENSION: ICD-10-CM

## 2023-03-09 DIAGNOSIS — E03.9 ACQUIRED HYPOTHYROIDISM: ICD-10-CM

## 2023-03-09 DIAGNOSIS — E78.2 MIXED HYPERLIPIDEMIA: ICD-10-CM

## 2023-03-09 DIAGNOSIS — N18.31 CHRONIC KIDNEY DISEASE, STAGE 3A (MULTI): ICD-10-CM

## 2023-03-09 DIAGNOSIS — E11.22 TYPE 2 DIABETES MELLITUS WITH STAGE 3A CHRONIC KIDNEY DISEASE, WITHOUT LONG-TERM CURRENT USE OF INSULIN (MULTI): ICD-10-CM

## 2023-03-09 DIAGNOSIS — R07.89 CHEST WALL PAIN: ICD-10-CM

## 2023-03-09 DIAGNOSIS — F41.1 GENERALIZED ANXIETY DISORDER: ICD-10-CM

## 2023-03-09 DIAGNOSIS — Z00.00 ROUTINE GENERAL MEDICAL EXAMINATION AT HEALTH CARE FACILITY: Primary | ICD-10-CM

## 2023-03-09 DIAGNOSIS — N18.31 TYPE 2 DIABETES MELLITUS WITH STAGE 3A CHRONIC KIDNEY DISEASE, WITHOUT LONG-TERM CURRENT USE OF INSULIN (MULTI): ICD-10-CM

## 2023-03-09 DIAGNOSIS — N64.4 MASTALGIA: ICD-10-CM

## 2023-03-09 DIAGNOSIS — Z79.899 MEDICATION MANAGEMENT: ICD-10-CM

## 2023-03-09 DIAGNOSIS — J44.89 COPD WITH CHRONIC BRONCHITIS (MULTI): ICD-10-CM

## 2023-03-09 PROCEDURE — 1036F TOBACCO NON-USER: CPT | Performed by: FAMILY MEDICINE

## 2023-03-09 PROCEDURE — 1159F MED LIST DOCD IN RCRD: CPT | Performed by: FAMILY MEDICINE

## 2023-03-09 PROCEDURE — 3079F DIAST BP 80-89 MM HG: CPT | Performed by: FAMILY MEDICINE

## 2023-03-09 PROCEDURE — 3075F SYST BP GE 130 - 139MM HG: CPT | Performed by: FAMILY MEDICINE

## 2023-03-09 PROCEDURE — 1170F FXNL STATUS ASSESSED: CPT | Performed by: FAMILY MEDICINE

## 2023-03-09 PROCEDURE — G0439 PPPS, SUBSEQ VISIT: HCPCS | Performed by: FAMILY MEDICINE

## 2023-03-09 PROCEDURE — 1160F RVW MEDS BY RX/DR IN RCRD: CPT | Performed by: FAMILY MEDICINE

## 2023-03-09 PROCEDURE — 99214 OFFICE O/P EST MOD 30 MIN: CPT | Performed by: FAMILY MEDICINE

## 2023-03-09 RX ORDER — ALPRAZOLAM 1 MG/1
1 TABLET ORAL 2 TIMES DAILY PRN
Qty: 60 TABLET | Refills: 1 | Status: SHIPPED | OUTPATIENT
Start: 2023-03-09 | End: 2023-06-09 | Stop reason: SDUPTHER

## 2023-03-09 ASSESSMENT — PATIENT HEALTH QUESTIONNAIRE - PHQ9
2. FEELING DOWN, DEPRESSED OR HOPELESS: NOT AT ALL
SUM OF ALL RESPONSES TO PHQ9 QUESTIONS 1 AND 2: 0
2. FEELING DOWN, DEPRESSED OR HOPELESS: NOT AT ALL
1. LITTLE INTEREST OR PLEASURE IN DOING THINGS: NOT AT ALL
SUM OF ALL RESPONSES TO PHQ9 QUESTIONS 1 AND 2: 0
1. LITTLE INTEREST OR PLEASURE IN DOING THINGS: NOT AT ALL

## 2023-03-09 ASSESSMENT — ACTIVITIES OF DAILY LIVING (ADL)
BATHING: INDEPENDENT
DOING_HOUSEWORK: NEEDS ASSISTANCE
TAKING_MEDICATION: INDEPENDENT
GROCERY_SHOPPING: NEEDS ASSISTANCE
DRESSING: INDEPENDENT
MANAGING_FINANCES: INDEPENDENT

## 2023-03-09 ASSESSMENT — ENCOUNTER SYMPTOMS
DEPRESSION: 0
OCCASIONAL FEELINGS OF UNSTEADINESS: 0
LOSS OF SENSATION IN FEET: 1

## 2023-03-09 ASSESSMENT — PAIN SCALES - GENERAL: PAINLEVEL: 5

## 2023-03-09 NOTE — ASSESSMENT & PLAN NOTE
Diabetes Mellitus type II, under good control.  1. Rx changes:   2. Education: Reviewed ‘ABCs’ of diabetes management (respective goals in parentheses):  A1C (<7), blood pressure (<130/80), and cholesterol (LDL <100).  3. Compliance at present is estimated to be good. Efforts to improve compliance (if necessary) will be directed at dietary modifications: and increased exercise.  4. Follow up: 4 months

## 2023-03-09 NOTE — PROGRESS NOTES
Chief Complaint   Patient presents with    Medicare Annual Wellness Visit Subsequent    Follow-up     Diabetes, Hypertension, Hyperlipidemia, Hypothyroidism and labs    Chest Pain     Chest wall pain     Katelyn Weiss is a 88 y.o. female who presents for Medicare Annual Wellness Visit.  Patient was last seen 3 months ago.      Patient presents for follow up of diabetes. Current symptoms include: none. Symptoms have stabilized. Patient denies foot ulcerations, nausea, polydipsia, polyuria, visual disturbances, vomiting, and weight loss. Evaluation to date has included: fasting blood sugar, fasting lipid panel, hemoglobin A1C, and microalbuminuria.  Home sugars: BGs are running  consistent with Hgb A1C. Current treatment: more intensive attention to diet which has been somewhat effective, low cholesterol diet which has been somewhat effective, and increased aerobic exercise which has been somewhat effective. Last dilated eye exam:   Patient denies chest pain, claudication, dyspnea, exertional chest pressure/discomfort, near-syncope, orthopnea, palpitations, paroxysmal nocturnal dyspnea, and syncope      Chest Wall Pain  Patient presents for evaluation of chest wall pain. Onset was 2 months ago. Symptoms have been unchanged since that time. The patient describes the pain as aching and shooting in the lateral chest wall: on the left. Associated symptoms are: none. Aggravating factors are: none. Alleviating factors are: none. Mechanism of injury: none known. Previous visits for this problem: none. Evaluation to date: none. Treatment to date: none.     OARRS:  3/9/2023  I have personally reviewed the OARRS report for Katelyn Weiss. I have considered the risks of abuse, dependence, addiction and diversion    Is the patient prescribed a combination of a benzodiazepine and opioid?  No    Last Urine Drug Screen / ordered today: Yes  Recent Results (from the past [id number] hour(s))   OPIATE/OPIOID/BENZO PRESCRIPTION COMPLIANCE     Collection Time: 09/09/21 12:18 PM   Result Value Ref Range    DRUG SCREEN COMMENT URINE SEE BELOW     Creatine, Urine 119.2 mg/dL    Amphetamine Screen, Urine PRESUMPTIVE NEGATIVE NEGATIVE    Barbiturate Screen, Urine PRESUMPTIVE NEGATIVE NEGATIVE    Cannabinoid Screen, Urine PRESUMPTIVE NEGATIVE NEGATIVE    Cocaine Screen, Urine PRESUMPTIVE NEGATIVE NEGATIVE    PCP Screen, Urine PRESUMPTIVE NEGATIVE NEGATIVE    7-Aminoclonazepam <25 Cutoff <25 ng/mL    Alpha-Hydroxyalprazolam 620 (A) Cutoff <25 ng/mL    Alpha-Hydroxymidazolam <25 Cutoff <25 ng/mL    Alprazolam 263 (A) Cutoff <25 ng/mL    Chlordiazepoxide <25 Cutoff <25 ng/mL    Clonazepam <25 Cutoff <25 ng/mL    Diazepam <25 Cutoff <25 ng/mL    Lorazepam <25 Cutoff <25 ng/mL    Midazolam <25 Cutoff <25 ng/mL    Nordiazepam <25 Cutoff <25 ng/mL    Oxazepam <25 Cutoff <25 ng/mL    Temazepam <25 Cutoff <25 ng/mL    Zolpidem <25 Cutoff <25 ng/mL    Zolpidem Metabolite (ZCA) <25 Cutoff <25 ng/mL    6-Acetylmorphine <25 Cutoff <25 ng/mL    Codeine <50 Cutoff <50 ng/mL    Hydrocodone <25 Cutoff <25 ng/mL    Hydromorphone <25 Cutoff <25 ng/mL    Morphine Urine <50 Cutoff <50 ng/mL    Norhydrocodone <25 Cutoff <25 ng/mL    Noroxycodone <25 Cutoff <25 ng/mL    Oxycodone <25 Cutoff <25 ng/mL    Oxymorphone <25 Cutoff <25 ng/mL    Tramadol <50 Cutoff <50 ng/mL    O-Desmethyltramadol <50 Cutoff <50 ng/mL    Fentanyl <2.5 Cutoff<2.5 ng/mL    Norfentanyl <2.5 Cutoff<2.5 ng/mL    METHADONE CONFIRMATION,URINE <25 Cutoff <25 ng/mL    EDDP <25 Cutoff <25 ng/mL     Results are as expected.     Controlled Substance Agreement:  Date of the Last Agreement: 3/9/2023  Reviewed Controlled Substance Agreement including but not limited to the benefits, risks, and alternatives to treatment with a Controlled Substance medication(s).    Benzodiazepines:  What is the patient's goal of therapy? Able to function without being unduly anxious and carry out day-to-day activities without tension  and panic attacks    Is this being achieved with current treatment? yes    JOSE-7:  No data recorded    Activities of Daily Living:   Is your overall impression that this patient is benefiting (symptom reduction outweighs side effects) from benzodiazepine therapy? Yes     1. Physical Functioning: Better  2. Family Relationship: Better  3. Social Relationship: Better  4. Mood: Better  5. Sleep Patterns: Better  6. Overall Function: Better    Patient Active Problem List   Diagnosis    Acquired hypothyroidism    Acute UTI    Allergic rhinitis    Appetite loss    Chronic anemia    Chronic kidney disease, stage 3a    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HCC)    Decreased mobility    Dysphagia    Ear pain    Esophageal spasm    GERD (gastroesophageal reflux disease)    Essential hypertension    Generalized anxiety disorder    Glaucoma    Hyponatremia    Mixed hyperlipidemia    Neck mass    Severe acute respiratory syndrome coronavirus 2 (SARS-CoV-2) detected    Type 2 diabetes mellitus with stage 3a chronic kidney disease, without long-term current use of insulin (CMS/[facility])    UTI symptoms    Vitamin B12 deficiency    Weight loss       has a current medication list which includes the following prescription(s): albuterol, aspirin, atorvastatin, bimatoprost, cyanocobalamin, fluticasone, lancets, levothyroxine, metformin xr, metoprolol tartrate, nifedipine cc, nitroglycerin, onetouch ultra test, pantoprazole, and alprazolam.    Past Medical History:   Diagnosis Date    Acute sinusitis, unspecified 04/24/2019    Acute non-recurrent sinusitis, unspecified location    Encounter for general adult medical examination without abnormal findings 02/19/2020    Routine general medical examination at health care facility    Personal history of other endocrine, nutritional and metabolic disease     History of thyroid disorder       Past Surgical History:   Procedure Laterality Date    MR CHEST ANGIO W IV CONTRAST  12/5/2014    MR CHEST ANGIO  W IV CONTRAST 12/5/2014 [facility]    OTHER SURGICAL HISTORY  02/27/2019    Umbilical hernia repair       family history includes Aneurysm in her father; Colon cancer in her daughter.    Social History     Socioeconomic History    Marital status:      Spouse name: Not on file    Number of children: Not on file    Years of education: Not on file    Highest education level: Not on file   Occupational History    Not on file   Tobacco Use    Smoking status: Never    Smokeless tobacco: Never   Vaping Use    Vaping status: Not on file   Substance and Sexual Activity    Alcohol use: Never    Drug use: Never    Sexual activity: Not on file   Other Topics Concern    Not on file   Social History Narrative    Not on file     Social Determinants of Health     Financial Resource Strain: Not on file   Food Insecurity: Not on file   Transportation Needs: Not on file   Physical Activity: Not on file   Stress: Not on file   Social Connections: Not on file   Intimate Partner Violence: Not on file   Housing Stability: Not on file       Allergies   Allergen Reactions    Bee Venom Protein (Honey Bee) Anaphylaxis    Losartan Potassium Unknown    Sulfamethoxazole Unknown     Review of Systems - General ROS: negative for - fatigue, fever, malaise, night sweats, sleep disturbance or weight loss  ENT ROS: negative for - hearing change, nasal discharge, oral lesions, sinus pain, sore throat, tinnitus or vertigo  Allergy and Immunology ROS: negative for - hives, nasal congestion or seasonal allergies  Hematological and Lymphatic ROS: negative for - bruising, fatigue, night sweats or pallor  Endocrine ROS: negative for - hot flashes, malaise/lethargy, palpitations, polydipsia/polyuria, skin changes, temperature intolerance or unexpected weight changes  Respiratory ROS: negative for - cough, hemoptysis, pleuritic pain, shortness of breath or wheezing  Cardiovascular ROS: no chest pain or dyspnea on exertion  Gastrointestinal  "ROS: no abdominal pain, change in bowel habits, or black or bloody stools  Genito-Urinary ROS: no dysuria, trouble voiding, or hematuria  Musculoskeletal ROS: negative for - joint pain, joint stiffness, joint swelling, muscle pain or muscular weakness  Neurological ROS: negative for - dizziness, gait disturbance, headaches, impaired coordination/balance, numbness/tingling, tremors or visual changes  Dermatological ROS: negative for - dry skin, lumps, pruritus or rash    Blood pressure 134/80, pulse 65, temperature 36.4 °C (97.6 °F), resp. rate 16, height 1.651 m (5' 5\"), weight 63.5 kg (140 lb), SpO2 96 %.    Physical Examination: General appearance - alert, well appearing, and in no distress  Mental status - alert, oriented to person, place, and time  Eyes - pupils equal and reactive, extraocular eye movements intact  Ears - bilateral TM's and external ear canals normal  Mouth - mucous membranes moist, pharynx normal without lesions  Neck - supple, no significant adenopathy  Lymphatics - no palpable lymphadenopathy, no hepatosplenomegaly  Chest - clear to auscultation, no wheezes, rales or rhonchi, symmetric air entry  Heart - normal rate, regular rhythm, normal S1, S2, no murmurs, rubs, clicks or gallops  Abdomen - soft, nontender, nondistended, no masses or organomegaly  Neurological - alert, oriented, normal speech, no focal findings or movement disorder noted  Musculoskeletal - no joint tenderness, deformity or swelling  Extremities: peripheral pulses normal, no pedal edema, no clubbing or cyanosis.    Problem List Items Addressed This Visit          Respiratory    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HCC)     Chronic Condition Documentation : Stable based on symptoms and exam.  Continue established treatment plan and follow-up at least yearly.              Circulatory    Essential hypertension     Under good control. Continue current medications, diet and exercise.           Relevant Orders    Follow Up In " [facility] - PCP       Genitourinary    Chronic kidney disease, stage 3a     Chronic Condition Documentation : Stable based on symptoms and exam.  Continue established treatment plan and follow-up at least yearly.          Relevant Orders    Follow Up In [facility] - PCP       Endocrine/Metabolic    Acquired hypothyroidism    Relevant Orders    Thyroid Stimulating Hormone    Thyroxine, Free    Follow Up In [facility] - PCP    Type 2 diabetes mellitus with stage 3a chronic kidney disease, without long-term current use of insulin (CMS/[facility])     Diabetes Mellitus type II, under good control.  1. Rx changes:   2. Education: Reviewed ‘ABCs’ of diabetes management (respective goals in parentheses):  A1C (<7), blood pressure (<130/80), and cholesterol (LDL <100).  3. Compliance at present is estimated to be good. Efforts to improve compliance (if necessary) will be directed at dietary modifications: and increased exercise.  4. Follow up: 4 months           Relevant Orders    CBC and Auto Differential    Comprehensive Metabolic Panel    Hemoglobin A1C    Lipid Panel    Follow Up In [facility] - PCP       Other    Generalized anxiety disorder    Relevant Medications    ALPRAZolam (Xanax) 1 mg tablet    Other Relevant Orders    Follow Up In [facility] - PCP    Mixed hyperlipidemia     Under good control. Continue current medications, diet and exercise.           Relevant Orders    Follow Up In [facility] - PCP     Other Visit Diagnoses       Routine general medical examination at health care facility    -  Primary    Medication management        Mastalgia        Relevant Orders    BI mammo bilateral diagnostic    Chest wall pain        Relevant Orders    XR chest 2 views    XR ribs left 2 views                Orders Placed This Encounter   Procedures    XR chest 2 views     Standing Status:   Future     Standing Expiration Date:   3/9/2024     Order Specific  Question:   Reason for exam:     Answer:   chest wall pain     Order Specific Question:   Radiologist to Determine Optimal Study     Answer:   Yes     Order Specific Question:   Release to patient     Answer:   Immediate     Order Specific Question:   Is this exam part of a Research Protocol?     Answer:   No    XR ribs left 2 views     Standing Status:   Future     Standing Expiration Date:   3/9/2024     Order Specific Question:   Reason for exam:     Answer:   chest wall pain     Order Specific Question:   Radiologist to Determine Optimal Study     Answer:   Yes     Order Specific Question:   Release to patient     Answer:   Immediate     Order Specific Question:   Is this exam part of a Research Protocol?     Answer:   No    BI mammo bilateral diagnostic     Standing Status:   Future     Standing Expiration Date:   5/9/2024     Order Specific Question:   Reason for exam:     Answer:   mastalgia     Order Specific Question:   Radiologist to Determine Optimal Study     Answer:   Yes     Order Specific Question:   Release to patient     Answer:   Immediate     Order Specific Question:   Is this exam part of a Research Protocol?     Answer:   No    CBC and Auto Differential     Standing Status:   Future     Standing Expiration Date:   3/9/2024     Order Specific Question:   Release to patient     Answer:   Immediate    Comprehensive Metabolic Panel     Standing Status:   Future     Standing Expiration Date:   3/9/2024     Order Specific Question:   Release to patient     Answer:   Immediate    Hemoglobin A1C     Standing Status:   Future     Standing Expiration Date:   6/9/2023     Order Specific Question:   Release to patient     Answer:   Immediate    Lipid Panel     Standing Status:   Future     Standing Expiration Date:   6/9/2023     Order Specific Question:   Release to patient     Answer:   Immediate    Thyroid Stimulating Hormone     Standing Status:   Future     Standing Expiration Date:   3/9/2024      Order Specific Question:   Release to patient     Answer:   Immediate    Thyroxine, Free     Standing Status:   Future     Standing Expiration Date:   3/9/2024     Order Specific Question:   Release to patient     Answer:   Immediate       Outpatient Encounter Medications as of 3/9/2023   Medication Sig Dispense Refill    albuterol 90 mcg/actuation inhaler Inhale. INHALE 1 TO 2 PUFFS EVERY 4 TO 6 HOURS AS NEEDED.      aspirin 81 mg chewable tablet Chew.      atorvastatin (Lipitor) 20 mg tablet Take 1 tablet (20 mg) by mouth once daily.      bimatoprost (Lumigan) 0.03 % ophthalmic solution Administer into affected eye(s).      cyanocobalamin (Vitamin B-12) 1,000 mcg/mL injection 1 mL (1,000 mcg) every 14 (fourteen) days. Inject      fluticasone (Flonase) 50 mcg/actuation nasal spray Administer 2 sprays into each nostril once daily. SHAKE LIQUID      lancets 33 gauge misc       levothyroxine (Synthroid, Levoxyl) 50 mcg tablet Take 1 tablet (50 mcg) by mouth once daily.      metFORMIN XR (Glucophage-XR) 500 mg 24 hr tablet Take 1 tablet (500 mg) by mouth once daily in the evening. Take with meals. 90 tablet 1    metoprolol tartrate (Lopressor) 25 mg tablet Take 0.5 tablets (12.5 mg) by mouth. 2 times daily      NIFEdipine CC (Adalat CC) 30 mg 24 hr tablet Take 1 tablet (30 mg) by mouth once daily.      nitroglycerin (Nitrostat) 0.4 mg SL tablet Place 1 tablet (0.4 mg) under the tongue every 5 minutes if needed.      OneTouch Ultra Test strip TEST 3 TIMES DAILY.TEST 3 TIMES DAILY. 300 strip 3    pantoprazole (ProtoNix) 40 mg EC tablet Take 1 tablet (40 mg) by mouth once daily.      [DISCONTINUED] ALPRAZolam (Xanax) 1 mg tablet Take 1 tablet (1 mg) by mouth. TWICE DAILY AS NEEDED FOR ANXIETY      ALPRAZolam (Xanax) 1 mg tablet Take 1 tablet (1 mg) by mouth 2 times a day as needed for anxiety. 60 tablet 1    [DISCONTINUED] metFORMIN XR (Glucophage-XR) 500 mg 24 hr tablet Take 1 tablet (500 mg) by mouth 1 (one) time each  day.      [DISCONTINUED] OneTouch Ultra Test strip TEST 3 TIMES DAILY.       No facility-administered encounter medications on file as of 3/9/2023.     The nature of cardiac risk has been fully discussed with this patient. I have made  aware of  LDL target goal given  cardiovascular risk analysis. I have discussed the appropriate diet. The need for lifelong compliance in order to reduce risk is stressed. A regular exercise program is recommended to help achieve and maintain normal body weight, fitness and improve lipid balance. A written copy of a low fat, low cholesterol diet has been given to the patient.    Patient education provided. They understand and agree with this course of treatment. They will return with new or worsening symptoms. Patient instructed to remain current with appropriate annual health maintenance.     Scribe Attestation  By signing my name below, I, Meghana Ricketts   attest that this documentation has been prepared under the direction and in the presence of Darius Quezada MD.

## 2023-03-10 NOTE — ASSESSMENT & PLAN NOTE
Chronic Condition Documentation : Stable based on symptoms and exam.  Continue established treatment plan and follow-up at least yearly.

## 2023-03-17 ENCOUNTER — LAB (OUTPATIENT)
Dept: LAB | Facility: LAB | Age: 88
End: 2023-03-17
Payer: MEDICARE

## 2023-03-17 DIAGNOSIS — E03.9 ACQUIRED HYPOTHYROIDISM: ICD-10-CM

## 2023-03-17 DIAGNOSIS — N18.31 TYPE 2 DIABETES MELLITUS WITH STAGE 3A CHRONIC KIDNEY DISEASE, WITHOUT LONG-TERM CURRENT USE OF INSULIN (MULTI): ICD-10-CM

## 2023-03-17 DIAGNOSIS — E11.22 TYPE 2 DIABETES MELLITUS WITH STAGE 3A CHRONIC KIDNEY DISEASE, WITHOUT LONG-TERM CURRENT USE OF INSULIN (MULTI): ICD-10-CM

## 2023-03-17 LAB
ALANINE AMINOTRANSFERASE (SGPT) (U/L) IN SER/PLAS: 9 U/L (ref 7–45)
ALBUMIN (G/DL) IN SER/PLAS: 4.3 G/DL (ref 3.4–5)
ALKALINE PHOSPHATASE (U/L) IN SER/PLAS: 84 U/L (ref 33–136)
ANION GAP IN SER/PLAS: 12 MMOL/L (ref 10–20)
ASPARTATE AMINOTRANSFERASE (SGOT) (U/L) IN SER/PLAS: 14 U/L (ref 9–39)
BASOPHILS (10*3/UL) IN BLOOD BY AUTOMATED COUNT: 0.08 X10E9/L (ref 0–0.1)
BASOPHILS/100 LEUKOCYTES IN BLOOD BY AUTOMATED COUNT: 1.3 % (ref 0–2)
BILIRUBIN TOTAL (MG/DL) IN SER/PLAS: 0.6 MG/DL (ref 0–1.2)
CALCIUM (MG/DL) IN SER/PLAS: 9.4 MG/DL (ref 8.6–10.3)
CARBON DIOXIDE, TOTAL (MMOL/L) IN SER/PLAS: 29 MMOL/L (ref 21–32)
CHLORIDE (MMOL/L) IN SER/PLAS: 98 MMOL/L (ref 98–107)
CHOLESTEROL (MG/DL) IN SER/PLAS: 135 MG/DL (ref 0–199)
CHOLESTEROL IN HDL (MG/DL) IN SER/PLAS: 43.3 MG/DL
CHOLESTEROL/HDL RATIO: 3.1
CREATININE (MG/DL) IN SER/PLAS: 0.87 MG/DL (ref 0.5–1.05)
EOSINOPHILS (10*3/UL) IN BLOOD BY AUTOMATED COUNT: 0.23 X10E9/L (ref 0–0.4)
EOSINOPHILS/100 LEUKOCYTES IN BLOOD BY AUTOMATED COUNT: 3.6 % (ref 0–6)
ERYTHROCYTE DISTRIBUTION WIDTH (RATIO) BY AUTOMATED COUNT: 12.2 % (ref 11.5–14.5)
ERYTHROCYTE MEAN CORPUSCULAR HEMOGLOBIN CONCENTRATION (G/DL) BY AUTOMATED: 32.7 G/DL (ref 32–36)
ERYTHROCYTE MEAN CORPUSCULAR VOLUME (FL) BY AUTOMATED COUNT: 91 FL (ref 80–100)
ERYTHROCYTES (10*6/UL) IN BLOOD BY AUTOMATED COUNT: 4.18 X10E12/L (ref 4–5.2)
ESTIMATED AVERAGE GLUCOSE FOR HBA1C: 148 MG/DL
GFR FEMALE: 64 ML/MIN/1.73M2
GLUCOSE (MG/DL) IN SER/PLAS: 152 MG/DL (ref 74–99)
HEMATOCRIT (%) IN BLOOD BY AUTOMATED COUNT: 37.9 % (ref 36–46)
HEMOGLOBIN (G/DL) IN BLOOD: 12.4 G/DL (ref 12–16)
HEMOGLOBIN A1C/HEMOGLOBIN TOTAL IN BLOOD: 6.8 %
IMMATURE GRANULOCYTES/100 LEUKOCYTES IN BLOOD BY AUTOMATED COUNT: 0.3 % (ref 0–0.9)
LDL: 65 MG/DL (ref 0–99)
LEUKOCYTES (10*3/UL) IN BLOOD BY AUTOMATED COUNT: 6.4 X10E9/L (ref 4.4–11.3)
LYMPHOCYTES (10*3/UL) IN BLOOD BY AUTOMATED COUNT: 1.81 X10E9/L (ref 0.8–3)
LYMPHOCYTES/100 LEUKOCYTES IN BLOOD BY AUTOMATED COUNT: 28.5 % (ref 13–44)
MONOCYTES (10*3/UL) IN BLOOD BY AUTOMATED COUNT: 0.45 X10E9/L (ref 0.05–0.8)
MONOCYTES/100 LEUKOCYTES IN BLOOD BY AUTOMATED COUNT: 7.1 % (ref 2–10)
NEUTROPHILS (10*3/UL) IN BLOOD BY AUTOMATED COUNT: 3.77 X10E9/L (ref 1.6–5.5)
NEUTROPHILS/100 LEUKOCYTES IN BLOOD BY AUTOMATED COUNT: 59.2 % (ref 40–80)
PLATELETS (10*3/UL) IN BLOOD AUTOMATED COUNT: 359 X10E9/L (ref 150–450)
POTASSIUM (MMOL/L) IN SER/PLAS: 4.1 MMOL/L (ref 3.5–5.3)
PROTEIN TOTAL: 6.9 G/DL (ref 6.4–8.2)
SODIUM (MMOL/L) IN SER/PLAS: 135 MMOL/L (ref 136–145)
THYROTROPIN (MIU/L) IN SER/PLAS BY DETECTION LIMIT <= 0.05 MIU/L: 1.57 MIU/L (ref 0.44–3.98)
THYROXINE (T4) FREE (NG/DL) IN SER/PLAS: 1.12 NG/DL (ref 0.61–1.12)
TRIGLYCERIDE (MG/DL) IN SER/PLAS: 132 MG/DL (ref 0–149)
UREA NITROGEN (MG/DL) IN SER/PLAS: 11 MG/DL (ref 6–23)
VLDL: 26 MG/DL (ref 0–40)

## 2023-03-17 PROCEDURE — 80053 COMPREHEN METABOLIC PANEL: CPT

## 2023-03-17 PROCEDURE — 84443 ASSAY THYROID STIM HORMONE: CPT

## 2023-03-17 PROCEDURE — 36415 COLL VENOUS BLD VENIPUNCTURE: CPT

## 2023-03-17 PROCEDURE — 80061 LIPID PANEL: CPT

## 2023-03-17 PROCEDURE — 85025 COMPLETE CBC W/AUTO DIFF WBC: CPT

## 2023-03-17 PROCEDURE — 83036 HEMOGLOBIN GLYCOSYLATED A1C: CPT

## 2023-03-17 PROCEDURE — 84439 ASSAY OF FREE THYROXINE: CPT

## 2023-05-28 DIAGNOSIS — I20.8 ANGINA AT REST (HCC): ICD-10-CM

## 2023-05-30 NOTE — TELEPHONE ENCOUNTER
Requesting medication refill. Please approve or deny this request.    Rx requested:  Requested Prescriptions     Pending Prescriptions Disp Refills    metoprolol tartrate (LOPRESSOR) 25 MG tablet [Pharmacy Med Name: METOPROLOL TARTRATE 25MG  TABLETS] 90 tablet 1     Sig: TAKE 1/2 TABLET BY MOUTH TWICE DAILY         Last Office Visit:   11/10/2022      Next Visit Date:  Future Appointments   Date Time Provider Katie Murillo   11/14/2023 10:15 AM Yo Donovan, DO [facility]               Last refill 10/10/2022. Please approve or deny.

## 2023-06-02 DIAGNOSIS — I10 ESSENTIAL HYPERTENSION: ICD-10-CM

## 2023-06-02 LAB
ALANINE AMINOTRANSFERASE (SGPT) (U/L) IN SER/PLAS: 8 U/L (ref 7–45)
ALBUMIN (G/DL) IN SER/PLAS: 4.2 G/DL (ref 3.4–5)
ALKALINE PHOSPHATASE (U/L) IN SER/PLAS: 72 U/L (ref 33–136)
ANION GAP IN SER/PLAS: 11 MMOL/L (ref 10–20)
ASPARTATE AMINOTRANSFERASE (SGOT) (U/L) IN SER/PLAS: 15 U/L (ref 9–39)
BASOPHILS (10*3/UL) IN BLOOD BY AUTOMATED COUNT: 0.07 X10E9/L (ref 0–0.1)
BASOPHILS/100 LEUKOCYTES IN BLOOD BY AUTOMATED COUNT: 1.3 % (ref 0–2)
BILIRUBIN TOTAL (MG/DL) IN SER/PLAS: 0.7 MG/DL (ref 0–1.2)
CALCIUM (MG/DL) IN SER/PLAS: 8.9 MG/DL (ref 8.6–10.3)
CARBON DIOXIDE, TOTAL (MMOL/L) IN SER/PLAS: 31 MMOL/L (ref 21–32)
CHLORIDE (MMOL/L) IN SER/PLAS: 96 MMOL/L (ref 98–107)
CHOLESTEROL (MG/DL) IN SER/PLAS: 135 MG/DL (ref 0–199)
CHOLESTEROL IN HDL (MG/DL) IN SER/PLAS: 44 MG/DL
CHOLESTEROL/HDL RATIO: 3.1
CREATININE (MG/DL) IN SER/PLAS: 0.82 MG/DL (ref 0.5–1.05)
EOSINOPHILS (10*3/UL) IN BLOOD BY AUTOMATED COUNT: 0.19 X10E9/L (ref 0–0.4)
EOSINOPHILS/100 LEUKOCYTES IN BLOOD BY AUTOMATED COUNT: 3.6 % (ref 0–6)
ERYTHROCYTE DISTRIBUTION WIDTH (RATIO) BY AUTOMATED COUNT: 12.2 % (ref 11.5–14.5)
ERYTHROCYTE MEAN CORPUSCULAR HEMOGLOBIN CONCENTRATION (G/DL) BY AUTOMATED: 33.1 G/DL (ref 32–36)
ERYTHROCYTE MEAN CORPUSCULAR VOLUME (FL) BY AUTOMATED COUNT: 92 FL (ref 80–100)
ERYTHROCYTES (10*6/UL) IN BLOOD BY AUTOMATED COUNT: 3.86 X10E12/L (ref 4–5.2)
ESTIMATED AVERAGE GLUCOSE FOR HBA1C: 140 MG/DL
GFR FEMALE: 69 ML/MIN/1.73M2
GLUCOSE (MG/DL) IN SER/PLAS: 121 MG/DL (ref 74–99)
HEMATOCRIT (%) IN BLOOD BY AUTOMATED COUNT: 35.4 % (ref 36–46)
HEMOGLOBIN (G/DL) IN BLOOD: 11.7 G/DL (ref 12–16)
HEMOGLOBIN A1C/HEMOGLOBIN TOTAL IN BLOOD: 6.5 %
IMMATURE GRANULOCYTES/100 LEUKOCYTES IN BLOOD BY AUTOMATED COUNT: 0.2 % (ref 0–0.9)
LDL: 69 MG/DL (ref 0–99)
LEUKOCYTES (10*3/UL) IN BLOOD BY AUTOMATED COUNT: 5.3 X10E9/L (ref 4.4–11.3)
LYMPHOCYTES (10*3/UL) IN BLOOD BY AUTOMATED COUNT: 1.41 X10E9/L (ref 0.8–3)
LYMPHOCYTES/100 LEUKOCYTES IN BLOOD BY AUTOMATED COUNT: 26.5 % (ref 13–44)
MONOCYTES (10*3/UL) IN BLOOD BY AUTOMATED COUNT: 0.46 X10E9/L (ref 0.05–0.8)
MONOCYTES/100 LEUKOCYTES IN BLOOD BY AUTOMATED COUNT: 8.6 % (ref 2–10)
NEUTROPHILS (10*3/UL) IN BLOOD BY AUTOMATED COUNT: 3.18 X10E9/L (ref 1.6–5.5)
NEUTROPHILS/100 LEUKOCYTES IN BLOOD BY AUTOMATED COUNT: 59.8 % (ref 40–80)
PLATELETS (10*3/UL) IN BLOOD AUTOMATED COUNT: 313 X10E9/L (ref 150–450)
POTASSIUM (MMOL/L) IN SER/PLAS: 4 MMOL/L (ref 3.5–5.3)
PROTEIN TOTAL: 6.9 G/DL (ref 6.4–8.2)
SODIUM (MMOL/L) IN SER/PLAS: 134 MMOL/L (ref 136–145)
THYROTROPIN (MIU/L) IN SER/PLAS BY DETECTION LIMIT <= 0.05 MIU/L: 2.11 MIU/L (ref 0.44–3.98)
THYROXINE (T4) FREE (NG/DL) IN SER/PLAS: 1.2 NG/DL (ref 0.61–1.12)
TRIGLYCERIDE (MG/DL) IN SER/PLAS: 112 MG/DL (ref 0–149)
UREA NITROGEN (MG/DL) IN SER/PLAS: 14 MG/DL (ref 6–23)
VLDL: 22 MG/DL (ref 0–40)

## 2023-06-02 RX ORDER — NIFEDIPINE 30 MG/1
30 TABLET, FILM COATED, EXTENDED RELEASE ORAL DAILY
Qty: 90 TABLET | Refills: 1 | Status: SHIPPED | OUTPATIENT
Start: 2023-06-02 | End: 2024-01-22 | Stop reason: SDUPTHER

## 2023-06-02 NOTE — TELEPHONE ENCOUNTER
Rx Refill Request Telephone Encounter    Name:  Katelyn Weiss  :  [id number]  Medication Name:  nifedipine 30mg    Specific Pharmacy location:  [facility]   Date of last appointment:  3/9/23  Date of next appointment:  23   Best number to reach patient:  [phone]

## 2023-06-09 ENCOUNTER — OFFICE VISIT (OUTPATIENT)
Dept: PRIMARY CARE | Facility: CLINIC | Age: 88
End: 2023-06-09
Payer: MEDICARE

## 2023-06-09 VITALS
WEIGHT: 139 LBS | DIASTOLIC BLOOD PRESSURE: 70 MMHG | OXYGEN SATURATION: 97 % | HEIGHT: 60 IN | TEMPERATURE: 96.2 F | BODY MASS INDEX: 27.29 KG/M2 | HEART RATE: 67 BPM | SYSTOLIC BLOOD PRESSURE: 120 MMHG | RESPIRATION RATE: 16 BRPM

## 2023-06-09 DIAGNOSIS — E11.9 ENCOUNTER FOR DIABETIC FOOT EXAM (MULTI): ICD-10-CM

## 2023-06-09 DIAGNOSIS — M79.671 PAIN IN BOTH FEET: ICD-10-CM

## 2023-06-09 DIAGNOSIS — N18.31 CHRONIC KIDNEY DISEASE, STAGE 3A (MULTI): ICD-10-CM

## 2023-06-09 DIAGNOSIS — M79.672 PAIN IN BOTH FEET: ICD-10-CM

## 2023-06-09 DIAGNOSIS — E03.9 ACQUIRED HYPOTHYROIDISM: ICD-10-CM

## 2023-06-09 DIAGNOSIS — N18.31 TYPE 2 DIABETES MELLITUS WITH STAGE 3A CHRONIC KIDNEY DISEASE, WITHOUT LONG-TERM CURRENT USE OF INSULIN (MULTI): Primary | ICD-10-CM

## 2023-06-09 DIAGNOSIS — E78.2 MIXED HYPERLIPIDEMIA: ICD-10-CM

## 2023-06-09 DIAGNOSIS — J44.89 COPD WITH CHRONIC BRONCHITIS (MULTI): ICD-10-CM

## 2023-06-09 DIAGNOSIS — F41.1 GENERALIZED ANXIETY DISORDER: ICD-10-CM

## 2023-06-09 DIAGNOSIS — D64.9 CHRONIC ANEMIA: ICD-10-CM

## 2023-06-09 DIAGNOSIS — R19.7 INTERMITTENT DIARRHEA: ICD-10-CM

## 2023-06-09 DIAGNOSIS — I10 ESSENTIAL HYPERTENSION: ICD-10-CM

## 2023-06-09 DIAGNOSIS — E11.22 TYPE 2 DIABETES MELLITUS WITH STAGE 3A CHRONIC KIDNEY DISEASE, WITHOUT LONG-TERM CURRENT USE OF INSULIN (MULTI): Primary | ICD-10-CM

## 2023-06-09 PROCEDURE — 3074F SYST BP LT 130 MM HG: CPT | Performed by: FAMILY MEDICINE

## 2023-06-09 PROCEDURE — 1036F TOBACCO NON-USER: CPT | Performed by: FAMILY MEDICINE

## 2023-06-09 PROCEDURE — 1159F MED LIST DOCD IN RCRD: CPT | Performed by: FAMILY MEDICINE

## 2023-06-09 PROCEDURE — 3078F DIAST BP <80 MM HG: CPT | Performed by: FAMILY MEDICINE

## 2023-06-09 PROCEDURE — 1160F RVW MEDS BY RX/DR IN RCRD: CPT | Performed by: FAMILY MEDICINE

## 2023-06-09 PROCEDURE — 99214 OFFICE O/P EST MOD 30 MIN: CPT | Performed by: FAMILY MEDICINE

## 2023-06-09 RX ORDER — ALPRAZOLAM 1 MG/1
1 TABLET ORAL 2 TIMES DAILY PRN
Qty: 60 TABLET | Refills: 1 | Status: SHIPPED | OUTPATIENT
Start: 2023-06-09 | End: 2023-09-11 | Stop reason: SDUPTHER

## 2023-06-09 ASSESSMENT — ANXIETY QUESTIONNAIRES
2. NOT BEING ABLE TO STOP OR CONTROL WORRYING: NOT AT ALL
5. BEING SO RESTLESS THAT IT IS HARD TO SIT STILL: NOT AT ALL
1. FEELING NERVOUS, ANXIOUS, OR ON EDGE: NOT AT ALL
IF YOU CHECKED OFF ANY PROBLEMS ON THIS QUESTIONNAIRE, HOW DIFFICULT HAVE THESE PROBLEMS MADE IT FOR YOU TO DO YOUR WORK, TAKE CARE OF THINGS AT HOME, OR GET ALONG WITH OTHER PEOPLE: NOT DIFFICULT AT ALL
3. WORRYING TOO MUCH ABOUT DIFFERENT THINGS: NOT AT ALL
4. TROUBLE RELAXING: NOT AT ALL
7. FEELING AFRAID AS IF SOMETHING AWFUL MIGHT HAPPEN: NOT AT ALL
GAD7 TOTAL SCORE: 0
6. BECOMING EASILY ANNOYED OR IRRITABLE: NOT AT ALL

## 2023-06-09 NOTE — PROGRESS NOTES
"Chief Complaint   Patient presents with    Follow-up     Diabetes, Hypertension, Hyperlipidemia, Anxiety, Hypothyroidism     She presents today for follow up on Diabetes Mellitus, hypertension, hyperlipidemia    Current  diabetes related symptoms include: none. Patient denies foot ulcerations, hypoglycemia , nausea, paresthesia of the feet, polydipsia, polyuria, visual disturbances, vomiting, and weight loss.  Patient denies chest pain, claudication, dyspnea, exertional chest pressure/discomfort, irregular heart beat, lower extremity edema, orthopnea, palpitations, paroxysmal nocturnal dyspnea, and syncope. Evaluation to date has included: fasting blood sugar, fasting lipid panel, hemoglobin A1C, and microalbuminuria.  Home sugars: patient does not check sugars.   Patient denies chest pain, claudication, dyspnea, exertional chest pressure/discomfort, irregular heart beat, lower extremity edema, orthopnea, palpitations, paroxysmal nocturnal dyspnea, and syncope.     She presents today to follow up on anxiety disorder. Current symptoms: racing thoughts. She denies current suicidal and homicidal ideation. She complains of the following side effects from the treatment: none.     Patient complains of diarrhea. Onset of diarrhea was 2 months ago. Diarrhea is occurring approximately  \"every 3-4 days\" . Patient describes diarrhea as watery. Diarrhea has been associated with  bloating and frequent belching . Patient denies blood in stool, fever, significant abdominal pain. Previous visits for diarrhea: none. Evaluation to date: none.     Patient complains of edema in both feet and both lower legs. The edema has been mild. Onset of symptoms was several months ago, and patient reports symptoms have stabilized since that time. The edema is present in the evening. The swelling has been aggravated by  \"at anytime\" . The swelling has been relieved by elevation of involved area. Associated factors include: nothing.     She is " requesting a referral to Podiatry.     Past Medical History:   Diagnosis Date    Acute sinusitis, unspecified 04/24/2019    Acute non-recurrent sinusitis, unspecified location    Encounter for general adult medical examination without abnormal findings 02/19/2020    Routine general medical examination at health care facility    Personal history of other endocrine, nutritional and metabolic disease     History of thyroid disorder     Patient Active Problem List    Diagnosis Date Noted    Acquired hypothyroidism 01/25/2023    Acute UTI 01/25/2023    Allergic rhinitis 01/25/2023    Appetite loss 01/25/2023    Chronic anemia 01/25/2023    Chronic kidney disease, stage 3a 01/25/2023    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[facility]) 01/25/2023    Decreased mobility 01/25/2023    Dysphagia 01/25/2023    Ear pain 01/25/2023    Esophageal spasm 01/25/2023    GERD (gastroesophageal reflux disease) 01/25/2023    Essential hypertension 01/25/2023    Generalized anxiety disorder 01/25/2023    Glaucoma 01/25/2023    Hyponatremia 01/25/2023    Mixed hyperlipidemia 01/25/2023    Neck mass 01/25/2023    Severe acute respiratory syndrome coronavirus 2 (SARS-CoV-2) detected 01/25/2023    Type 2 diabetes mellitus with stage 3a chronic kidney disease, without long-term current use of insulin (CMS/[facility]) 01/25/2023    UTI symptoms 01/25/2023    Vitamin B12 deficiency 01/25/2023    Weight loss 01/25/2023     Past Surgical History:   Procedure Laterality Date    MR CHEST ANGIO W IV CONTRAST  12/5/2014    MR CHEST ANGIO W IV CONTRAST 12/5/2014 [facility]    OTHER SURGICAL HISTORY  02/27/2019    Umbilical hernia repair     No family history on file.    Social History     Socioeconomic History    Marital status:      Spouse name: None    Number of children: None    Years of education: None    Highest education level: None   Occupational History    None   Tobacco Use    Smoking status: Never    Smokeless tobacco: Never   Vaping Use     Vaping status: None   Substance and Sexual Activity    Alcohol use: Never    Drug use: Never    Sexual activity: None   Other Topics Concern    None   Social History Narrative    None     Social Determinants of Health     Financial Resource Strain: Not on file   Food Insecurity: Not on file   Transportation Needs: Not on file   Physical Activity: Not on file   Stress: Not on file   Social Connections: Not on file   Intimate Partner Violence: Not on file   Housing Stability: Not on file     Current Outpatient Medications   Medication Sig Dispense Refill    albuterol 90 mcg/actuation inhaler Inhale. INHALE 1 TO 2 PUFFS EVERY 4 TO 6 HOURS AS NEEDED.      aspirin 81 mg chewable tablet Chew.      atorvastatin (Lipitor) 20 mg tablet Take 1 tablet (20 mg) by mouth once daily.      bimatoprost (Lumigan) 0.03 % ophthalmic solution Administer into affected eye(s).      fluticasone (Flonase) 50 mcg/actuation nasal spray Administer 2 sprays into each nostril once daily. SHAKE LIQUID      lancets 33 gauge misc       levothyroxine (Synthroid, Levoxyl) 50 mcg tablet Take 1 tablet (50 mcg) by mouth once daily.      metFORMIN XR (Glucophage-XR) 500 mg 24 hr tablet Take 1 tablet (500 mg) by mouth once daily in the evening. Take with meals. 90 tablet 1    metoprolol tartrate (Lopressor) 25 mg tablet Take 0.5 tablets (12.5 mg) by mouth. 2 times daily      NIFEdipine CC (Adalat CC) 30 mg 24 hr tablet Take 1 tablet (30 mg) by mouth once daily. 90 tablet 1    nitroglycerin (Nitrostat) 0.4 mg SL tablet Place 1 tablet (0.4 mg) under the tongue every 5 minutes if needed.      OneTouch Ultra Test strip TEST 3 TIMES DAILY.TEST 3 TIMES DAILY. 300 strip 3    pantoprazole (ProtoNix) 40 mg EC tablet Take 1 tablet (40 mg) by mouth once daily.      ALPRAZolam (Xanax) 1 mg tablet Take 1 tablet (1 mg) by mouth 2 times a day as needed for anxiety. 60 tablet 1    cyanocobalamin (Vitamin B-12) 1,000 mcg/mL injection 1 mL (1,000 mcg) every 14 (fourteen)  days. Inject       No current facility-administered medications for this visit.     Current Outpatient Medications on File Prior to Visit   Medication Sig Dispense Refill    albuterol 90 mcg/actuation inhaler Inhale. INHALE 1 TO 2 PUFFS EVERY 4 TO 6 HOURS AS NEEDED.      aspirin 81 mg chewable tablet Chew.      atorvastatin (Lipitor) 20 mg tablet Take 1 tablet (20 mg) by mouth once daily.      bimatoprost (Lumigan) 0.03 % ophthalmic solution Administer into affected eye(s).      fluticasone (Flonase) 50 mcg/actuation nasal spray Administer 2 sprays into each nostril once daily. SHAKE LIQUID      lancets 33 gauge misc       levothyroxine (Synthroid, Levoxyl) 50 mcg tablet Take 1 tablet (50 mcg) by mouth once daily.      metFORMIN XR (Glucophage-XR) 500 mg 24 hr tablet Take 1 tablet (500 mg) by mouth once daily in the evening. Take with meals. 90 tablet 1    metoprolol tartrate (Lopressor) 25 mg tablet Take 0.5 tablets (12.5 mg) by mouth. 2 times daily      NIFEdipine CC (Adalat CC) 30 mg 24 hr tablet Take 1 tablet (30 mg) by mouth once daily. 90 tablet 1    nitroglycerin (Nitrostat) 0.4 mg SL tablet Place 1 tablet (0.4 mg) under the tongue every 5 minutes if needed.      OneTouch Ultra Test strip TEST 3 TIMES DAILY.TEST 3 TIMES DAILY. 300 strip 3    pantoprazole (ProtoNix) 40 mg EC tablet Take 1 tablet (40 mg) by mouth once daily.      [DISCONTINUED] ALPRAZolam (Xanax) 1 mg tablet Take 1 tablet (1 mg) by mouth 2 times a day as needed for anxiety. 60 tablet 1    cyanocobalamin (Vitamin B-12) 1,000 mcg/mL injection 1 mL (1,000 mcg) every 14 (fourteen) days. Inject       No current facility-administered medications on file prior to visit.     Allergies   Allergen Reactions    Bee Venom Protein (Honey Bee) Anaphylaxis    Losartan Potassium Unknown    Sulfamethoxazole Unknown      Review of Systems - General ROS: negative for - fatigue, fever, malaise, night sweats, sleep disturbance or weight loss  ENT ROS: negative for  - hearing change, nasal discharge, oral lesions, sinus pain, sore throat, tinnitus or vertigo  Allergy and Immunology ROS: negative for - hives, nasal congestion or seasonal allergies  Hematological and Lymphatic ROS: negative for - bruising, fatigue, night sweats or pallor  Endocrine ROS: negative for - hot flashes, malaise/lethargy, palpitations, polydipsia/polyuria, skin changes, temperature intolerance or unexpected weight changes  Respiratory ROS: negative for - cough, hemoptysis, pleuritic pain, shortness of breath or wheezing  Cardiovascular ROS: no chest pain or dyspnea on exertion  Genito-Urinary ROS: no dysuria, trouble voiding, or hematuria  Musculoskeletal ROS: negative for - joint pain, joint stiffness, joint swelling, muscle pain or muscular weakness  Neurological ROS: negative for - dizziness, gait disturbance, headaches, impaired coordination/balance, numbness/tingling, tremors or visual changes  Dermatological ROS: negative for - dry skin, lumps, pruritus or rash    Blood pressure 120/70, pulse 67, temperature 35.7 °C (96.2 °F), temperature source Temporal, resp. rate 16, height 1.524 m (5'), weight 63 kg (139 lb), SpO2 97 %.    Physical Examination: General appearance - alert, well appearing, and in no distress  Mental status - alert, oriented to person, place, and time  Eyes - pupils equal and reactive, extraocular eye movements intact  Ears - bilateral TM's and external ear canals normal  Mouth - mucous membranes moist, pharynx normal without lesions  Neck - supple, no significant adenopathy  Lymphatics - no palpable lymphadenopathy, no hepatosplenomegaly  Chest - clear to auscultation, no wheezes, rales or rhonchi, symmetric air entry  Heart -regular rate and rhythm with 2/6 systolic normal in the left sternal border with no gallop or rub.   Abdomen - soft, nontender, nondistended, no masses or organomegaly  Neurological - alert, oriented, normal speech, no focal findings or movement disorder  noted  Musculoskeletal - no joint tenderness, deformity or swelling  Extremities: peripheral pulses normal, no pedal edema, no clubbing or cyanosis.      Orders Only on 06/02/2023   Component Date Value Ref Range Status    Hemoglobin A1C 06/02/2023 6.5 (A)  % Final    Comment:      Diagnosis of Diabetes-Adults   Non-Diabetic: < or = 5.6%   Increased risk for developing diabetes: 5.7-6.4%   Diagnostic of diabetes: > or = 6.5%  .       Monitoring of Diabetes                Age (y)     Therapeutic Goal (%)   Adults:          >18           <7.0   Pediatrics:    13-18           <7.5                   7-12           <8.0                   0- 6            7.5-8.5   American Diabetes Association. Diabetes Care 33(S1), Jan 2010.      Estimated Average Glucose 06/02/2023 140  MG/DL Final    Glucose 06/02/2023 121 (H)  74 - 99 mg/dL Final    Sodium 06/02/2023 134 (L)  136 - 145 mmol/L Final    Potassium 06/02/2023 4.0  3.5 - 5.3 mmol/L Final    Chloride 06/02/2023 96 (L)  98 - 107 mmol/L Final    Bicarbonate 06/02/2023 31  21 - 32 mmol/L Final    Anion Gap 06/02/2023 11  10 - 20 mmol/L Final    Urea Nitrogen 06/02/2023 14  6 - 23 mg/dL Final    Creatinine 06/02/2023 0.82  0.50 - 1.05 mg/dL Final    GFR Female 06/02/2023 69  >90 mL/min/1.73m2 Final    Comment:  CALCULATIONS OF ESTIMATED GFR ARE PERFORMED   USING THE 2021 CKD-EPI STUDY REFIT EQUATION   WITHOUT THE RACE VARIABLE FOR THE IDMS-TRACEABLE   CREATININE METHODS.    https://jasn.asnjournals.org/content/early/2021/09/22/ASN.5259992047      Calcium 06/02/2023 8.9  8.6 - 10.3 mg/dL Final    Albumin 06/02/2023 4.2  3.4 - 5.0 g/dL Final    Alkaline Phosphatase 06/02/2023 72  33 - 136 U/L Final    Total Protein 06/02/2023 6.9  6.4 - 8.2 g/dL Final    AST 06/02/2023 15  9 - 39 U/L Final    Total Bilirubin 06/02/2023 0.7  0.0 - 1.2 mg/dL Final    ALT (SGPT) 06/02/2023 8  7 - 45 U/L Final    Comment:  Patients treated with Sulfasalazine may generate    falsely decreased results  for ALT.      WBC 06/02/2023 5.3  4.4 - 11.3 x10E9/L Final    RBC 06/02/2023 3.86 (L)  4.00 - 5.20 x10E12/L Final    Hemoglobin 06/02/2023 11.7 (L)  12.0 - 16.0 g/dL Final    Hematocrit 06/02/2023 35.4 (L)  36.0 - 46.0 % Final    MCV 06/02/2023 92  80 - 100 fL Final    MCHC 06/02/2023 33.1  32.0 - 36.0 g/dL Final    Platelets 06/02/2023 313  150 - 450 x10E9/L Final    RDW 06/02/2023 12.2  11.5 - 14.5 % Final    Neutrophils % 06/02/2023 59.8  40.0 - 80.0 % Final    Immature Granulocytes %, Automated 06/02/2023 0.2  0.0 - 0.9 % Final    Comment:  Immature Granulocyte Count (IG) includes promyelocytes,    myelocytes and metamyelocytes but does not include bands.   Percent differential counts (%) should be interpreted in the   context of the absolute cell counts (cells/L).      Lymphocytes % 06/02/2023 26.5  13.0 - 44.0 % Final    Monocytes % 06/02/2023 8.6  2.0 - 10.0 % Final    Eosinophils % 06/02/2023 3.6  0.0 - 6.0 % Final    Basophils % 06/02/2023 1.3  0.0 - 2.0 % Final    Neutrophils Absolute 06/02/2023 3.18  1.60 - 5.50 x10E9/L Final    Lymphocytes Absolute 06/02/2023 1.41  0.80 - 3.00 x10E9/L Final    Monocytes Absolute 06/02/2023 0.46  0.05 - 0.80 x10E9/L Final    Eosinophils Absolute 06/02/2023 0.19  0.00 - 0.40 x10E9/L Final    Basophils Absolute 06/02/2023 0.07  0.00 - 0.10 x10E9/L Final    TSH 06/02/2023 2.11  0.44 - 3.98 mIU/L Final    Comment:  TSH testing is performed using different testing    methodology at [facility] than at other    system hospitals. Direct result comparisons should    only be made within the same method.      Cholesterol 06/02/2023 135  0 - 199 mg/dL Final    Comment: .      AGE      DESIRABLE   BORDERLINE HIGH   HIGH     0-19 Y     0 - 169       170 - 199     >/= 200    20-24 Y     0 - 189       190 - 224     >/= 225         >24 Y     0 - 199       200 - 239     >/= 240   **All ranges are based on fasting samples. Specific   therapeutic targets will vary based on  patient-specific   cardiac risk.  .   Pediatric guidelines reference:Pediatrics 2011, 128(S5).   Adult guidelines reference: NCEP ATPIII Guidelines,     GABRIELLE 2001, 258:2486-97  .   Venipuncture immediately after or during the    administration of Metamizole may lead to falsely   low results. Testing should be performed immediately   prior to Metamizole dosing.      HDL 06/02/2023 44.0  mg/dL Final    Comment: .      AGE      VERY LOW   LOW     NORMAL    HIGH       0-19 Y       < 35   < 40     40-45     ----    20-24 Y       ----   < 40       >45     ----      >24 Y       ----   < 40     40-60      >60  .      Cholesterol/HDL Ratio 06/02/2023 3.1   Final    Comment: REF VALUES  DESIRABLE  < 3.4  HIGH RISK  > 5.0      LDL 06/02/2023 69  0 - 99 mg/dL Final    Comment: .                           NEAR      BORD      AGE      DESIRABLE  OPTIMAL    HIGH     HIGH     VERY HIGH     0-19 Y     0 - 109     ---    110-129   >/= 130     ----    20-24 Y     0 - 119     ---    120-159   >/= 160     ----      >24 Y     0 -  99   100-129  130-159   160-189     >/=190  .      VLDL 06/02/2023 22  0 - 40 mg/dL Final    Triglycerides 06/02/2023 112  0 - 149 mg/dL Final    Comment: .      AGE      DESIRABLE   BORDERLINE HIGH   HIGH     VERY HIGH   0 D-90 D    19 - 174         ----         ----        ----  91 D- 9 Y     0 -  74        75 -  99     >/= 100      ----    10-19 Y     0 -  89        90 - 129     >/= 130      ----    20-24 Y     0 - 114       115 - 149     >/= 150      ----         >24 Y     0 - 149       150 - 199    200- 499    >/= 500  .   Venipuncture immediately after or during the    administration of Metamizole may lead to falsely   low results. Testing should be performed immediately   prior to Metamizole dosing.      Free T4 06/02/2023 1.20 (H)  0.61 - 1.12 ng/dL Final    Comment:  Thyroxine Free testing is performed using different testing    methodology at [facility] than at other    [facility].  Direct result comparisons should    only be made within the same method.  .   Biotin can cause falsely elevated free T4 results. Patients   taking a Biotin dose of up to 10 mg/day should refrain from   taking Biotin for 24 hours before sample collection. Patient   taking a Biotin dose of >10 mg/day should consult with their   physician or the laboratory before the blood draw.         Problem List Items Addressed This Visit       Acquired hypothyroidism    Relevant Orders    Thyroid Stimulating Hormone    Thyroxine, Free    Chronic anemia    Chronic kidney disease, stage 3a    Current Assessment & Plan     Chronic Condition Documentation : Stable based on symptoms and exam.  Continue established treatment plan and follow-up at least yearly.             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  Continue established treatment plan and follow-up at least yearly.         Essential hypertension    Relevant Orders    Follow Up In [facility] - PCP    CBC and Auto Differential    Comprehensive Metabolic Panel    Hemoglobin A1C    Lipid Panel    Generalized anxiety disorder    Current Assessment & Plan       Handouts describing disease, natural history, and treatment were given to the patient.  Reviewed concept of anxiety as biochemical imbalance of neurotransmitters and rationale for treatment.  Instructed patient to contact office or on-call physician promptly should condition worsen or any new symptoms appear and provided on-call telephone numbers. IF THE PATIENT HAS ANY SUICIDAL OR HOMICIDAL IDEATIONS, CALL THE OFFICE, DISCUSS WITH A SUPPORT MEMBER, OR GO TO THE ER IMMEDIATELY. Patient was agreeable with this plan.'         Relevant Medications    ALPRAZolam (Xanax) 1 mg tablet    Mixed hyperlipidemia    Relevant Orders    Follow Up In [facility] - PCP    CBC and Auto Differential    Comprehensive  Metabolic Panel    Hemoglobin A1C    Lipid Panel    Type 2 diabetes mellitus with stage 3a chronic kidney disease, without long-term current use of insulin (CMS/[facility]) - Primary    Current Assessment & Plan     Diabetes Mellitus type II, under good control.  1. Rx changes: None  2. Education: Reviewed ‘ABCs’ of diabetes management (respective goals in parentheses):  A1C (<7), blood pressure (<130/80), and cholesterol (LDL <100).  3. Compliance at present is estimated to be good. Efforts to improve compliance (if necessary) will be directed at dietary modifications: and increased exercise.  4. Follow up: 3 months           Relevant Orders    Follow Up In [facility] - PCP    CBC and Auto Differential    Comprehensive Metabolic Panel    Hemoglobin A1C    Lipid Panel     Other Visit Diagnoses       Encounter for diabetic foot exam (CMS/[facility])        Relevant Orders    Referral to Podiatry    Pain in both feet        Relevant Orders    Referral to Podiatry    Intermittent diarrhea        Relevant Orders    Calprotectin, Fecal    Occult Blood, Stool    Ova/Para + Giardia/Cryptosporidium Antigen    Stool Pathogen Panel, PCR    C. difficile, PCR          OARRS:  Darius Quezada MD on 6/9/2023 11:58 AM  I have personally reviewed the OARRS report for Katelyn Weiss. I have considered the risks of abuse, dependence, addiction and diversion    Is the patient prescribed a combination of a benzodiazepine and opioid?  No    Last Urine Drug Screen Done 9/7/2022  Recent Results (from the past 51291 hour(s))   OPIATE/OPIOID/BENZO PRESCRIPTION COMPLIANCE    Collection Time: 09/09/21 12:18 PM   Result Value Ref Range    DRUG SCREEN COMMENT URINE SEE BELOW     Creatine, Urine 119.2 mg/dL    Amphetamine Screen, Urine PRESUMPTIVE NEGATIVE NEGATIVE    Barbiturate Screen, Urine PRESUMPTIVE NEGATIVE NEGATIVE    Cannabinoid Screen, Urine PRESUMPTIVE NEGATIVE NEGATIVE    Cocaine Screen, Urine PRESUMPTIVE NEGATIVE NEGATIVE    PCP  Screen, Urine PRESUMPTIVE NEGATIVE NEGATIVE    7-Aminoclonazepam <25 Cutoff <25 ng/mL    Alpha-Hydroxyalprazolam 620 (A) Cutoff <25 ng/mL    Alpha-Hydroxymidazolam <25 Cutoff <25 ng/mL    Alprazolam 263 (A) Cutoff <25 ng/mL    Chlordiazepoxide <25 Cutoff <25 ng/mL    Clonazepam <25 Cutoff <25 ng/mL    Diazepam <25 Cutoff <25 ng/mL    Lorazepam <25 Cutoff <25 ng/mL    Midazolam <25 Cutoff <25 ng/mL    Nordiazepam <25 Cutoff <25 ng/mL    Oxazepam <25 Cutoff <25 ng/mL    Temazepam <25 Cutoff <25 ng/mL    Zolpidem <25 Cutoff <25 ng/mL    Zolpidem Metabolite (ZCA) <25 Cutoff <25 ng/mL    6-Acetylmorphine <25 Cutoff <25 ng/mL    Codeine <50 Cutoff <50 ng/mL    Hydrocodone <25 Cutoff <25 ng/mL    Hydromorphone <25 Cutoff <25 ng/mL    Morphine Urine <50 Cutoff <50 ng/mL    Norhydrocodone <25 Cutoff <25 ng/mL    Noroxycodone <25 Cutoff <25 ng/mL    Oxycodone <25 Cutoff <25 ng/mL    Oxymorphone <25 Cutoff <25 ng/mL    Tramadol <50 Cutoff <50 ng/mL    O-Desmethyltramadol <50 Cutoff <50 ng/mL    Fentanyl <2.5 Cutoff<2.5 ng/mL    Norfentanyl <2.5 Cutoff<2.5 ng/mL    METHADONE CONFIRMATION,URINE <25 Cutoff <25 ng/mL    EDDP <25 Cutoff <25 ng/mL     Controlled Substance Agreement:  Date of the Last Agreement: 3/9/2023  Reviewed Controlled Substance Agreement including but not limited to the benefits, risks, and alternatives to treatment with a Controlled Substance medication(s).    Benzodiazepines:  What is the patient's goal of therapy? Able to function without being unduly anxious and carry out day-to-day activities without tension and panic attacks    Is this being achieved with current treatment? yes    JOSE-7:  Over the last 2 weeks, how often have you been bothered by any of the following problems?  Feeling nervous, anxious, or on edge: 0  Not being able to stop or control worryin  Worrying too much about different things: 0  Trouble relaxin  Being so restless that it is hard to sit still: 0  Becoming easily annoyed  or irritable: 0  Feeling afraid as if something awful might happen: 0  JOSE-7 Total Score: 0        Activities of Daily Living:   Is your overall impression that this patient is benefiting (symptom reduction outweighs side effects) from benzodiazepine therapy? Yes     1. Physical Functioning: Better  2. Family Relationship: Better  3. Social Relationship: Better  4. Mood: Better  5. Sleep Patterns: Better  6. Overall Function: Better    Patient to keep food diary and log of blood sugars and bring to next office visit. Patient encouraged to increase activity level gradually and encouraged weight loss. Strongly encouraged to maintain blood sugar at levels as close to normal as possible thus preventing or delaying complications (regular medical care is important for this). Encouraged to follow a balanced meal plan. Eat consistent and moderate amounts of food at regular times. Nuts and peanut butter are a good choice for a snack. Advised patient to not skip meals. Recommended that patient: Eat plenty of vegetables and fiber, limited amounts of fat, moderate amounts of protein and low-fat dairy products, and carefully limit foods containing high concentrated sugar. Keep a record of your food intake. We will review at the next visit. Educated patient about exercise. Exercise lowers blood glucose levels. Regular exercise (eg, 30-60 minutes of walking every day) can help keep blood glucose in better control. Exercise increases insulin sensitivity and helps an individual lose weight. It can also help overall health.     Scribe Attestation  By signing my name below, Eze STRICKLAND Scribe   attest that this documentation has been prepared under the direction and in the presence of Darius Quezada MD.

## 2023-06-09 NOTE — ASSESSMENT & PLAN NOTE
Handouts describing disease, natural history, and treatment were given to the patient.  Reviewed concept of anxiety as biochemical imbalance of neurotransmitters and rationale for treatment.  Instructed patient to contact office or on-call physician promptly should condition worsen or any new symptoms appear and provided on-call telephone numbers. IF THE PATIENT HAS ANY SUICIDAL OR HOMICIDAL IDEATIONS, CALL THE OFFICE, DISCUSS WITH A SUPPORT MEMBER, OR GO TO THE ER IMMEDIATELY. Patient was agreeable with this plan.'

## 2023-06-26 ENCOUNTER — HOSPITAL ENCOUNTER (OUTPATIENT)
Dept: DATA CONVERSION | Facility: HOSPITAL | Age: 88
End: 2023-07-08
Attending: STUDENT IN AN ORGANIZED HEALTH CARE EDUCATION/TRAINING PROGRAM
Payer: MEDICARE

## 2023-06-27 LAB
ANION GAP IN SER/PLAS: 12 MMOL/L (ref 10–20)
APPEARANCE, URINE: CLEAR
BILIRUBIN, URINE: NEGATIVE
BLOOD, URINE: NEGATIVE
CALCIUM (MG/DL) IN SER/PLAS: 8.4 MG/DL (ref 8.6–10.3)
CARBON DIOXIDE, TOTAL (MMOL/L) IN SER/PLAS: 26 MMOL/L (ref 21–32)
CHLORIDE (MMOL/L) IN SER/PLAS: 93 MMOL/L (ref 98–107)
COLOR, URINE: YELLOW
CREATININE (MG/DL) IN SER/PLAS: 0.66 MG/DL (ref 0.5–1.05)
ERYTHROCYTE DISTRIBUTION WIDTH (RATIO) BY AUTOMATED COUNT: 13.7 % (ref 11.5–14.5)
ERYTHROCYTE MEAN CORPUSCULAR HEMOGLOBIN CONCENTRATION (G/DL) BY AUTOMATED: 33.7 G/DL (ref 32–36)
ERYTHROCYTE MEAN CORPUSCULAR VOLUME (FL) BY AUTOMATED COUNT: 89 FL (ref 80–100)
ERYTHROCYTES (10*6/UL) IN BLOOD BY AUTOMATED COUNT: 3.14 X10E12/L (ref 4–5.2)
GFR FEMALE: 84 ML/MIN/1.73M2
GLUCOSE (MG/DL) IN SER/PLAS: 97 MG/DL (ref 74–99)
GLUCOSE, URINE: NEGATIVE MG/DL
HEMATOCRIT (%) IN BLOOD BY AUTOMATED COUNT: 27.9 % (ref 36–46)
HEMOGLOBIN (G/DL) IN BLOOD: 9.4 G/DL (ref 12–16)
KETONES, URINE: ABNORMAL MG/DL
LEUKOCYTE ESTERASE, URINE: NEGATIVE
LEUKOCYTES (10*3/UL) IN BLOOD BY AUTOMATED COUNT: 7.5 X10E9/L (ref 4.4–11.3)
NITRITE, URINE: NEGATIVE
PH, URINE: 7 (ref 5–8)
PLATELETS (10*3/UL) IN BLOOD AUTOMATED COUNT: 284 X10E9/L (ref 150–450)
POTASSIUM (MMOL/L) IN SER/PLAS: 4.3 MMOL/L (ref 3.5–5.3)
PROTEIN, URINE: NEGATIVE MG/DL
SODIUM (MMOL/L) IN SER/PLAS: 127 MMOL/L (ref 136–145)
SPECIFIC GRAVITY, URINE: 1.02 (ref 1–1.03)
UREA NITROGEN (MG/DL) IN SER/PLAS: 13 MG/DL (ref 6–23)
UROBILINOGEN, URINE: <2 MG/DL (ref 0–1.9)

## 2023-07-03 LAB
ANION GAP IN SER/PLAS: 12 MMOL/L (ref 10–20)
CALCIUM (MG/DL) IN SER/PLAS: 8.6 MG/DL (ref 8.6–10.3)
CARBON DIOXIDE, TOTAL (MMOL/L) IN SER/PLAS: 26 MMOL/L (ref 21–32)
CHLORIDE (MMOL/L) IN SER/PLAS: 94 MMOL/L (ref 98–107)
CREATININE (MG/DL) IN SER/PLAS: 0.58 MG/DL (ref 0.5–1.05)
ERYTHROCYTE DISTRIBUTION WIDTH (RATIO) BY AUTOMATED COUNT: 13.2 % (ref 11.5–14.5)
ERYTHROCYTE MEAN CORPUSCULAR HEMOGLOBIN CONCENTRATION (G/DL) BY AUTOMATED: 33 G/DL (ref 32–36)
ERYTHROCYTE MEAN CORPUSCULAR VOLUME (FL) BY AUTOMATED COUNT: 91 FL (ref 80–100)
ERYTHROCYTES (10*6/UL) IN BLOOD BY AUTOMATED COUNT: 3.04 X10E12/L (ref 4–5.2)
GFR FEMALE: 87 ML/MIN/1.73M2
GLUCOSE (MG/DL) IN SER/PLAS: 121 MG/DL (ref 74–99)
HEMATOCRIT (%) IN BLOOD BY AUTOMATED COUNT: 27.6 % (ref 36–46)
HEMOGLOBIN (G/DL) IN BLOOD: 9.1 G/DL (ref 12–16)
LEUKOCYTES (10*3/UL) IN BLOOD BY AUTOMATED COUNT: 6.6 X10E9/L (ref 4.4–11.3)
PLATELETS (10*3/UL) IN BLOOD AUTOMATED COUNT: 510 X10E9/L (ref 150–450)
POTASSIUM (MMOL/L) IN SER/PLAS: 4 MMOL/L (ref 3.5–5.3)
SODIUM (MMOL/L) IN SER/PLAS: 128 MMOL/L (ref 136–145)
UREA NITROGEN (MG/DL) IN SER/PLAS: 14 MG/DL (ref 6–23)

## 2023-07-10 ENCOUNTER — TELEPHONE (OUTPATIENT)
Dept: PRIMARY CARE | Facility: CLINIC | Age: 88
End: 2023-07-10
Payer: MEDICARE

## 2023-07-10 NOTE — TELEPHONE ENCOUNTER
Verbal ok given to Reny for OT and PT. She states these were ordered by the hospital already. Pt was doing well at her visit today. Some swelling of the left leg, no drainage from the site per the pt. She will keep her appt with specialist and Dr. Quezada.

## 2023-07-10 NOTE — TELEPHONE ENCOUNTER
Reny from [facility] called in regarding the patient. She stated the had the first nursing visit and stated nursing is not needing but they are needing to evaluate for OT and OT. Reny stated the patients bp was 146/66 and her oxygen was 96. Patient is scheduled to see her orthopedic doctor tomorrow 7/11 to have her staples removed and s schedule to see Dr. Quezada on Wednesday 7/12  Please Advise

## 2023-07-11 ENCOUNTER — TELEPHONE (OUTPATIENT)
Dept: PRIMARY CARE | Facility: CLINIC | Age: 88
End: 2023-07-11
Payer: MEDICARE

## 2023-07-11 ENCOUNTER — TELEPHONE (OUTPATIENT)
Dept: PRIMARY CARE | Facility: CLINIC | Age: 88
End: 2023-07-11

## 2023-07-11 NOTE — TELEPHONE ENCOUNTER
Kimberly with [facility] called to inform [facility] they completed physical therapy today at pts home. Plan of care is 2x week for 3 weeks then 1x week for 4 weeks. Due to surgery. Just a Fyi.

## 2023-07-11 NOTE — TELEPHONE ENCOUNTER
Per Dr. Damien phillip for verbal    Left a voicemail for Marilyn to call the office back as her voicemail did not state it was confidential

## 2023-07-11 NOTE — TELEPHONE ENCOUNTER
Marilyn Urias is a speech therapist with [facility] and was calling on behalf of simba Weiss and is requesting a verbal order for a late speech eval and can be contacted at  [phone]. Please advise.

## 2023-07-12 ENCOUNTER — OFFICE VISIT (OUTPATIENT)
Dept: PRIMARY CARE | Facility: CLINIC | Age: 88
End: 2023-07-12
Payer: MEDICARE

## 2023-07-12 VITALS
DIASTOLIC BLOOD PRESSURE: 58 MMHG | BODY MASS INDEX: 27.56 KG/M2 | WEIGHT: 140.4 LBS | SYSTOLIC BLOOD PRESSURE: 106 MMHG | OXYGEN SATURATION: 97 % | RESPIRATION RATE: 16 BRPM | HEIGHT: 60 IN | TEMPERATURE: 97.6 F | HEART RATE: 73 BPM

## 2023-07-12 DIAGNOSIS — E53.8 VITAMIN B12 DEFICIENCY: ICD-10-CM

## 2023-07-12 DIAGNOSIS — K21.9 GASTROESOPHAGEAL REFLUX DISEASE WITHOUT ESOPHAGITIS: ICD-10-CM

## 2023-07-12 DIAGNOSIS — E11.22 TYPE 2 DIABETES MELLITUS WITH STAGE 3A CHRONIC KIDNEY DISEASE, WITHOUT LONG-TERM CURRENT USE OF INSULIN (MULTI): ICD-10-CM

## 2023-07-12 DIAGNOSIS — S72.142S CLOSED DISPLACED INTERTROCHANTERIC FRACTURE OF LEFT FEMUR, SEQUELA: ICD-10-CM

## 2023-07-12 DIAGNOSIS — N18.31 TYPE 2 DIABETES MELLITUS WITH STAGE 3A CHRONIC KIDNEY DISEASE, WITHOUT LONG-TERM CURRENT USE OF INSULIN (MULTI): ICD-10-CM

## 2023-07-12 DIAGNOSIS — I10 ESSENTIAL HYPERTENSION: ICD-10-CM

## 2023-07-12 DIAGNOSIS — J44.89 COPD WITH CHRONIC BRONCHITIS (MULTI): ICD-10-CM

## 2023-07-12 DIAGNOSIS — Z09 HOSPITAL DISCHARGE FOLLOW-UP: Primary | ICD-10-CM

## 2023-07-12 DIAGNOSIS — N18.31 CHRONIC KIDNEY DISEASE, STAGE 3A (MULTI): ICD-10-CM

## 2023-07-12 DIAGNOSIS — F41.1 GENERALIZED ANXIETY DISORDER: ICD-10-CM

## 2023-07-12 PROBLEM — S72.142A CLOSED DISPLACED INTERTROCHANTERIC FRACTURE OF LEFT FEMUR (MULTI): Status: ACTIVE | Noted: 2023-07-12

## 2023-07-12 PROCEDURE — 1160F RVW MEDS BY RX/DR IN RCRD: CPT | Performed by: FAMILY MEDICINE

## 2023-07-12 PROCEDURE — 3074F SYST BP LT 130 MM HG: CPT | Performed by: FAMILY MEDICINE

## 2023-07-12 PROCEDURE — 1036F TOBACCO NON-USER: CPT | Performed by: FAMILY MEDICINE

## 2023-07-12 PROCEDURE — 99214 OFFICE O/P EST MOD 30 MIN: CPT | Performed by: FAMILY MEDICINE

## 2023-07-12 PROCEDURE — 1159F MED LIST DOCD IN RCRD: CPT | Performed by: FAMILY MEDICINE

## 2023-07-12 PROCEDURE — 1125F AMNT PAIN NOTED PAIN PRSNT: CPT | Performed by: FAMILY MEDICINE

## 2023-07-12 PROCEDURE — 3078F DIAST BP <80 MM HG: CPT | Performed by: FAMILY MEDICINE

## 2023-07-12 RX ORDER — TIMOLOL MALEATE 5 MG/ML
1 SOLUTION/ DROPS OPHTHALMIC DAILY
COMMUNITY
Start: 2023-03-05

## 2023-07-12 RX ORDER — TAMSULOSIN HYDROCHLORIDE 0.4 MG/1
0.4 CAPSULE ORAL DAILY
COMMUNITY

## 2023-07-12 RX ORDER — DOCUSATE SODIUM 100 MG/1
100 CAPSULE, LIQUID FILLED ORAL 2 TIMES DAILY
COMMUNITY

## 2023-07-12 RX ORDER — LANOLIN ALCOHOL/MO/W.PET/CERES
1000 CREAM (GRAM) TOPICAL DAILY
Qty: 30 TABLET | Refills: 3 | Status: SHIPPED | OUTPATIENT
Start: 2023-07-12

## 2023-07-12 RX ORDER — ENOXAPARIN SODIUM 100 MG/ML
40 INJECTION SUBCUTANEOUS DAILY
COMMUNITY
Start: 2023-07-08 | End: 2023-09-11 | Stop reason: ALTCHOICE

## 2023-07-12 ASSESSMENT — ANXIETY QUESTIONNAIRES
3. WORRYING TOO MUCH ABOUT DIFFERENT THINGS: NOT AT ALL
GAD7 TOTAL SCORE: 0
IF YOU CHECKED OFF ANY PROBLEMS ON THIS QUESTIONNAIRE, HOW DIFFICULT HAVE THESE PROBLEMS MADE IT FOR YOU TO DO YOUR WORK, TAKE CARE OF THINGS AT HOME, OR GET ALONG WITH OTHER PEOPLE: NOT DIFFICULT AT ALL
2. NOT BEING ABLE TO STOP OR CONTROL WORRYING: NOT AT ALL
6. BECOMING EASILY ANNOYED OR IRRITABLE: NOT AT ALL
1. FEELING NERVOUS, ANXIOUS, OR ON EDGE: NOT AT ALL
4. TROUBLE RELAXING: NOT AT ALL
5. BEING SO RESTLESS THAT IT IS HARD TO SIT STILL: NOT AT ALL
7. FEELING AFRAID AS IF SOMETHING AWFUL MIGHT HAPPEN: NOT AT ALL

## 2023-07-12 ASSESSMENT — PATIENT HEALTH QUESTIONNAIRE - PHQ9
SUM OF ALL RESPONSES TO PHQ9 QUESTIONS 1 AND 2: 0
1. LITTLE INTEREST OR PLEASURE IN DOING THINGS: NOT AT ALL
2. FEELING DOWN, DEPRESSED OR HOPELESS: NOT AT ALL

## 2023-07-12 NOTE — PROGRESS NOTES
"Chief Complaint   Patient presents with    Hospital Follow-up     6/22- 6/26/2023 left hip fracture   status post Left TFN     Katelyn Weiss is a 88 y.o. female presenting today for follow-up after being discharged from the hospital 2 weeks ago. The main problem requiring admission was left hip fracture. The discharge summary and/or Transitional Care Management documentation was reviewed. Medication reconciliation was performed as indicated via the \"Vincenzo as Reviewed\" timestamp.     Patient here for follow up of a left hip fracture. The injury occurred on 6/22/2023. She reports no problems with the cast or injury, and no problems with swelling, numbness, or tingling in her femur.    Past Medical History:   Diagnosis Date    Acute sinusitis, unspecified 04/24/2019    Acute non-recurrent sinusitis, unspecified location    Encounter for general adult medical examination without abnormal findings 02/19/2020    Routine general medical examination at health care facility    Personal history of other endocrine, nutritional and metabolic disease     History of thyroid disorder     Patient Active Problem List    Diagnosis Date Noted    Closed displaced intertrochanteric fracture of left femur (CMS/[facility]) 07/12/2023    Acquired hypothyroidism 01/25/2023    Acute UTI 01/25/2023    Allergic rhinitis 01/25/2023    Appetite loss 01/25/2023    Chronic anemia 01/25/2023    Chronic kidney disease, stage 3a 01/25/2023    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[facility]) 01/25/2023    Decreased mobility 01/25/2023    Dysphagia 01/25/2023    Ear pain 01/25/2023    Esophageal spasm 01/25/2023    GERD (gastroesophageal reflux disease) 01/25/2023    Essential hypertension 01/25/2023    Generalized anxiety disorder 01/25/2023    Glaucoma 01/25/2023    Hyponatremia 01/25/2023    Mixed hyperlipidemia 01/25/2023    Neck mass 01/25/2023    Severe acute respiratory syndrome coronavirus 2 (SARS-CoV-2) detected 01/25/2023    Type 2 diabetes mellitus with " stage 3a chronic kidney disease, without long-term current use of insulin (CMS/[facility]) 01/25/2023    UTI symptoms 01/25/2023    Vitamin B12 deficiency 01/25/2023    Weight loss 01/25/2023     Past Surgical History:   Procedure Laterality Date    MR CHEST ANGIO W IV CONTRAST  12/5/2014    MR CHEST ANGIO W IV CONTRAST 12/5/2014 [facility]    OTHER SURGICAL HISTORY  02/27/2019    Umbilical hernia repair       Social History     Socioeconomic History    Marital status:      Spouse name: None    Number of children: None    Years of education: None    Highest education level: None   Occupational History    None   Tobacco Use    Smoking status: Never    Smokeless tobacco: Never   Vaping Use    Vaping Use: Never used   Substance and Sexual Activity    Alcohol use: Never    Drug use: Never    Sexual activity: None   Other Topics Concern    None   Social History Narrative    None     Social Determinants of Health     Financial Resource Strain: Not on file   Food Insecurity: Not on file   Transportation Needs: Not on file   Physical Activity: Not on file   Stress: Not on file   Social Connections: Not on file   Intimate Partner Violence: Not on file   Housing Stability: Not on file     Current Outpatient Medications   Medication Sig Dispense Refill    albuterol 90 mcg/actuation inhaler Inhale. INHALE 1 TO 2 PUFFS EVERY 4 TO 6 HOURS AS NEEDED.      ALPRAZolam (Xanax) 1 mg tablet Take 1 tablet (1 mg) by mouth 2 times a day as needed for anxiety. 60 tablet 1    aspirin 81 mg chewable tablet Chew.      atorvastatin (Lipitor) 20 mg tablet Take 1 tablet (20 mg) by mouth once daily.      bimatoprost (Lumigan) 0.03 % ophthalmic solution Administer into affected eye(s).      docusate sodium (Colace) 100 mg capsule Take 1 capsule (100 mg) by mouth 2 times a day.      enoxaparin (Lovenox) 40 mg/0.4 mL syringe Inject 0.4 mL (40 mg) under the skin once daily.      fluticasone (Flonase) 50 mcg/actuation nasal spray Administer  2 sprays into each nostril once daily. SHAKE LIQUID      lancets 33 gauge misc       levothyroxine (Synthroid, Levoxyl) 50 mcg tablet Take 1 tablet (50 mcg) by mouth once daily.      metFORMIN XR (Glucophage-XR) 500 mg 24 hr tablet Take 1 tablet (500 mg) by mouth once daily in the evening. Take with meals. 90 tablet 1    metoprolol tartrate (Lopressor) 25 mg tablet Take 0.5 tablets (12.5 mg) by mouth. 2 times daily      NIFEdipine CC (Adalat CC) 30 mg 24 hr tablet Take 1 tablet (30 mg) by mouth once daily. 90 tablet 1    nitroglycerin (Nitrostat) 0.4 mg SL tablet Place 1 tablet (0.4 mg) under the tongue every 5 minutes if needed.      OneTouch Ultra Test strip TEST 3 TIMES DAILY.TEST 3 TIMES DAILY. 300 strip 3    pantoprazole (ProtoNix) 40 mg EC tablet Take 1 tablet (40 mg) by mouth once daily.      tamsulosin (Flomax) 0.4 mg 24 hr capsule Take 1 capsule (0.4 mg) by mouth once daily.      timolol (Timoptic) 0.5 % ophthalmic solution Administer 1 drop into both eyes once daily.      cyanocobalamin (Vitamin B-12) 1,000 mcg tablet Take 1 tablet (1,000 mcg) by mouth once daily. 30 tablet 3    cyanocobalamin (Vitamin B-12) 1,000 mcg/mL injection 1 mL (1,000 mcg) every 14 (fourteen) days. Inject       No current facility-administered medications for this visit.     Current Outpatient Medications on File Prior to Visit   Medication Sig Dispense Refill    albuterol 90 mcg/actuation inhaler Inhale. INHALE 1 TO 2 PUFFS EVERY 4 TO 6 HOURS AS NEEDED.      ALPRAZolam (Xanax) 1 mg tablet Take 1 tablet (1 mg) by mouth 2 times a day as needed for anxiety. 60 tablet 1    aspirin 81 mg chewable tablet Chew.      atorvastatin (Lipitor) 20 mg tablet Take 1 tablet (20 mg) by mouth once daily.      bimatoprost (Lumigan) 0.03 % ophthalmic solution Administer into affected eye(s).      docusate sodium (Colace) 100 mg capsule Take 1 capsule (100 mg) by mouth 2 times a day.      enoxaparin (Lovenox) 40 mg/0.4 mL syringe Inject 0.4 mL (40 mg)  under the skin once daily.      fluticasone (Flonase) 50 mcg/actuation nasal spray Administer 2 sprays into each nostril once daily. SHAKE LIQUID      lancets 33 gauge misc       levothyroxine (Synthroid, Levoxyl) 50 mcg tablet Take 1 tablet (50 mcg) by mouth once daily.      metFORMIN XR (Glucophage-XR) 500 mg 24 hr tablet Take 1 tablet (500 mg) by mouth once daily in the evening. Take with meals. 90 tablet 1    metoprolol tartrate (Lopressor) 25 mg tablet Take 0.5 tablets (12.5 mg) by mouth. 2 times daily      NIFEdipine CC (Adalat CC) 30 mg 24 hr tablet Take 1 tablet (30 mg) by mouth once daily. 90 tablet 1    nitroglycerin (Nitrostat) 0.4 mg SL tablet Place 1 tablet (0.4 mg) under the tongue every 5 minutes if needed.      OneTouch Ultra Test strip TEST 3 TIMES DAILY.TEST 3 TIMES DAILY. 300 strip 3    pantoprazole (ProtoNix) 40 mg EC tablet Take 1 tablet (40 mg) by mouth once daily.      tamsulosin (Flomax) 0.4 mg 24 hr capsule Take 1 capsule (0.4 mg) by mouth once daily.      timolol (Timoptic) 0.5 % ophthalmic solution Administer 1 drop into both eyes once daily.      cyanocobalamin (Vitamin B-12) 1,000 mcg/mL injection 1 mL (1,000 mcg) every 14 (fourteen) days. Inject       No current facility-administered medications on file prior to visit.     Allergies   Allergen Reactions    Bee Venom Protein (Honey Bee) Anaphylaxis    Losartan Potassium Unknown    Sulfa (Sulfonamide Antibiotics) Unknown    Sulfamethoxazole Unknown       Review of Systems - General ROS: negative for - fatigue, fever, malaise, night sweats or weight loss  ENT ROS: negative for - hearing change, nasal discharge, oral lesions, sinus pain, sore throat, tinnitus or vertigo  Allergy and Immunology ROS: negative for - hives, nasal congestion or seasonal allergies  Respiratory ROS: negative for - cough, hemoptysis, pleuritic pain, shortness of breath or wheezing  Cardiovascular ROS: no chest pain or dyspnea on exertion, palpitations, PND or  orthopnea.  No syncope.  Gastrointestinal ROS: no abdominal pain, change in bowel habits, or black or bloody stools  Genito-Urinary ROS: no dysuria, trouble voiding, or hematuria  Dermatological ROS: negative for - dry skin, lumps, pruritus or rash  Neurological ROS: negative for - dizziness, gait disturbance, headaches, impaired coordination/balance, numbness/tingling, tremors or visual changes  Psychological ROS: negative for - anxiety, concentration difficulties, depression, memory difficulties or sleep disturbances  Endocrine ROS: negative for - hot flashes, malaise/lethargy, palpitations, polydipsia/polyuria, skin changes, temperature intolerance   Hematological and Lymphatic ROS: negative for - bruising, night sweats or pallor    Blood pressure 106/58, pulse 73, temperature 36.4 °C (97.6 °F), resp. rate 16, height 1.524 m (5'), weight 63.7 kg (140 lb 6.4 oz), SpO2 97 %.    Physical Examination: General appearance - alert, well appearing, and in no distress  HEENT EOMI, PEERLA, normal eyelids.  Oropharynx no erythema or exudate.  Normal tonsils.    Ears -external auditory canal normal bilaterally.  Tympanic membranes normal bilaterally.  Mouth - mucous membranes moist, pharynx normal without lesions  Neck - supple, trachea central no thyromegaly.  No significant adenopathy  Chest -good air entry bilaterally, no rhonchi rales and no wheezes.  Heart -normal S1 and S2, regular rate and rhythm with no murmurs gallop or rub.  Abdomen -nondistended, soft, nontender with no guarding, no palpable mass and no CVA tenderness.  Bowel sounds normal.  No hernia.  Neurological - alert, oriented, cranial nerves II through XII normal.  Reflexes 2+ bilaterally.  No focal deficit.  Extremities: peripheral pulses normal, no clubbing or cyanosis.    Orders Only on 06/26/2023   Component Date Value Ref Range Status    Color, Urine 06/27/2023 YELLOW  STRAW,YELLOW Final    Appearance, Urine 06/27/2023 CLEAR  CLEAR Final    Specific  Gravity, Urine 06/27/2023 1.017  1.005 - 1.035 Final    pH, Urine 06/27/2023 7.0  5.0 - 8.0 Final    Protein, Urine 06/27/2023 NEGATIVE  NEGATIVE mg/dL Final    Glucose, Urine 06/27/2023 NEGATIVE  NEGATIVE mg/dL Final    Blood, Urine 06/27/2023 NEGATIVE  NEGATIVE Final    Ketones, Urine 06/27/2023 5 (TRACE) (A)  NEGATIVE mg/dL Final    Bilirubin, Urine 06/27/2023 NEGATIVE  NEGATIVE Final    Urobilinogen, Urine 06/27/2023 <2.0  0.0 - 1.9 mg/dL Final    Nitrite, Urine 06/27/2023 NEGATIVE  NEGATIVE Final    Leukocyte Esterase, Urine 06/27/2023 NEGATIVE  NEGATIVE Final       Problem List Items Addressed This Visit       Chronic kidney disease, stage 3a    Current Assessment & Plan     Chronic Condition Documentation : Stable based on symptoms and exam.  Continue established treatment plan and follow-up at least yearly.           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  Continue established treatment plan and follow-up at least yearly.           GERD (gastroesophageal reflux disease)    Essential hypertension    Generalized anxiety disorder    Type 2 diabetes mellitus with stage 3a chronic kidney disease, without long-term current use of insulin (CMS/[facility])    Current Assessment & Plan     Chronic Condition Documentation : Stable based on symptoms and exam.  Continue established treatment plan and follow-up at least yearly.           Vitamin B12 deficiency    Relevant Medications    cyanocobalamin (Vitamin B-12) 1,000 mcg tablet    Closed displaced intertrochanteric fracture of left femur (CMS/[facility])    Current Assessment & Plan     Improving based on symptoms and exam.  Continue established treatment plan          Other Visit Diagnoses       Hospital discharge follow-up    -  Primary          Scribe Attestation  By signing my name below, I, Meghana Ricketts   attest that this documentation has been prepared under the  direction and in the presence of Darius Quezada MD.

## 2023-07-21 ENCOUNTER — TELEPHONE (OUTPATIENT)
Dept: PRIMARY CARE | Facility: CLINIC | Age: 88
End: 2023-07-21
Payer: MEDICARE

## 2023-07-21 NOTE — TELEPHONE ENCOUNTER
"Patient called stating \" I do not have an appetite and im just wasting away\". She is wanting to know if there is anything Dr. Quezada can prescribe to her to help her appetite.   Please advise   "
Patients daughter in law is aware.  
Per Dr. Quezada there is nothing that would be safe for him to prescribe but patient should consume Ensure or Boost type drinks 3 times daily    Left voicemail for patient to call the office back with business hours.    
Adequate: hears normal conversation without difficulty

## 2023-07-21 NOTE — TELEPHONE ENCOUNTER
Marilyn from [facility] called in regarding the patient. She is requesting  verbals for speech therapy. She stated the previous verbals were for a late evaluation for speech and swallowing  Please Advise at [phone]

## 2023-09-08 ENCOUNTER — LAB (OUTPATIENT)
Dept: LAB | Facility: LAB | Age: 88
End: 2023-09-08
Payer: MEDICARE

## 2023-09-08 DIAGNOSIS — E78.2 MIXED HYPERLIPIDEMIA: ICD-10-CM

## 2023-09-08 DIAGNOSIS — E03.9 ACQUIRED HYPOTHYROIDISM: ICD-10-CM

## 2023-09-08 DIAGNOSIS — E11.22 TYPE 2 DIABETES MELLITUS WITH STAGE 3A CHRONIC KIDNEY DISEASE, WITHOUT LONG-TERM CURRENT USE OF INSULIN (MULTI): ICD-10-CM

## 2023-09-08 DIAGNOSIS — I10 ESSENTIAL HYPERTENSION: ICD-10-CM

## 2023-09-08 DIAGNOSIS — N18.31 TYPE 2 DIABETES MELLITUS WITH STAGE 3A CHRONIC KIDNEY DISEASE, WITHOUT LONG-TERM CURRENT USE OF INSULIN (MULTI): ICD-10-CM

## 2023-09-08 LAB
ALANINE AMINOTRANSFERASE (SGPT) (U/L) IN SER/PLAS: 9 U/L (ref 7–45)
ALBUMIN (G/DL) IN SER/PLAS: 4.3 G/DL (ref 3.4–5)
ALKALINE PHOSPHATASE (U/L) IN SER/PLAS: 109 U/L (ref 33–136)
ANION GAP IN SER/PLAS: 13 MMOL/L (ref 10–20)
ASPARTATE AMINOTRANSFERASE (SGOT) (U/L) IN SER/PLAS: 13 U/L (ref 9–39)
BASOPHILS (10*3/UL) IN BLOOD BY AUTOMATED COUNT: 0.08 X10E9/L (ref 0–0.1)
BASOPHILS/100 LEUKOCYTES IN BLOOD BY AUTOMATED COUNT: 1.3 % (ref 0–2)
BILIRUBIN TOTAL (MG/DL) IN SER/PLAS: 0.5 MG/DL (ref 0–1.2)
CALCIUM (MG/DL) IN SER/PLAS: 9.4 MG/DL (ref 8.6–10.3)
CARBON DIOXIDE, TOTAL (MMOL/L) IN SER/PLAS: 29 MMOL/L (ref 21–32)
CHLORIDE (MMOL/L) IN SER/PLAS: 96 MMOL/L (ref 98–107)
CHOLESTEROL (MG/DL) IN SER/PLAS: 125 MG/DL (ref 0–199)
CHOLESTEROL IN HDL (MG/DL) IN SER/PLAS: 47 MG/DL
CHOLESTEROL/HDL RATIO: 2.7
CREATININE (MG/DL) IN SER/PLAS: 0.66 MG/DL (ref 0.5–1.05)
EOSINOPHILS (10*3/UL) IN BLOOD BY AUTOMATED COUNT: 0.17 X10E9/L (ref 0–0.4)
EOSINOPHILS/100 LEUKOCYTES IN BLOOD BY AUTOMATED COUNT: 2.9 % (ref 0–6)
ERYTHROCYTE DISTRIBUTION WIDTH (RATIO) BY AUTOMATED COUNT: 12.4 % (ref 11.5–14.5)
ERYTHROCYTE MEAN CORPUSCULAR HEMOGLOBIN CONCENTRATION (G/DL) BY AUTOMATED: 33 G/DL (ref 32–36)
ERYTHROCYTE MEAN CORPUSCULAR VOLUME (FL) BY AUTOMATED COUNT: 91 FL (ref 80–100)
ERYTHROCYTES (10*6/UL) IN BLOOD BY AUTOMATED COUNT: 3.92 X10E12/L (ref 4–5.2)
ESTIMATED AVERAGE GLUCOSE FOR HBA1C: 123 MG/DL
GFR FEMALE: 84 ML/MIN/1.73M2
GLUCOSE (MG/DL) IN SER/PLAS: 112 MG/DL (ref 74–99)
HEMATOCRIT (%) IN BLOOD BY AUTOMATED COUNT: 35.8 % (ref 36–46)
HEMOGLOBIN (G/DL) IN BLOOD: 11.8 G/DL (ref 12–16)
HEMOGLOBIN A1C/HEMOGLOBIN TOTAL IN BLOOD: 5.9 %
IMMATURE GRANULOCYTES/100 LEUKOCYTES IN BLOOD BY AUTOMATED COUNT: 0.5 % (ref 0–0.9)
LDL: 57 MG/DL (ref 0–99)
LEUKOCYTES (10*3/UL) IN BLOOD BY AUTOMATED COUNT: 5.9 X10E9/L (ref 4.4–11.3)
LYMPHOCYTES (10*3/UL) IN BLOOD BY AUTOMATED COUNT: 1.22 X10E9/L (ref 0.8–3)
LYMPHOCYTES/100 LEUKOCYTES IN BLOOD BY AUTOMATED COUNT: 20.6 % (ref 13–44)
MONOCYTES (10*3/UL) IN BLOOD BY AUTOMATED COUNT: 0.44 X10E9/L (ref 0.05–0.8)
MONOCYTES/100 LEUKOCYTES IN BLOOD BY AUTOMATED COUNT: 7.4 % (ref 2–10)
NEUTROPHILS (10*3/UL) IN BLOOD BY AUTOMATED COUNT: 3.99 X10E9/L (ref 1.6–5.5)
NEUTROPHILS/100 LEUKOCYTES IN BLOOD BY AUTOMATED COUNT: 67.3 % (ref 40–80)
PLATELETS (10*3/UL) IN BLOOD AUTOMATED COUNT: 337 X10E9/L (ref 150–450)
POTASSIUM (MMOL/L) IN SER/PLAS: 4.3 MMOL/L (ref 3.5–5.3)
PROTEIN TOTAL: 6.7 G/DL (ref 6.4–8.2)
SODIUM (MMOL/L) IN SER/PLAS: 134 MMOL/L (ref 136–145)
THYROTROPIN (MIU/L) IN SER/PLAS BY DETECTION LIMIT <= 0.05 MIU/L: 2.23 MIU/L (ref 0.44–3.98)
THYROXINE (T4) FREE (NG/DL) IN SER/PLAS: 1.34 NG/DL (ref 0.61–1.12)
TRIGLYCERIDE (MG/DL) IN SER/PLAS: 104 MG/DL (ref 0–149)
UREA NITROGEN (MG/DL) IN SER/PLAS: 11 MG/DL (ref 6–23)
VLDL: 21 MG/DL (ref 0–40)

## 2023-09-08 PROCEDURE — 83036 HEMOGLOBIN GLYCOSYLATED A1C: CPT

## 2023-09-08 PROCEDURE — 84443 ASSAY THYROID STIM HORMONE: CPT

## 2023-09-08 PROCEDURE — 84439 ASSAY OF FREE THYROXINE: CPT

## 2023-09-08 PROCEDURE — 36415 COLL VENOUS BLD VENIPUNCTURE: CPT

## 2023-09-08 PROCEDURE — 85025 COMPLETE CBC W/AUTO DIFF WBC: CPT

## 2023-09-08 PROCEDURE — 80061 LIPID PANEL: CPT

## 2023-09-08 PROCEDURE — 80053 COMPREHEN METABOLIC PANEL: CPT

## 2023-09-11 ENCOUNTER — OFFICE VISIT (OUTPATIENT)
Dept: PRIMARY CARE | Facility: CLINIC | Age: 88
End: 2023-09-11
Payer: MEDICARE

## 2023-09-11 VITALS
HEIGHT: 65 IN | BODY MASS INDEX: 21.83 KG/M2 | OXYGEN SATURATION: 96 % | HEART RATE: 90 BPM | TEMPERATURE: 96.4 F | WEIGHT: 131 LBS | SYSTOLIC BLOOD PRESSURE: 128 MMHG | RESPIRATION RATE: 16 BRPM | DIASTOLIC BLOOD PRESSURE: 70 MMHG

## 2023-09-11 DIAGNOSIS — Z79.899 MEDICATION MANAGEMENT: ICD-10-CM

## 2023-09-11 DIAGNOSIS — E78.2 MIXED HYPERLIPIDEMIA: ICD-10-CM

## 2023-09-11 DIAGNOSIS — E11.22 TYPE 2 DIABETES MELLITUS WITH STAGE 3A CHRONIC KIDNEY DISEASE, WITHOUT LONG-TERM CURRENT USE OF INSULIN (MULTI): Primary | ICD-10-CM

## 2023-09-11 DIAGNOSIS — R63.0 ANOREXIA: ICD-10-CM

## 2023-09-11 DIAGNOSIS — N18.31 CHRONIC KIDNEY DISEASE, STAGE 3A (MULTI): ICD-10-CM

## 2023-09-11 DIAGNOSIS — I10 ESSENTIAL HYPERTENSION: ICD-10-CM

## 2023-09-11 DIAGNOSIS — D64.9 CHRONIC ANEMIA: ICD-10-CM

## 2023-09-11 DIAGNOSIS — E03.9 ACQUIRED HYPOTHYROIDISM: ICD-10-CM

## 2023-09-11 DIAGNOSIS — F41.1 GENERALIZED ANXIETY DISORDER: ICD-10-CM

## 2023-09-11 DIAGNOSIS — K21.9 GASTROESOPHAGEAL REFLUX DISEASE WITHOUT ESOPHAGITIS: ICD-10-CM

## 2023-09-11 DIAGNOSIS — N18.31 TYPE 2 DIABETES MELLITUS WITH STAGE 3A CHRONIC KIDNEY DISEASE, WITHOUT LONG-TERM CURRENT USE OF INSULIN (MULTI): Primary | ICD-10-CM

## 2023-09-11 PROCEDURE — 80307 DRUG TEST PRSMV CHEM ANLYZR: CPT

## 2023-09-11 PROCEDURE — 80361 OPIATES 1 OR MORE: CPT

## 2023-09-11 PROCEDURE — 80373 DRUG SCREENING TRAMADOL: CPT

## 2023-09-11 PROCEDURE — 80368 SEDATIVE HYPNOTICS: CPT

## 2023-09-11 PROCEDURE — 80354 DRUG SCREENING FENTANYL: CPT

## 2023-09-11 PROCEDURE — 80365 DRUG SCREENING OXYCODONE: CPT

## 2023-09-11 PROCEDURE — 3074F SYST BP LT 130 MM HG: CPT | Performed by: FAMILY MEDICINE

## 2023-09-11 PROCEDURE — 1036F TOBACCO NON-USER: CPT | Performed by: FAMILY MEDICINE

## 2023-09-11 PROCEDURE — 1160F RVW MEDS BY RX/DR IN RCRD: CPT | Performed by: FAMILY MEDICINE

## 2023-09-11 PROCEDURE — 99214 OFFICE O/P EST MOD 30 MIN: CPT | Performed by: FAMILY MEDICINE

## 2023-09-11 PROCEDURE — 1159F MED LIST DOCD IN RCRD: CPT | Performed by: FAMILY MEDICINE

## 2023-09-11 PROCEDURE — 3078F DIAST BP <80 MM HG: CPT | Performed by: FAMILY MEDICINE

## 2023-09-11 PROCEDURE — 1125F AMNT PAIN NOTED PAIN PRSNT: CPT | Performed by: FAMILY MEDICINE

## 2023-09-11 PROCEDURE — 80358 DRUG SCREENING METHADONE: CPT

## 2023-09-11 PROCEDURE — 80346 BENZODIAZEPINES1-12: CPT

## 2023-09-11 RX ORDER — LEVOTHYROXINE SODIUM 50 UG/1
50 TABLET ORAL
Qty: 90 TABLET | Refills: 1 | Status: SHIPPED | OUTPATIENT
Start: 2023-09-11 | End: 2023-12-21 | Stop reason: SDUPTHER

## 2023-09-11 RX ORDER — ALPRAZOLAM 1 MG/1
1 TABLET ORAL 2 TIMES DAILY PRN
Qty: 60 TABLET | Refills: 1 | Status: SHIPPED | OUTPATIENT
Start: 2023-09-11 | End: 2023-12-04 | Stop reason: SDUPTHER

## 2023-09-11 RX ORDER — METFORMIN HYDROCHLORIDE 500 MG/1
500 TABLET, EXTENDED RELEASE ORAL
Qty: 90 TABLET | Refills: 1 | Status: SHIPPED | OUTPATIENT
Start: 2023-09-11 | End: 2024-04-17 | Stop reason: SDUPTHER

## 2023-09-11 RX ORDER — MIRTAZAPINE 15 MG/1
15 TABLET, FILM COATED ORAL NIGHTLY
Qty: 30 TABLET | Refills: 3 | Status: SHIPPED | OUTPATIENT
Start: 2023-09-11 | End: 2023-12-04 | Stop reason: SDUPTHER

## 2023-09-11 RX ORDER — PANTOPRAZOLE SODIUM 40 MG/1
40 TABLET, DELAYED RELEASE ORAL DAILY
Qty: 90 TABLET | Refills: 1 | Status: SHIPPED | OUTPATIENT
Start: 2023-09-11 | End: 2024-04-01 | Stop reason: SDUPTHER

## 2023-09-11 ASSESSMENT — PATIENT HEALTH QUESTIONNAIRE - PHQ9
2. FEELING DOWN, DEPRESSED OR HOPELESS: NOT AT ALL
SUM OF ALL RESPONSES TO PHQ9 QUESTIONS 1 AND 2: 0
1. LITTLE INTEREST OR PLEASURE IN DOING THINGS: NOT AT ALL

## 2023-09-11 ASSESSMENT — ANXIETY QUESTIONNAIRES
6. BECOMING EASILY ANNOYED OR IRRITABLE: NOT AT ALL
4. TROUBLE RELAXING: NOT AT ALL
3. WORRYING TOO MUCH ABOUT DIFFERENT THINGS: NOT AT ALL
IF YOU CHECKED OFF ANY PROBLEMS ON THIS QUESTIONNAIRE, HOW DIFFICULT HAVE THESE PROBLEMS MADE IT FOR YOU TO DO YOUR WORK, TAKE CARE OF THINGS AT HOME, OR GET ALONG WITH OTHER PEOPLE: NOT DIFFICULT AT ALL
2. NOT BEING ABLE TO STOP OR CONTROL WORRYING: NOT AT ALL
7. FEELING AFRAID AS IF SOMETHING AWFUL MIGHT HAPPEN: NOT AT ALL
GAD7 TOTAL SCORE: 0
1. FEELING NERVOUS, ANXIOUS, OR ON EDGE: NOT AT ALL
5. BEING SO RESTLESS THAT IT IS HARD TO SIT STILL: NOT AT ALL

## 2023-09-11 ASSESSMENT — ENCOUNTER SYMPTOMS
LOSS OF SENSATION IN FEET: 0
DEPRESSION: 0
OCCASIONAL FEELINGS OF UNSTEADINESS: 0

## 2023-09-11 NOTE — PROGRESS NOTES
Chief Complaint   Patient presents with    Follow-up     Diabetes, Hypertension, Hyperlipidemia, Hypothyroidism, Anxiety and labs      She presents today for follow up on Diabetes Mellitus, hypertension, hyperlipidemia    Current  diabetes related symptoms include: none. Patient denies foot ulcerations, hypoglycemia , nausea, paresthesia of the feet, polydipsia, polyuria, visual disturbances, vomiting, and weight loss.  Patient denies chest pain, claudication, dyspnea, exertional chest pressure/discomfort, irregular heart beat, lower extremity edema, orthopnea, palpitations, paroxysmal nocturnal dyspnea, and syncope. Evaluation to date has included: fasting blood sugar, fasting lipid panel, hemoglobin A1C, and microalbuminuria.  Home sugars: patient does not check sugars.   Patient denies chest pain, claudication, dyspnea, exertional chest pressure/discomfort, irregular heart beat, lower extremity edema, orthopnea, palpitations, paroxysmal nocturnal dyspnea, and syncope.     She presents for follow up of anxiety disorder. She has the following anxiety symptoms: difficulty concentrating, feelings of losing control, psychomotor agitation, and racing thoughts. Symptoms have been unchanged since that time. She denies current suicidal and homicidal ideation. Previous treatment includes Xanax. She complains of the following medication side effects: none.     She complains of loss of appetite. This has been an ongoing issue and has been unchanged overall.     Past Medical History:   Diagnosis Date    Acute sinusitis, unspecified 04/24/2019    Acute non-recurrent sinusitis, unspecified location    Encounter for general adult medical examination without abnormal findings 02/19/2020    Routine general medical examination at health care facility    Personal history of other endocrine, nutritional and metabolic disease     History of thyroid disorder     Patient Active Problem List    Diagnosis Date Noted    Anorexia 09/11/2023     Closed displaced intertrochanteric fracture of left femur (CMS/HCC) 07/12/2023    Acquired hypothyroidism 01/25/2023    Acute UTI 01/25/2023    Allergic rhinitis 01/25/2023    Appetite loss 01/25/2023    Chronic anemia 01/25/2023    Chronic kidney disease, stage 3a (CMS/HCC) 01/25/2023    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    Decreased mobility 01/25/2023    Dysphagia 01/25/2023    Ear pain 01/25/2023    Esophageal spasm 01/25/2023    GERD (gastroesophageal reflux disease) 01/25/2023    Essential hypertension 01/25/2023    Generalized anxiety disorder 01/25/2023    Glaucoma 01/25/2023    Hyponatremia 01/25/2023    Mixed hyperlipidemia 01/25/2023    Neck mass 01/25/2023    Severe acute respiratory syndrome coronavirus 2 (SARS-CoV-2) detected 01/25/2023    Type 2 diabetes mellitus with stage 3a chronic kidney disease, without long-term current use of insulin (CMS/HCC) 01/25/2023    UTI symptoms 01/25/2023    Vitamin B12 deficiency 01/25/2023    Weight loss 01/25/2023     Past Surgical History:   Procedure Laterality Date    MR CHEST ANGIO W IV CONTRAST  12/5/2014    MR CHEST ANGIO W IV CONTRAST 12/5/2014 [facility]    OTHER SURGICAL HISTORY  02/27/2019    Umbilical hernia repair     No family history on file.    Social History     Socioeconomic History    Marital status:      Spouse name: None    Number of children: None    Years of education: None    Highest education level: None   Occupational History    None   Tobacco Use    Smoking status: Never    Smokeless tobacco: Never   Vaping Use    Vaping Use: Never used   Substance and Sexual Activity    Alcohol use: Never    Drug use: Never    Sexual activity: None   Other Topics Concern    None   Social History Narrative    None     Social Determinants of Health     Financial Resource Strain: Not on file   Food Insecurity: Not on file   Transportation Needs: Not on file   Physical Activity: Not on file   Stress: Not on file   Social  Connections: Not on file   Intimate Partner Violence: Not on file   Housing Stability: Not on file     Current Outpatient Medications   Medication Sig Dispense Refill    albuterol 90 mcg/actuation inhaler Inhale. INHALE 1 TO 2 PUFFS EVERY 4 TO 6 HOURS AS NEEDED.      aspirin 81 mg chewable tablet Chew.      atorvastatin (Lipitor) 20 mg tablet Take 1 tablet (20 mg) by mouth once daily.      bimatoprost (Lumigan) 0.03 % ophthalmic solution Administer into affected eye(s).      cyanocobalamin (Vitamin B-12) 1,000 mcg tablet Take 1 tablet (1,000 mcg) by mouth once daily. 30 tablet 3    cyanocobalamin (Vitamin B-12) 1,000 mcg/mL injection 1 mL (1,000 mcg) every 14 (fourteen) days. Inject      docusate sodium (Colace) 100 mg capsule Take 1 capsule (100 mg) by mouth 2 times a day.      fluticasone (Flonase) 50 mcg/actuation nasal spray Administer 2 sprays into each nostril once daily. SHAKE LIQUID      lancets 33 gauge misc       metoprolol tartrate (Lopressor) 25 mg tablet Take 0.5 tablets (12.5 mg) by mouth. 2 times daily      NIFEdipine CC (Adalat CC) 30 mg 24 hr tablet Take 1 tablet (30 mg) by mouth once daily. 90 tablet 1    nitroglycerin (Nitrostat) 0.4 mg SL tablet Place 1 tablet (0.4 mg) under the tongue every 5 minutes if needed.      OneTouch Ultra Test strip TEST 3 TIMES DAILY.TEST 3 TIMES DAILY. 300 strip 3    tamsulosin (Flomax) 0.4 mg 24 hr capsule Take 1 capsule (0.4 mg) by mouth once daily.      timolol (Timoptic) 0.5 % ophthalmic solution Administer 1 drop into both eyes once daily.      ALPRAZolam (Xanax) 1 mg tablet Take 1 tablet (1 mg) by mouth 2 times a day as needed for anxiety. 60 tablet 1    levothyroxine (Synthroid, Levoxyl) 50 mcg tablet Take 1 tablet (50 mcg) by mouth once daily in the morning. Take before meals. 90 tablet 1    metFORMIN  mg 24 hr tablet Take 1 tablet (500 mg) by mouth once daily in the evening. Take with meals. 90 tablet 1    mirtazapine (Remeron) 15 mg tablet Take 1  tablet (15 mg) by mouth once daily at bedtime. 30 tablet 3    pantoprazole (ProtoNix) 40 mg EC tablet Take 1 tablet (40 mg) by mouth once daily. 90 tablet 1     No current facility-administered medications for this visit.     Current Outpatient Medications on File Prior to Visit   Medication Sig Dispense Refill    albuterol 90 mcg/actuation inhaler Inhale. INHALE 1 TO 2 PUFFS EVERY 4 TO 6 HOURS AS NEEDED.      aspirin 81 mg chewable tablet Chew.      atorvastatin (Lipitor) 20 mg tablet Take 1 tablet (20 mg) by mouth once daily.      bimatoprost (Lumigan) 0.03 % ophthalmic solution Administer into affected eye(s).      cyanocobalamin (Vitamin B-12) 1,000 mcg tablet Take 1 tablet (1,000 mcg) by mouth once daily. 30 tablet 3    cyanocobalamin (Vitamin B-12) 1,000 mcg/mL injection 1 mL (1,000 mcg) every 14 (fourteen) days. Inject      docusate sodium (Colace) 100 mg capsule Take 1 capsule (100 mg) by mouth 2 times a day.      fluticasone (Flonase) 50 mcg/actuation nasal spray Administer 2 sprays into each nostril once daily. SHAKE LIQUID      lancets 33 gauge misc       metoprolol tartrate (Lopressor) 25 mg tablet Take 0.5 tablets (12.5 mg) by mouth. 2 times daily      NIFEdipine CC (Adalat CC) 30 mg 24 hr tablet Take 1 tablet (30 mg) by mouth once daily. 90 tablet 1    nitroglycerin (Nitrostat) 0.4 mg SL tablet Place 1 tablet (0.4 mg) under the tongue every 5 minutes if needed.      OneTouch Ultra Test strip TEST 3 TIMES DAILY.TEST 3 TIMES DAILY. 300 strip 3    tamsulosin (Flomax) 0.4 mg 24 hr capsule Take 1 capsule (0.4 mg) by mouth once daily.      timolol (Timoptic) 0.5 % ophthalmic solution Administer 1 drop into both eyes once daily.      [DISCONTINUED] ALPRAZolam (Xanax) 1 mg tablet Take 1 tablet (1 mg) by mouth 2 times a day as needed for anxiety. 60 tablet 1    [DISCONTINUED] levothyroxine (Synthroid, Levoxyl) 50 mcg tablet Take 1 tablet (50 mcg) by mouth once daily.      [DISCONTINUED] metFORMIN XR  "(Glucophage-XR) 500 mg 24 hr tablet Take 1 tablet (500 mg) by mouth once daily in the evening. Take with meals. 90 tablet 1    [DISCONTINUED] pantoprazole (ProtoNix) 40 mg EC tablet Take 1 tablet (40 mg) by mouth once daily.      [DISCONTINUED] enoxaparin (Lovenox) 40 mg/0.4 mL syringe Inject 0.4 mL (40 mg) under the skin once daily.       No current facility-administered medications on file prior to visit.     Allergies   Allergen Reactions    Bee Venom Protein (Honey Bee) Anaphylaxis    Losartan Potassium Unknown    Sulfa (Sulfonamide Antibiotics) Unknown    Sulfamethoxazole Unknown      Review of Systems - General ROS: negative for - fatigue, fever, malaise, night sweats, sleep disturbance or weight loss  ENT ROS: negative for - hearing change, nasal discharge, oral lesions, sinus pain, sore throat, tinnitus or vertigo  Allergy and Immunology ROS: negative for - hives, nasal congestion or seasonal allergies  Hematological and Lymphatic ROS: negative for - bruising, fatigue, night sweats or pallor  Endocrine ROS: negative for - hot flashes, malaise/lethargy, palpitations, polydipsia/polyuria, skin changes, temperature intolerance or unexpected weight changes  Respiratory ROS: negative for - cough, hemoptysis, pleuritic pain, shortness of breath or wheezing  Cardiovascular ROS: no chest pain or dyspnea on exertion  Gastrointestinal ROS: no abdominal pain, change in bowel habits, or black or bloody stools  Genito-Urinary ROS: no dysuria, trouble voiding, or hematuria  Musculoskeletal ROS: negative for - joint pain, joint stiffness, joint swelling, muscle pain or muscular weakness  Neurological ROS: negative for - dizziness, gait disturbance, headaches, impaired coordination/balance, numbness/tingling, tremors or visual changes  Dermatological ROS: negative for - dry skin, lumps, pruritus or rash    Blood pressure 128/70, pulse 90, temperature 35.8 °C (96.4 °F), resp. rate 16, height 1.651 m (5' 5\"), weight 59.4 kg " (131 lb), SpO2 96 %.    Physical Examination: General appearance - alert, well appearing, and in no distress  Mental status - alert, oriented to person, place, and time  Eyes - pupils equal and reactive, extraocular eye movements intact  Ears - bilateral TM's and external ear canals normal  Mouth - mucous membranes moist, pharynx normal without lesions  Neck - supple, no significant adenopathy  Lymphatics - no palpable lymphadenopathy, no hepatosplenomegaly  Chest - clear to auscultation, no wheezes, rales or rhonchi, symmetric air entry  Heart - normal rate, regular rhythm, normal S1, S2, no murmurs, rubs, clicks or gallops  Abdomen - soft, nontender, nondistended, no masses or organomegaly  Neurological - alert, oriented, normal speech, no focal findings or movement disorder noted  Musculoskeletal - no joint tenderness, deformity or swelling  Extremities: peripheral pulses normal, no pedal edema, no clubbing or cyanosis.    Lab on 09/08/2023   Component Date Value Ref Range Status    WBC 09/08/2023 5.9  4.4 - 11.3 x10E9/L Final    RBC 09/08/2023 3.92 (L)  4.00 - 5.20 x10E12/L Final    Hemoglobin 09/08/2023 11.8 (L)  12.0 - 16.0 g/dL Final    Hematocrit 09/08/2023 35.8 (L)  36.0 - 46.0 % Final    MCV 09/08/2023 91  80 - 100 fL Final    MCHC 09/08/2023 33.0  32.0 - 36.0 g/dL Final    Platelets 09/08/2023 337  150 - 450 x10E9/L Final    RDW 09/08/2023 12.4  11.5 - 14.5 % Final    Neutrophils % 09/08/2023 67.3  40.0 - 80.0 % Final    Immature Granulocytes %, Automated 09/08/2023 0.5  0.0 - 0.9 % Final     Immature Granulocyte Count (IG) includes promyelocytes,    myelocytes and metamyelocytes but does not include bands.   Percent differential counts (%) should be interpreted in the   context of the absolute cell counts (cells/L).    Lymphocytes % 09/08/2023 20.6  13.0 - 44.0 % Final    Monocytes % 09/08/2023 7.4  2.0 - 10.0 % Final    Eosinophils % 09/08/2023 2.9  0.0 - 6.0 % Final    Basophils % 09/08/2023 1.3  0.0 -  2.0 % Final    Neutrophils Absolute 09/08/2023 3.99  1.60 - 5.50 x10E9/L Final    Lymphocytes Absolute 09/08/2023 1.22  0.80 - 3.00 x10E9/L Final    Monocytes Absolute 09/08/2023 0.44  0.05 - 0.80 x10E9/L Final    Eosinophils Absolute 09/08/2023 0.17  0.00 - 0.40 x10E9/L Final    Basophils Absolute 09/08/2023 0.08  0.00 - 0.10 x10E9/L Final    Glucose 09/08/2023 112 (H)  74 - 99 mg/dL Final    Sodium 09/08/2023 134 (L)  136 - 145 mmol/L Final    Potassium 09/08/2023 4.3  3.5 - 5.3 mmol/L Final    Chloride 09/08/2023 96 (L)  98 - 107 mmol/L Final    Bicarbonate 09/08/2023 29  21 - 32 mmol/L Final    Anion Gap 09/08/2023 13  10 - 20 mmol/L Final    Urea Nitrogen 09/08/2023 11  6 - 23 mg/dL Final    Creatinine 09/08/2023 0.66  0.50 - 1.05 mg/dL Final    GFR Female 09/08/2023 84  >90 mL/min/1.73m2 Final     CALCULATIONS OF ESTIMATED GFR ARE PERFORMED   USING THE 2021 CKD-EPI STUDY REFIT EQUATION   WITHOUT THE RACE VARIABLE FOR THE IDMS-TRACEABLE   CREATININE METHODS.    https://jasn.asnjournals.org/content/early/2021/09/22/ASN.[email or web address]    Calcium 09/08/2023 9.4  8.6 - 10.3 mg/dL Final    Albumin 09/08/2023 4.3  3.4 - 5.0 g/dL Final    Alkaline Phosphatase 09/08/2023 109  33 - 136 U/L Final    Total Protein 09/08/2023 6.7  6.4 - 8.2 g/dL Final    AST 09/08/2023 13  9 - 39 U/L Final    Total Bilirubin 09/08/2023 0.5  0.0 - 1.2 mg/dL Final    ALT (SGPT) 09/08/2023 9  7 - 45 U/L Final     Patients treated with Sulfasalazine may generate    falsely decreased results for ALT.    Hemoglobin A1C 09/08/2023 5.9 (A)  % Final         Diagnosis of Diabetes-Adults   Non-Diabetic: < or = 5.6%   Increased risk for developing diabetes: 5.7-6.4%   Diagnostic of diabetes: > or = 6.5%  .       Monitoring of Diabetes                Age (y)     Therapeutic Goal (%)   Adults:          >18           <7.0   Pediatrics:    13-18           <7.5                   7-12           <8.0                   0- 6            7.5-8.5   American Diabetes  Association. Diabetes Care 33(S1), Jan 2010.    Estimated Average Glucose 09/08/2023 123  MG/DL Final    Cholesterol 09/08/2023 125  0 - 199 mg/dL Final    .      AGE      DESIRABLE   BORDERLINE HIGH   HIGH     0-19 Y     0 - 169       170 - 199     >/= 200    20-24 Y     0 - 189       190 - 224     >/= 225         >24 Y     0 - 199       200 - 239     >/= 240   **All ranges are based on fasting samples. Specific   therapeutic targets will vary based on patient-specific   cardiac risk.  .   Pediatric guidelines reference:Pediatrics 2011, 128(S5).   Adult guidelines reference: NCEP ATPIII Guidelines,     GABRIELLE 2001, 258:2486-97  .   Venipuncture immediately after or during the    administration of Metamizole may lead to falsely   low results. Testing should be performed immediately   prior to Metamizole dosing.    HDL 09/08/2023 47.0  mg/dL Final    .      AGE      VERY LOW   LOW     NORMAL    HIGH       0-19 Y       < 35   < 40     40-45     ----    20-24 Y       ----   < 40       >45     ----      >24 Y       ----   < 40     40-60      >60  .    Cholesterol/HDL Ratio 09/08/2023 2.7   Final    REF VALUES  DESIRABLE  < 3.4  HIGH RISK  > 5.0    LDL 09/08/2023 57  0 - 99 mg/dL Final    .                           NEAR      BORD      AGE      DESIRABLE  OPTIMAL    HIGH     HIGH     VERY HIGH     0-19 Y     0 - 109     ---    110-129   >/= 130     ----    20-24 Y     0 - 119     ---    120-159   >/= 160     ----      >24 Y     0 -  99   100-129  130-159   160-189     >/=190  .    VLDL 09/08/2023 21  0 - 40 mg/dL Final    Triglycerides 09/08/2023 104  0 - 149 mg/dL Final    .      AGE      DESIRABLE   BORDERLINE HIGH   HIGH     VERY HIGH   0 D-90 D    19 - 174         ----         ----        ----  91 D- 9 Y     0 -  74        75 -  99     >/= 100      ----    10-19 Y     0 -  89        90 - 129     >/= 130      ----    20-24 Y     0 - 114       115 - 149     >/= 150      ----         >24 Y     0 - 149       150 - 199     200- 499    >/= 500  .   Venipuncture immediately after or during the    administration of Metamizole may lead to falsely   low results. Testing should be performed immediately   prior to Metamizole dosing.    TSH 09/08/2023 2.23  0.44 - 3.98 mIU/L Final     TSH testing is performed using different testing    methodology at [facility] than at [facility]. Direct result comparisons should    only be made within the same method.    Free T4 09/08/2023 1.34 (H)  0.61 - 1.12 ng/dL Final     Thyroxine Free testing is performed using different testing    methodology at [facility] than at other    [facility]. Direct result comparisons should    only be made within the same method.  .   Biotin can cause falsely elevated free T4 results. Patients   taking a Biotin dose of up to 10 mg/day should refrain from   taking Biotin for 24 hours before sample collection. Patient   taking a Biotin dose of >10 mg/day should consult with their   physician or the laboratory before the blood draw.       Problem List Items Addressed This Visit       Acquired hypothyroidism    Current Assessment & Plan     Well-controlled, continue current medications and management.         Relevant Medications    levothyroxine (Synthroid, Levoxyl) 50 mcg tablet    Other Relevant Orders    Thyroid Stimulating Hormone    Thyroxine, Free    Follow Up In [facility] - PCP - Established    Chronic anemia    Current Assessment & Plan     Stable based on symptoms and exam.  Continue established treatment plan and follow-up as scheduled.         Chronic kidney disease, stage 3a (CMS/HCC)    Current Assessment & Plan     Well-controlled, continue current medications and management.         GERD (gastroesophageal reflux disease)    Current Assessment & Plan     Well-controlled, continue current medications and management.         Relevant Medications    pantoprazole (ProtoNix) 40 mg EC tablet    Essential  hypertension    Current Assessment & Plan     Well-controlled, continue current medications and management.         Relevant Orders    CBC and Auto Differential    Hemoglobin A1C    Comprehensive Metabolic Panel    Lipid Panel    Follow Up In [facility] - PCP - Established    Generalized anxiety disorder    Current Assessment & Plan     The risks and benefits of my recommendations, as well as other treatment options were discussed with the patient today.    The side effects of the medications were discussed.  Advised not to take the medication with alcohol .  Exercise regularly and this can give you a sense of well being and help decrease feelings of anxiety.;  Get plenty of sleep. Sleep rests your brain as well as your body, and can improve your general sense of wellbeing as well as your mood.;  Avoid alcohol and drug abuse. It may seem that alcohol or drugs relax you. But in the long run they make anxiety worse and cause more problems.;  Avoid caffeine. Caffeine is found in coffee, tea, soft drinks and chocolate. Caffeine may increase your sense of anxiety because it stimulates your nervous system. Also avoid over-the-counter diet pills, and cough and cold medicines that contain a decongestant.;  Confront the things that have made you anxious in the past. Begin by just picturing yourself confronting these things. By doing this, you can get used to the idea of confronting the things that make you anxious before you actually do it. After you feel more comfortable picturing yourself confronting these things, you can begin to actually face them.;  If you feel yourself getting anxious, practice a relaxation technique or focus on a simple task, such as counting backward from 100 to 0.;  Although feelings of anxiety are scary, they won't hurt you. Label the level of your fear from 0 to 10 and keep track as it goes up and down. Notice that it doesn't stay at a very high level for more than a few seconds. When  the fear comes, accept it. Wait and give it time to pass without running away from it.;  Take medications as prescribed.           Relevant Medications    ALPRAZolam (Xanax) 1 mg tablet    mirtazapine (Remeron) 15 mg tablet    Other Relevant Orders    Opiate/Opioid/Benzo Extended Prescription Compliance    Follow Up In [facility] - PCP - Established    Mixed hyperlipidemia    Current Assessment & Plan     Well-controlled, continue current medications and management.         Relevant Orders    CBC and Auto Differential    Hemoglobin A1C    Comprehensive Metabolic Panel    Lipid Panel    Follow Up In [facility] - PCP - Established    Type 2 diabetes mellitus with stage 3a chronic kidney disease, without long-term current use of insulin (CMS/[facility]) - Primary    Current Assessment & Plan     Diabetes Mellitus type II, under good control.  1. Rx changes: None  2. Education: Reviewed ‘ABCs’ of diabetes management (respective goals in parentheses):  A1C (<7), blood pressure (<130/80), and cholesterol (LDL <100).  3. Compliance at present is estimated to be good. Efforts to improve compliance (if necessary) will be directed at dietary modifications: and increased exercise.  4. Follow up: 3 months           Relevant Medications    metFORMIN  mg 24 hr tablet    Other Relevant Orders    CBC and Auto Differential    Hemoglobin A1C    Comprehensive Metabolic Panel    Lipid Panel    Follow Up In [facility] - PCP - Established    Anorexia    Current Assessment & Plan     The risks and benefits of my recommendations, as well as other treatment options were discussed with the patient today.  We will start her on Mirtazepine 15 mg nightly.                   Relevant Medications    mirtazapine (Remeron) 15 mg tablet     Other Visit Diagnoses       Medication management        Relevant Orders    Opiate/Opioid/Benzo Extended Prescription Compliance          OARRS:  Darius Quezada MD on 9/11/2023  12:10 PM  I have personally reviewed the OARRS report for Katelyn Weiss. I have considered the risks of abuse, dependence, addiction and diversion    Is the patient prescribed a combination of a benzodiazepine and opioid?  No    Last Urine Drug Screen / ordered today: Yes  No results found for this or any previous visit (from the past 8760 hour(s)).  Results are as expected.     Controlled Substance Agreement:  Date of the Last Agreement: 3/9/2023  Reviewed Controlled Substance Agreement including but not limited to the benefits, risks, and alternatives to treatment with a Controlled Substance medication(s).    Benzodiazepines:  What is the patient's goal of therapy? Able to function without being unduly anxious and carry out day-to-day activities without tension and panic attacks    Is this being achieved with current treatment? yes    JOSE-7:  Over the last 2 weeks, how often have you been bothered by any of the following problems?  Feeling nervous, anxious, or on edge: 0  Not being able to stop or control worryin  Worrying too much about different things: 0  Trouble relaxin  Being so restless that it is hard to sit still: 0  Becoming easily annoyed or irritable: 0  Feeling afraid as if something awful might happen: 0  JOSE-7 Total Score: 0    Activities of Daily Living:   Is your overall impression that this patient is benefiting (symptom reduction outweighs side effects) from benzodiazepine therapy? Yes     1. Physical Functioning: Better  2. Family Relationship: Better  3. Social Relationship: Better  4. Mood: Better  5. Sleep Patterns: Better  6. Overall Function: Better    Patient to keep food diary and log of blood sugars and bring to next office visit. Patient encouraged to increase activity level gradually and encouraged weight loss. Strongly encouraged to maintain blood sugar at levels as close to normal as possible thus preventing or delaying complications (regular medical care is important for  this). Encouraged to follow a balanced meal plan. Eat consistent and moderate amounts of food at regular times. Nuts and peanut butter are a good choice for a snack. Advised patient to not skip meals. Recommended that patient: Eat plenty of vegetables and fiber, limited amounts of fat, moderate amounts of protein and low-fat dairy products, and carefully limit foods containing high concentrated sugar. Keep a record of your food intake. We will review at the next visit. Educated patient about exercise. Exercise lowers blood glucose levels. Regular exercise (eg, 30-60 minutes of walking every day) can help keep blood glucose in better control. Exercise increases insulin sensitivity and helps an individual lose weight. It can also help overall health.     Scribe Attestation  By signing my name below, IEze Scribe   attest that this documentation has been prepared under the direction and in the presence of Darius Quezada MD.

## 2023-09-11 NOTE — ASSESSMENT & PLAN NOTE
The risks and benefits of my recommendations, as well as other treatment options were discussed with the patient today.  We will start her on Mirtazepine 15 mg nightly.

## 2023-09-11 NOTE — ASSESSMENT & PLAN NOTE
Stable based on symptoms and exam.  Continue established treatment plan and follow-up as scheduled.

## 2023-09-11 NOTE — ASSESSMENT & PLAN NOTE
The risks and benefits of my recommendations, as well as other treatment options were discussed with the patient today.    The side effects of the medications were discussed.  Advised not to take the medication with alcohol .  Exercise regularly and this can give you a sense of well being and help decrease feelings of anxiety.;  Get plenty of sleep. Sleep rests your brain as well as your body, and can improve your general sense of wellbeing as well as your mood.;  Avoid alcohol and drug abuse. It may seem that alcohol or drugs relax you. But in the long run they make anxiety worse and cause more problems.;  Avoid caffeine. Caffeine is found in coffee, tea, soft drinks and chocolate. Caffeine may increase your sense of anxiety because it stimulates your nervous system. Also avoid over-the-counter diet pills, and cough and cold medicines that contain a decongestant.;  Confront the things that have made you anxious in the past. Begin by just picturing yourself confronting these things. By doing this, you can get used to the idea of confronting the things that make you anxious before you actually do it. After you feel more comfortable picturing yourself confronting these things, you can begin to actually face them.;  If you feel yourself getting anxious, practice a relaxation technique or focus on a simple task, such as counting backward from 100 to 0.;  Although feelings of anxiety are scary, they won't hurt you. Label the level of your fear from 0 to 10 and keep track as it goes up and down. Notice that it doesn't stay at a very high level for more than a few seconds. When the fear comes, accept it. Wait and give it time to pass without running away from it.;  Take medications as prescribed.

## 2023-09-15 LAB
6-ACETYLMORPHINE: <25 NG/ML
7-AMINOCLONAZEPAM: <25 NG/ML
ALPHA-HYDROXYALPRAZOLAM: 805 NG/ML
ALPHA-HYDROXYMIDAZOLAM: <25 NG/ML
ALPRAZOLAM: 269 NG/ML
AMPHETAMINE (PRESENCE) IN URINE BY SCREEN METHOD: ABNORMAL
BARBITURATES PRESENCE IN URINE BY SCREEN METHOD: ABNORMAL
CANNABINOIDS IN URINE BY SCREEN METHOD: ABNORMAL
CHLORDIAZEPOXIDE: <25 NG/ML
CLONAZEPAM: <25 NG/ML
COCAINE (PRESENCE) IN URINE BY SCREEN METHOD: ABNORMAL
CODEINE: <50 NG/ML
CREATINE, URINE FOR DRUG: 129 MG/DL
DIAZEPAM: <25 NG/ML
DRUG SCREEN COMMENT URINE: ABNORMAL
EDDP: <25 NG/ML
FENTANYL CONFIRMATION, URINE: <2.5 NG/ML
HYDROCODONE: <25 NG/ML
HYDROMORPHONE: <25 NG/ML
LORAZEPAM: <25 NG/ML
METHADONE CONFIRMATION,URINE: <25 NG/ML
MIDAZOLAM: <25 NG/ML
MORPHINE URINE: <50 NG/ML
NORDIAZEPAM: <25 NG/ML
NORFENTANYL: <2.5 NG/ML
NORHYDROCODONE: <25 NG/ML
NOROXYCODONE: <25 NG/ML
O-DESMETHYLTRAMADOL: <50 NG/ML
OXAZEPAM: <25 NG/ML
OXYCODONE: <25 NG/ML
OXYMORPHONE: <25 NG/ML
PHENCYCLIDINE (PRESENCE) IN URINE BY SCREEN METHOD: ABNORMAL
TEMAZEPAM: <25 NG/ML
TRAMADOL: <50 NG/ML
ZOLPIDEM METABOLITE (ZCA): <25 NG/ML
ZOLPIDEM: <25 NG/ML

## 2023-09-21 ENCOUNTER — TELEPHONE (OUTPATIENT)
Dept: PRIMARY CARE | Facility: CLINIC | Age: 88
End: 2023-09-21
Payer: MEDICARE

## 2023-09-21 DIAGNOSIS — R26.89 DECREASED MOBILITY: ICD-10-CM

## 2023-09-21 NOTE — TELEPHONE ENCOUNTER
Per Dr. Damien phillip for order and to fax with office note    Referral placed and all information faxed

## 2023-09-21 NOTE — TELEPHONE ENCOUNTER
Patient's Home Health Aid Ondina from [facility] called in regarding the patient. She stated the patient is requesting to resume physical therapy. Ondina is asking for an order for this along with a recent office note to be faxed to them at [phone]  Please Advise

## 2023-10-30 DIAGNOSIS — E78.2 MIXED HYPERLIPIDEMIA: ICD-10-CM

## 2023-10-30 RX ORDER — ATORVASTATIN CALCIUM 20 MG/1
20 TABLET, FILM COATED ORAL DAILY
Qty: 90 TABLET | Refills: 1 | Status: SHIPPED | OUTPATIENT
Start: 2023-10-30 | End: 2024-04-24 | Stop reason: SDUPTHER

## 2023-10-30 NOTE — TELEPHONE ENCOUNTER
Rx Refill Request     Name: Katelyn Weiss  :  1935   Medication Name:  atorvastatin 20mg  Specific Pharmacy location: [facility]    Date of last appointment:  2023   Date of next appointment:  2023   Best number to reach patient:  [phone]

## 2023-11-14 ENCOUNTER — OFFICE VISIT (OUTPATIENT)
Dept: CARDIOLOGY CLINIC | Age: 88
End: 2023-11-14
Payer: MEDICARE

## 2023-11-14 VITALS
DIASTOLIC BLOOD PRESSURE: 52 MMHG | HEIGHT: 65 IN | HEART RATE: 64 BPM | WEIGHT: 134.6 LBS | BODY MASS INDEX: 22.42 KG/M2 | OXYGEN SATURATION: 97 % | SYSTOLIC BLOOD PRESSURE: 120 MMHG

## 2023-11-14 DIAGNOSIS — I10 ESSENTIAL HYPERTENSION: Primary | ICD-10-CM

## 2023-11-14 PROCEDURE — 4004F PT TOBACCO SCREEN RCVD TLK: CPT | Performed by: INTERNAL MEDICINE

## 2023-11-14 PROCEDURE — 93000 ELECTROCARDIOGRAM COMPLETE: CPT | Performed by: INTERNAL MEDICINE

## 2023-11-14 PROCEDURE — G8427 DOCREV CUR MEDS BY ELIG CLIN: HCPCS | Performed by: INTERNAL MEDICINE

## 2023-11-14 PROCEDURE — 1123F ACP DISCUSS/DSCN MKR DOCD: CPT | Performed by: INTERNAL MEDICINE

## 2023-11-14 PROCEDURE — G8484 FLU IMMUNIZE NO ADMIN: HCPCS | Performed by: INTERNAL MEDICINE

## 2023-11-14 PROCEDURE — G8420 CALC BMI NORM PARAMETERS: HCPCS | Performed by: INTERNAL MEDICINE

## 2023-11-14 PROCEDURE — 1090F PRES/ABSN URINE INCON ASSESS: CPT | Performed by: INTERNAL MEDICINE

## 2023-11-14 PROCEDURE — 99213 OFFICE O/P EST LOW 20 MIN: CPT | Performed by: INTERNAL MEDICINE

## 2023-12-01 ENCOUNTER — LAB (OUTPATIENT)
Dept: LAB | Facility: LAB | Age: 88
End: 2023-12-01
Payer: MEDICARE

## 2023-12-01 DIAGNOSIS — I10 ESSENTIAL HYPERTENSION: ICD-10-CM

## 2023-12-01 DIAGNOSIS — E11.22 TYPE 2 DIABETES MELLITUS WITH STAGE 3A CHRONIC KIDNEY DISEASE, WITHOUT LONG-TERM CURRENT USE OF INSULIN (MULTI): ICD-10-CM

## 2023-12-01 DIAGNOSIS — N18.31 TYPE 2 DIABETES MELLITUS WITH STAGE 3A CHRONIC KIDNEY DISEASE, WITHOUT LONG-TERM CURRENT USE OF INSULIN (MULTI): ICD-10-CM

## 2023-12-01 DIAGNOSIS — E03.9 ACQUIRED HYPOTHYROIDISM: ICD-10-CM

## 2023-12-01 DIAGNOSIS — E78.2 MIXED HYPERLIPIDEMIA: ICD-10-CM

## 2023-12-01 LAB
ALBUMIN SERPL BCP-MCNC: 4.2 G/DL (ref 3.4–5)
ALP SERPL-CCNC: 89 U/L (ref 33–136)
ALT SERPL W P-5'-P-CCNC: 7 U/L (ref 7–45)
ANION GAP SERPL CALC-SCNC: 12 MMOL/L (ref 10–20)
AST SERPL W P-5'-P-CCNC: 12 U/L (ref 9–39)
BASOPHILS # BLD AUTO: 0.05 X10*3/UL (ref 0–0.1)
BASOPHILS NFR BLD AUTO: 1.2 %
BILIRUB SERPL-MCNC: 0.5 MG/DL (ref 0–1.2)
BUN SERPL-MCNC: 14 MG/DL (ref 6–23)
CALCIUM SERPL-MCNC: 8.9 MG/DL (ref 8.6–10.3)
CHLORIDE SERPL-SCNC: 98 MMOL/L (ref 98–107)
CHOLEST SERPL-MCNC: 131 MG/DL (ref 0–199)
CHOLESTEROL/HDL RATIO: 2.9
CO2 SERPL-SCNC: 29 MMOL/L (ref 21–32)
CREAT SERPL-MCNC: 0.73 MG/DL (ref 0.5–1.05)
EOSINOPHIL # BLD AUTO: 0.12 X10*3/UL (ref 0–0.4)
EOSINOPHIL NFR BLD AUTO: 2.8 %
ERYTHROCYTE [DISTWIDTH] IN BLOOD BY AUTOMATED COUNT: 12.6 % (ref 11.5–14.5)
GFR SERPL CREATININE-BSD FRML MDRD: 79 ML/MIN/1.73M*2
GLUCOSE SERPL-MCNC: 98 MG/DL (ref 74–99)
HCT VFR BLD AUTO: 35.6 % (ref 36–46)
HDLC SERPL-MCNC: 45.4 MG/DL
HGB BLD-MCNC: 11.6 G/DL (ref 12–16)
IMM GRANULOCYTES # BLD AUTO: 0.01 X10*3/UL (ref 0–0.5)
IMM GRANULOCYTES NFR BLD AUTO: 0.2 % (ref 0–0.9)
LDLC SERPL CALC-MCNC: 61 MG/DL
LYMPHOCYTES # BLD AUTO: 1.17 X10*3/UL (ref 0.8–3)
LYMPHOCYTES NFR BLD AUTO: 27.4 %
MCH RBC QN AUTO: 29.9 PG (ref 26–34)
MCHC RBC AUTO-ENTMCNC: 32.6 G/DL (ref 32–36)
MCV RBC AUTO: 92 FL (ref 80–100)
MONOCYTES # BLD AUTO: 0.33 X10*3/UL (ref 0.05–0.8)
MONOCYTES NFR BLD AUTO: 7.7 %
NEUTROPHILS # BLD AUTO: 2.59 X10*3/UL (ref 1.6–5.5)
NEUTROPHILS NFR BLD AUTO: 60.7 %
NON HDL CHOLESTEROL: 86 MG/DL (ref 0–149)
NRBC BLD-RTO: 0 /100 WBCS (ref 0–0)
PLATELET # BLD AUTO: 318 X10*3/UL (ref 150–450)
POTASSIUM SERPL-SCNC: 4.2 MMOL/L (ref 3.5–5.3)
PROT SERPL-MCNC: 6.7 G/DL (ref 6.4–8.2)
RBC # BLD AUTO: 3.88 X10*6/UL (ref 4–5.2)
SODIUM SERPL-SCNC: 135 MMOL/L (ref 136–145)
T4 FREE SERPL-MCNC: 2.05 NG/DL (ref 0.61–1.12)
TRIGL SERPL-MCNC: 123 MG/DL (ref 0–149)
TSH SERPL-ACNC: 2.87 MIU/L (ref 0.44–3.98)
VLDL: 25 MG/DL (ref 0–40)
WBC # BLD AUTO: 4.3 X10*3/UL (ref 4.4–11.3)

## 2023-12-01 PROCEDURE — 80053 COMPREHEN METABOLIC PANEL: CPT

## 2023-12-01 PROCEDURE — 36415 COLL VENOUS BLD VENIPUNCTURE: CPT

## 2023-12-01 PROCEDURE — 85025 COMPLETE CBC W/AUTO DIFF WBC: CPT

## 2023-12-01 PROCEDURE — 84439 ASSAY OF FREE THYROXINE: CPT

## 2023-12-01 PROCEDURE — 80061 LIPID PANEL: CPT

## 2023-12-01 PROCEDURE — 83036 HEMOGLOBIN GLYCOSYLATED A1C: CPT

## 2023-12-01 PROCEDURE — 84443 ASSAY THYROID STIM HORMONE: CPT

## 2023-12-02 LAB
EST. AVERAGE GLUCOSE BLD GHB EST-MCNC: 111 MG/DL
HBA1C MFR BLD: 5.5 %

## 2023-12-04 ENCOUNTER — OFFICE VISIT (OUTPATIENT)
Dept: PRIMARY CARE | Facility: CLINIC | Age: 88
End: 2023-12-04
Payer: MEDICARE

## 2023-12-04 VITALS
BODY MASS INDEX: 22.66 KG/M2 | TEMPERATURE: 97.7 F | DIASTOLIC BLOOD PRESSURE: 64 MMHG | SYSTOLIC BLOOD PRESSURE: 112 MMHG | HEIGHT: 65 IN | HEART RATE: 88 BPM | OXYGEN SATURATION: 96 % | RESPIRATION RATE: 14 BRPM | WEIGHT: 136 LBS

## 2023-12-04 DIAGNOSIS — E11.22 TYPE 2 DIABETES MELLITUS WITH STAGE 3A CHRONIC KIDNEY DISEASE, WITHOUT LONG-TERM CURRENT USE OF INSULIN (MULTI): Primary | ICD-10-CM

## 2023-12-04 DIAGNOSIS — F41.1 GENERALIZED ANXIETY DISORDER: ICD-10-CM

## 2023-12-04 DIAGNOSIS — N18.31 TYPE 2 DIABETES MELLITUS WITH STAGE 3A CHRONIC KIDNEY DISEASE, WITHOUT LONG-TERM CURRENT USE OF INSULIN (MULTI): Primary | ICD-10-CM

## 2023-12-04 DIAGNOSIS — E03.9 ACQUIRED HYPOTHYROIDISM: ICD-10-CM

## 2023-12-04 DIAGNOSIS — R63.0 ANOREXIA: ICD-10-CM

## 2023-12-04 DIAGNOSIS — N18.31 CHRONIC KIDNEY DISEASE, STAGE 3A (MULTI): ICD-10-CM

## 2023-12-04 DIAGNOSIS — I10 ESSENTIAL HYPERTENSION: ICD-10-CM

## 2023-12-04 DIAGNOSIS — D64.9 CHRONIC ANEMIA: ICD-10-CM

## 2023-12-04 DIAGNOSIS — E78.2 MIXED HYPERLIPIDEMIA: ICD-10-CM

## 2023-12-04 PROCEDURE — 1036F TOBACCO NON-USER: CPT | Performed by: FAMILY MEDICINE

## 2023-12-04 PROCEDURE — 3078F DIAST BP <80 MM HG: CPT | Performed by: FAMILY MEDICINE

## 2023-12-04 PROCEDURE — 1159F MED LIST DOCD IN RCRD: CPT | Performed by: FAMILY MEDICINE

## 2023-12-04 PROCEDURE — 1125F AMNT PAIN NOTED PAIN PRSNT: CPT | Performed by: FAMILY MEDICINE

## 2023-12-04 PROCEDURE — 1160F RVW MEDS BY RX/DR IN RCRD: CPT | Performed by: FAMILY MEDICINE

## 2023-12-04 PROCEDURE — 3074F SYST BP LT 130 MM HG: CPT | Performed by: FAMILY MEDICINE

## 2023-12-04 PROCEDURE — 99214 OFFICE O/P EST MOD 30 MIN: CPT | Performed by: FAMILY MEDICINE

## 2023-12-04 RX ORDER — MIRTAZAPINE 15 MG/1
15 TABLET, FILM COATED ORAL NIGHTLY
Qty: 30 TABLET | Refills: 3 | Status: SHIPPED | OUTPATIENT
Start: 2023-12-04 | End: 2024-03-04 | Stop reason: ALTCHOICE

## 2023-12-04 RX ORDER — ALPRAZOLAM 1 MG/1
1 TABLET ORAL 2 TIMES DAILY PRN
Qty: 60 TABLET | Refills: 1 | Status: SHIPPED | OUTPATIENT
Start: 2023-12-04 | End: 2024-03-04 | Stop reason: SDUPTHER

## 2023-12-04 ASSESSMENT — ANXIETY QUESTIONNAIRES
7. FEELING AFRAID AS IF SOMETHING AWFUL MIGHT HAPPEN: NOT AT ALL
GAD7 TOTAL SCORE: 2
4. TROUBLE RELAXING: NOT AT ALL
IF YOU CHECKED OFF ANY PROBLEMS ON THIS QUESTIONNAIRE, HOW DIFFICULT HAVE THESE PROBLEMS MADE IT FOR YOU TO DO YOUR WORK, TAKE CARE OF THINGS AT HOME, OR GET ALONG WITH OTHER PEOPLE: NOT DIFFICULT AT ALL
2. NOT BEING ABLE TO STOP OR CONTROL WORRYING: NOT AT ALL
5. BEING SO RESTLESS THAT IT IS HARD TO SIT STILL: NOT AT ALL
1. FEELING NERVOUS, ANXIOUS, OR ON EDGE: SEVERAL DAYS
3. WORRYING TOO MUCH ABOUT DIFFERENT THINGS: NOT AT ALL
6. BECOMING EASILY ANNOYED OR IRRITABLE: SEVERAL DAYS

## 2023-12-04 ASSESSMENT — ENCOUNTER SYMPTOMS
POLYDIPSIA: 0
WEIGHT LOSS: 0
BLURRED VISION: 0
IRRITABILITY: 1
FEELING OF CHOKING: 0
FATIGUE: 0
PANIC: 0
MALAISE: 0
WEAKNESS: 0
VISUAL CHANGE: 0
DEPRESSED MOOD: 0
CONFUSION: 0
POLYPHAGIA: 0
MUSCLE TENSION: 0
THOUGHT CONTENT - OBSESSIONS: 0
PALPITATIONS: 0
SHORTNESS OF BREATH: 0
HYPERVENTILATION: 0
NAUSEA: 0
INSOMNIA: 0
RESTLESSNESS: 1
DECREASED CONCENTRATION: 1
DIZZINESS: 0
COMPULSIONS: 0

## 2023-12-04 NOTE — ASSESSMENT & PLAN NOTE
Diabetes Mellitus type II, under excellent control.  1. Rx changes: None  2. Education: Reviewed ‘ABCs’ of diabetes management (respective goals in parentheses):  A1C (<7), blood pressure (<130/80), and cholesterol (LDL <100).  3. Compliance at present is estimated to be excellent. Efforts to improve compliance (if necessary) will be directed at dietary modifications: and increased exercise.  4. Follow up: 3 months

## 2023-12-04 NOTE — ASSESSMENT & PLAN NOTE
Order placed and message routed back to Rodney Parmar.    Stable, continue current medications and management.

## 2023-12-04 NOTE — PROGRESS NOTES
Chief Complaint   Patient presents with    Follow-up     Diabetes, Hypertension, Hyperlipidemia, Hypothyroidism, Anxiety and labs       Subjective   Patient ID: Katelyn Weiss is a 88 y.o. female who presents for Follow-up (Diabetes, Hypertension, Hyperlipidemia, Hypothyroidism, Anxiety and labs).    Patient is here for follow-up on hypertension and hyperlipidemia. She has no chest pain, dyspnea, exertional chest pressure/discomfort, near-syncope, orthopnea, palpitations, paroxysmal nocturnal dyspnea, and syncope. Taking her medication regularly with no side effects.    Anxiety  Presents for follow-up visit. Symptoms include decreased concentration, irritability and restlessness. Patient reports no chest pain, compulsions, confusion, depressed mood, dizziness, dry mouth, excessive worry, feeling of choking, hyperventilation, impotence, insomnia, malaise, muscle tension, nausea, obsessions, palpitations, panic, shortness of breath or suicidal ideas. Symptoms occur occasionally. The most recent episode lasted 2 hours. The severity of symptoms is mild. The patient sleeps 8 hours per night. The quality of sleep is good. Nighttime awakenings: occasional.     Compliance with medications is %.   Diabetes  She presents for her follow-up diabetic visit. She has type 2 diabetes mellitus. Her disease course has been stable. There are no hypoglycemic associated symptoms. Pertinent negatives for hypoglycemia include no confusion or dizziness. Pertinent negatives for diabetes include no blurred vision, no chest pain, no fatigue, no foot paresthesias, no foot ulcerations, no polydipsia, no polyphagia, no polyuria, no visual change, no weakness and no weight loss. Pertinent negatives for diabetic complications include no impotence. Risk factors for coronary artery disease include diabetes mellitus, dyslipidemia and hypertension.        Review of Systems   Constitutional:  Positive for irritability. Negative for fatigue and  "weight loss.   Eyes:  Negative for blurred vision.   Respiratory:  Negative for shortness of breath.    Cardiovascular:  Negative for chest pain and palpitations.   Gastrointestinal:  Negative for nausea.   Endocrine: Negative for polydipsia, polyphagia and polyuria.   Genitourinary:  Negative for impotence.   Neurological:  Negative for dizziness and weakness.   Psychiatric/Behavioral:  Positive for decreased concentration. Negative for confusion and suicidal ideas. The patient does not have insomnia.        Objective   /64   Pulse 88   Temp 36.5 °C (97.7 °F)   Resp 14   Ht 1.651 m (5' 5\")   Wt 61.7 kg (136 lb)   SpO2 96%   BMI 22.63 kg/m²     Physical Exam  Constitutional:       General: She is not in acute distress.     Appearance: Normal appearance.   HENT:      Head: Normocephalic and atraumatic.      Mouth/Throat:      Mouth: Mucous membranes are moist.      Pharynx: Oropharynx is clear. No oropharyngeal exudate or posterior oropharyngeal erythema.   Eyes:      General: No scleral icterus.     Extraocular Movements: Extraocular movements intact.      Pupils: Pupils are equal, round, and reactive to light.   Cardiovascular:      Rate and Rhythm: Normal rate and regular rhythm.      Pulses: Normal pulses.      Heart sounds: No murmur heard.     No friction rub. No gallop.   Pulmonary:      Effort: Pulmonary effort is normal.      Breath sounds: No wheezing, rhonchi or rales.   Skin:     General: Skin is warm.      Coloration: Skin is not jaundiced or pale.   Neurological:      General: No focal deficit present.      Mental Status: She is alert and oriented to person, place, and time.       Lab on 12/01/2023   Component Date Value Ref Range Status    WBC 12/01/2023 4.3 (L)  4.4 - 11.3 x10*3/uL Final    nRBC 12/01/2023 0.0  0.0 - 0.0 /100 WBCs Final    RBC 12/01/2023 3.88 (L)  4.00 - 5.20 x10*6/uL Final    Hemoglobin 12/01/2023 11.6 (L)  12.0 - 16.0 g/dL Final    Hematocrit 12/01/2023 35.6 (L)  36.0 - " 46.0 % Final    MCV 12/01/2023 92  80 - 100 fL Final    MCH 12/01/2023 29.9  26.0 - 34.0 pg Final    MCHC 12/01/2023 32.6  32.0 - 36.0 g/dL Final    RDW 12/01/2023 12.6  11.5 - 14.5 % Final    Platelets 12/01/2023 318  150 - 450 x10*3/uL Final    Neutrophils % 12/01/2023 60.7  40.0 - 80.0 % Final    Immature Granulocytes %, Automated 12/01/2023 0.2  0.0 - 0.9 % Final    Immature Granulocyte Count (IG) includes promyelocytes, myelocytes and metamyelocytes but does not include bands. Percent differential counts (%) should be interpreted in the context of the absolute cell counts (cells/UL).    Lymphocytes % 12/01/2023 27.4  13.0 - 44.0 % Final    Monocytes % 12/01/2023 7.7  2.0 - 10.0 % Final    Eosinophils % 12/01/2023 2.8  0.0 - 6.0 % Final    Basophils % 12/01/2023 1.2  0.0 - 2.0 % Final    Neutrophils Absolute 12/01/2023 2.59  1.60 - 5.50 x10*3/uL Final    Percent differential counts (%) should be interpreted in the context of the absolute cell counts (cells/uL).    Immature Granulocytes Absolute, Au* 12/01/2023 0.01  0.00 - 0.50 x10*3/uL Final    Lymphocytes Absolute 12/01/2023 1.17  0.80 - 3.00 x10*3/uL Final    Monocytes Absolute 12/01/2023 0.33  0.05 - 0.80 x10*3/uL Final    Eosinophils Absolute 12/01/2023 0.12  0.00 - 0.40 x10*3/uL Final    Basophils Absolute 12/01/2023 0.05  0.00 - 0.10 x10*3/uL Final    Hemoglobin A1C 12/01/2023 5.5  see below % Final    Estimated Average Glucose 12/01/2023 111  Not Established mg/dL Final    Glucose 12/01/2023 98  74 - 99 mg/dL Final    Sodium 12/01/2023 135 (L)  136 - 145 mmol/L Final    Potassium 12/01/2023 4.2  3.5 - 5.3 mmol/L Final    Chloride 12/01/2023 98  98 - 107 mmol/L Final    Bicarbonate 12/01/2023 29  21 - 32 mmol/L Final    Anion Gap 12/01/2023 12  10 - 20 mmol/L Final    Urea Nitrogen 12/01/2023 14  6 - 23 mg/dL Final    Creatinine 12/01/2023 0.73  0.50 - 1.05 mg/dL Final    eGFR 12/01/2023 79  >60 mL/min/1.73m*2 Final    Calculations of estimated GFR are  Quality 431: Preventive Care And Screening: Unhealthy Alcohol Use - Screening: Patient not identified as an unhealthy alcohol user when screened for unhealthy alcohol use using a systematic screening method performed using the 2021 CKD-EPI Study Refit equation without the race variable for the IDMS-Traceable creatinine methods.  https://jasn.asnjournals.org/content/early/2021/09/22/ASN.9555962187    Calcium 12/01/2023 8.9  8.6 - 10.3 mg/dL Final    Albumin 12/01/2023 4.2  3.4 - 5.0 g/dL Final    Alkaline Phosphatase 12/01/2023 89  33 - 136 U/L Final    Total Protein 12/01/2023 6.7  6.4 - 8.2 g/dL Final    AST 12/01/2023 12  9 - 39 U/L Final    Bilirubin, Total 12/01/2023 0.5  0.0 - 1.2 mg/dL Final    ALT 12/01/2023 7  7 - 45 U/L Final    Patients treated with Sulfasalazine may generate falsely decreased results for ALT.    Cholesterol 12/01/2023 131  0 - 199 mg/dL Final          Age      Desirable   Borderline High   High     0-19 Y     0 - 169       170 - 199     >/= 200    20-24 Y     0 - 189       190 - 224     >/= 225         >24 Y     0 - 199       200 - 239     >/= 240   **All ranges are based on fasting samples. Specific   therapeutic targets will vary based on patient-specific   cardiac risk.    Pediatric guidelines reference:Pediatrics 2011, 128(S5).Adult guidelines reference: NCEP ATPIII Guidelines,GABRIELLE 2001, 258:2486-97    Venipuncture immediately after or during the administration of Metamizole may lead to falsely low results. Testing should be performed immediately prior to Metamizole dosing.    HDL-Cholesterol 12/01/2023 45.4  mg/dL Final      Age       Very Low   Low     Normal    High    0-19 Y    < 35      < 40     40-45     ----  20-24 Y    ----     < 40      >45      ----        >24 Y      ----     < 40     40-60      >60      Cholesterol/HDL Ratio 12/01/2023 2.9   Final      Ref Values  Desirable  < 3.4  High Risk  > 5.0    LDL Calculated 12/01/2023 61  <=99 mg/dL Final                                Near   Borderline      AGE      Desirable  Optimal    High     High     Very High     0-19 Y     0 - 109     ---    110-129   >/= 130     ----    20-24 Y     0 - 119     ---    120-159   >/= 160      ----      >24 Y     0 -  99   100-129  130-159   160-189     >/=190      VLDL 12/01/2023 25  0 - 40 mg/dL Final    Triglycerides 12/01/2023 123  0 - 149 mg/dL Final       Age         Desirable   Borderline High   High     Very High   0 D-90 D    19 - 174         ----         ----        ----  91 D- 9 Y     0 -  74        75 -  99     >/= 100      ----    10-19 Y     0 -  89        90 - 129     >/= 130      ----    20-24 Y     0 - 114       115 - 149     >/= 150      ----         >24 Y     0 - 149       150 - 199    200- 499    >/= 500    Venipuncture immediately after or during the administration of Metamizole may lead to falsely low results. Testing should be performed immediately prior to Metamizole dosing.    Non HDL Cholesterol 12/01/2023 86  0 - 149 mg/dL Final          Age       Desirable   Borderline High   High     Very High     0-19 Y     0 - 119       120 - 144     >/= 145    >/= 160    20-24 Y     0 - 149       150 - 189     >/= 190      ----         >24 Y    30 mg/dL above LDL Cholesterol goal      Thyroid Stimulating Hormone 12/01/2023 2.87  0.44 - 3.98 mIU/L Final    Thyroxine, Free 12/01/2023 2.05 (H)  0.61 - 1.12 ng/dL Final      Assessment/Plan   Problem List Items Addressed This Visit       Acquired hypothyroidism     Well-controlled, continue current medications and management.         Relevant Orders    Follow Up In [facility] - PCP - Established    Thyroid Stimulating Hormone    Thyroxine, Free    Chronic anemia     Stable, continue current medications and management.         Chronic kidney disease, stage 3a (CMS/HCC)     Well-controlled, continue current medications and management.           Essential hypertension     Well-controlled, continue current medications and management.         Relevant Orders    Follow Up In [facility] - PCP - Established    CBC and Auto Differential    Comprehensive Metabolic Panel    Hemoglobin A1C    Lipid Panel    Generalized anxiety disorder  Detail Level: Detailed     Stable, continue current medications and management.         Relevant Medications    ALPRAZolam (Xanax) 1 mg tablet    mirtazapine (Remeron) 15 mg tablet    Other Relevant Orders    Follow Up In [facility] - PCP - Established    Mixed hyperlipidemia     The nature of cardiac risk has been fully discussed with this patient. Discussed cardiovascular risk analysis and appropriate diet with the need for lifelong measures to reduce the risk. A regular exercise program is recommended to help achieve and maintain normal body weight, fitness and improve lipid balance. Patient education provided. They understand and agree with this course of treatment. They will return with new or worsening symptoms. Patient instructed to remain current with appropriate annual health maintenance.          Relevant Orders    Follow Up In [facility] - PCP - Established    CBC and Auto Differential    Comprehensive Metabolic Panel    Hemoglobin A1C    Lipid Panel    Type 2 diabetes mellitus with stage 3a chronic kidney disease, without long-term current use of insulin (CMS/[facility]) - Primary     Diabetes Mellitus type II, under excellent control.  1. Rx changes: None  2. Education: Reviewed ‘ABCs’ of diabetes management (respective goals in parentheses):  A1C (<7), blood pressure (<130/80), and cholesterol (LDL <100).  3. Compliance at present is estimated to be excellent. Efforts to improve compliance (if necessary) will be directed at dietary modifications: and increased exercise.  4. Follow up: 3 months         Relevant Orders    Follow Up In [facility] - PCP - Established    CBC and Auto Differential    Comprehensive Metabolic Panel    Hemoglobin A1C    Lipid Panel    Anorexia     Stable, continue current medications and management.         Relevant Medications    mirtazapine (Remeron) 15 mg tablet     Lab and other ancillary tests ordered as above.  See current orders.   Lab results and medications were  Quality 130: Documentation Of Current Medications In The Medical Record: Current Medications Documented Quality 226: Preventive Care And Screening: Tobacco Use: Screening And Cessation Intervention: Patient screened for tobacco use and is an ex/non-smoker discussed update refills on the medications.   The risks benefits and side effects of the medications were also discussed  All questions addressed.    OARRS:  Darius Quezada MD on 12/4/2023  1:59 PM  I have personally reviewed the OARRS report for Katelyn Weiss. I have considered the risks of abuse, dependence, addiction and diversion    Is the patient prescribed a combination of a benzodiazepine and opioid?  No    Last Urine Drug Screen / ordered today: No  Recent Results (from the past 8760 hour(s))   OPIATE/OPIOID/BENZO PRESCRIPTION COMPLIANCE    Collection Time: 09/11/23 12:21 PM   Result Value Ref Range    DRUG SCREEN COMMENT URINE SEE BELOW     Creatine, Urine 129.0 mg/dL    Amphetamine Screen, Urine PRESUMPTIVE NEGATIVE NEGATIVE    Barbiturate Screen, Urine PRESUMPTIVE NEGATIVE NEGATIVE    Cannabinoid Screen, Urine PRESUMPTIVE NEGATIVE NEGATIVE    Cocaine Screen, Urine PRESUMPTIVE NEGATIVE NEGATIVE    PCP Screen, Urine PRESUMPTIVE NEGATIVE NEGATIVE    7-Aminoclonazepam <25 Cutoff <25 ng/mL    Alpha-Hydroxyalprazolam 805 (A) Cutoff <25 ng/mL    Alpha-Hydroxymidazolam <25 Cutoff <25 ng/mL    Alprazolam 269 (A) Cutoff <25 ng/mL    Chlordiazepoxide <25 Cutoff <25 ng/mL    Clonazepam <25 Cutoff <25 ng/mL    Diazepam <25 Cutoff <25 ng/mL    Lorazepam <25 Cutoff <25 ng/mL    Midazolam <25 Cutoff <25 ng/mL    Nordiazepam <25 Cutoff <25 ng/mL    Oxazepam <25 Cutoff <25 ng/mL    Temazepam <25 Cutoff <25 ng/mL    Zolpidem <25 Cutoff <25 ng/mL    Zolpidem Metabolite (ZCA) <25 Cutoff <25 ng/mL    6-Acetylmorphine <25 Cutoff <25 ng/mL    Codeine <50 Cutoff <50 ng/mL    Hydrocodone <25 Cutoff <25 ng/mL    Hydromorphone <25 Cutoff <25 ng/mL    Morphine Urine <50 Cutoff <50 ng/mL    Norhydrocodone <25 Cutoff <25 ng/mL    Noroxycodone <25 Cutoff <25 ng/mL    Oxycodone <25 Cutoff <25 ng/mL    Oxymorphone <25 Cutoff <25 ng/mL    Tramadol <50 Cutoff <50 ng/mL    O-Desmethyltramadol <50 Cutoff <50 ng/mL    Fentanyl <2.5  Cutoff<2.5 ng/mL    Norfentanyl <2.5 Cutoff<2.5 ng/mL    METHADONE CONFIRMATION,URINE <25 Cutoff <25 ng/mL    EDDP <25 Cutoff <25 ng/mL     Controlled Substance Agreement:  Date of the Last Agreement: 3/9/2023  Reviewed Controlled Substance Agreement including but not limited to the benefits, risks, and alternatives to treatment with a Controlled Substance medication(s).    Benzodiazepines:  What is the patient's goal of therapy? Able to function without being unduly anxious and carry out day-to-day activities without tension and panic attacks    Is this being achieved with current treatment? yes    JOSE-7:  Over the last 2 weeks, how often have you been bothered by any of the following problems?  Feeling nervous, anxious, or on edge: 1  Not being able to stop or control worryin  Worrying too much about different things: 0  Trouble relaxin  Being so restless that it is hard to sit still: 0  Becoming easily annoyed or irritable: 1  Feeling afraid as if something awful might happen: 0  JOSE-7 Total Score: 2    Activities of Daily Living:   Is your overall impression that this patient is benefiting (symptom reduction outweighs side effects) from benzodiazepine therapy? Yes     1. Physical Functioning: Better  2. Family Relationship: Better  3. Social Relationship: Better  4. Mood: Better  5. Sleep Patterns: Better  6. Overall Function: Better    Scribe Attestation  By signing my name below, IEze Scribe   attest that this documentation has been prepared under the direction and in the presence of Darius Quezada MD.   normal...

## 2023-12-13 DIAGNOSIS — J06.9 ACUTE URI: ICD-10-CM

## 2023-12-13 DIAGNOSIS — J20.9 ACUTE BRONCHITIS, UNSPECIFIED ORGANISM: ICD-10-CM

## 2023-12-13 RX ORDER — AZITHROMYCIN 250 MG/1
TABLET, FILM COATED ORAL
Qty: 6 TABLET | Refills: 0 | Status: SHIPPED | OUTPATIENT
Start: 2023-12-13 | End: 2023-12-18

## 2023-12-13 NOTE — TELEPHONE ENCOUNTER
The patient was recently seen in office on 12/4/2023.   By Thursday 12/7/2023 she started with symptoms of feeling sick.   The patient's daughter Kayla would like to have treatment sent to the pharmacy.  Her following symptoms are indicated below.  -hoarseness of voice/ throat  -nasal drainage  -yellow mucous  -productive cough  -negative for fever  Per her daughter Kayla, she has COPD and do not want this to escalate into much severe symptom's.  [facility] [place].  She may be contacted at [phone]

## 2023-12-13 NOTE — TELEPHONE ENCOUNTER
Per Dr. Damien phillip to pend a zpack for the patient and instruct her to get Mucinex DM OTC to take with it.    Rx pended    Patients daughter aware

## 2023-12-21 DIAGNOSIS — E03.9 ACQUIRED HYPOTHYROIDISM: ICD-10-CM

## 2023-12-21 RX ORDER — LEVOTHYROXINE SODIUM 50 UG/1
50 TABLET ORAL
Qty: 90 TABLET | Refills: 1 | Status: SHIPPED | OUTPATIENT
Start: 2023-12-21

## 2023-12-21 NOTE — TELEPHONE ENCOUNTER
Rx Refill Request     Name: Katelyn Weiss  :  1935   Medication Name:  levothyroxine   Specific Pharmacy location:  [facility]  Date of last appointment:  2023   Date of next appointment:  3/4/2024   Best number to reach patient:  [phone]

## 2023-12-26 ENCOUNTER — HOSPITAL ENCOUNTER (EMERGENCY)
Facility: HOSPITAL | Age: 88
Discharge: HOME | End: 2023-12-26
Payer: MEDICARE

## 2023-12-26 ENCOUNTER — APPOINTMENT (OUTPATIENT)
Dept: RADIOLOGY | Facility: HOSPITAL | Age: 88
End: 2023-12-26
Payer: MEDICARE

## 2023-12-26 VITALS
TEMPERATURE: 97.9 F | OXYGEN SATURATION: 97 % | HEART RATE: 76 BPM | BODY MASS INDEX: 21.66 KG/M2 | SYSTOLIC BLOOD PRESSURE: 192 MMHG | DIASTOLIC BLOOD PRESSURE: 84 MMHG | WEIGHT: 130 LBS | RESPIRATION RATE: 17 BRPM | HEIGHT: 65 IN

## 2023-12-26 DIAGNOSIS — S60.212A CONTUSION OF LEFT WRIST, INITIAL ENCOUNTER: ICD-10-CM

## 2023-12-26 DIAGNOSIS — M15.4: ICD-10-CM

## 2023-12-26 DIAGNOSIS — S60.222A CONTUSION OF LEFT HAND, INITIAL ENCOUNTER: Primary | ICD-10-CM

## 2023-12-26 PROCEDURE — 73110 X-RAY EXAM OF WRIST: CPT | Mod: LT

## 2023-12-26 PROCEDURE — 73130 X-RAY EXAM OF HAND: CPT | Mod: LT

## 2023-12-26 PROCEDURE — 73130 X-RAY EXAM OF HAND: CPT | Mod: LEFT SIDE | Performed by: RADIOLOGY

## 2023-12-26 PROCEDURE — 73110 X-RAY EXAM OF WRIST: CPT | Mod: LEFT SIDE | Performed by: RADIOLOGY

## 2023-12-26 PROCEDURE — 99284 EMERGENCY DEPT VISIT MOD MDM: CPT

## 2023-12-26 PROCEDURE — 99284 EMERGENCY DEPT VISIT MOD MDM: CPT | Mod: 25 | Performed by: PHYSICIAN ASSISTANT

## 2023-12-26 RX ORDER — ACETAMINOPHEN 325 MG/1
650 TABLET ORAL EVERY 6 HOURS PRN
Qty: 30 TABLET | Refills: 0 | Status: SHIPPED | OUTPATIENT
Start: 2023-12-26 | End: 2024-01-12

## 2023-12-26 RX ORDER — ACETAMINOPHEN 325 MG/1
650 TABLET ORAL ONCE
Status: COMPLETED | OUTPATIENT
Start: 2023-12-26 | End: 2023-12-26

## 2023-12-26 RX ORDER — MELOXICAM 15 MG/1
15 TABLET ORAL DAILY
Qty: 30 TABLET | Refills: 0 | Status: SHIPPED | OUTPATIENT
Start: 2023-12-26 | End: 2024-12-25

## 2023-12-26 RX ADMIN — ACETAMINOPHEN 650 MG: 325 TABLET ORAL at 01:11

## 2023-12-26 ASSESSMENT — COLUMBIA-SUICIDE SEVERITY RATING SCALE - C-SSRS
2. HAVE YOU ACTUALLY HAD ANY THOUGHTS OF KILLING YOURSELF?: NO
6. HAVE YOU EVER DONE ANYTHING, STARTED TO DO ANYTHING, OR PREPARED TO DO ANYTHING TO END YOUR LIFE?: NO
1. IN THE PAST MONTH, HAVE YOU WISHED YOU WERE DEAD OR WISHED YOU COULD GO TO SLEEP AND NOT WAKE UP?: NO

## 2023-12-26 ASSESSMENT — LIFESTYLE VARIABLES
HAVE YOU EVER FELT YOU SHOULD CUT DOWN ON YOUR DRINKING: NO
HAVE PEOPLE ANNOYED YOU BY CRITICIZING YOUR DRINKING: NO
EVER HAD A DRINK FIRST THING IN THE MORNING TO STEADY YOUR NERVES TO GET RID OF A HANGOVER: NO
EVER FELT BAD OR GUILTY ABOUT YOUR DRINKING: NO
REASON UNABLE TO ASSESS: NO

## 2023-12-26 ASSESSMENT — PAIN - FUNCTIONAL ASSESSMENT: PAIN_FUNCTIONAL_ASSESSMENT: 0-10

## 2023-12-26 NOTE — ED PROVIDER NOTES
HPI   Chief Complaint   Patient presents with   • Hand Injury       This is a pleasant 88-year-old female who presents to the emergency department chief complaint of acute injury to her left hand and left wrist.  The injury occurred at home earlier today when she was dusting.  The patient states that she accidentally struck her hand and wrist against a piece of furniture.  Since then she has been experiencing sharp stabbing wrist and hand pain with swelling.  The pain is a constant pain is worse with movement and there are no alleviating factors.  The patient did not take any medications for his symptoms.  The patient was brought in from home by family member and the patient is the primary historian.      History provided by:  Patient and relative   used: No                        No data recorded                Patient History   Past Medical History:   Diagnosis Date   • Acute sinusitis, unspecified 04/24/2019    Acute non-recurrent sinusitis, unspecified location   • Encounter for general adult medical examination without abnormal findings 02/19/2020    Routine general medical examination at health care facility   • Personal history of other endocrine, nutritional and metabolic disease     History of thyroid disorder     Past Surgical History:   Procedure Laterality Date   • MR CHEST ANGIO W IV CONTRAST  12/5/2014    MR CHEST ANGIO W IV CONTRAST 12/5/2014 [facility]LL[facility]   • OTHER SURGICAL HISTORY  02/27/2019    Umbilical hernia repair     Family History   Problem Relation Name Age of Onset   • Aneurysm Father     • Colon cancer Daughter       Social History     Tobacco Use   • Smoking status: Never   • Smokeless tobacco: Never   Vaping Use   • Vaping Use: Never used   Substance Use Topics   • Alcohol use: Never   • Drug use: Never       Physical Exam   ED Triage Vitals [12/26/23 0039]   Temp Heart Rate Resp BP   36.6 °C (97.9 °F) 90 17 (!) 197/91      SpO2 Temp Source Heart Rate Source  Patient Position   97 % Temporal Monitor Sitting      BP Location FiO2 (%)     -- --       Physical Exam  Vitals and nursing note reviewed.   Constitutional:       Appearance: Normal appearance. She is not ill-appearing or toxic-appearing.   Musculoskeletal:         General: Swelling, tenderness and signs of injury present.      Left wrist: Swelling and bony tenderness present. Decreased range of motion.        Arms:       Comments: The left wrist shows some swelling with bruising Over the distal radius and ulna, she has limited flexion extension as well as supination pronation.  There are no open wounds.  She has intact distal pulses.   Skin:     General: Skin is warm.      Capillary Refill: Capillary refill takes less than 2 seconds.      Findings: Bruising present.             Comments: There is bruising to the dorsal aspect of the left wrist over the radius and ulna.  There is no snuffbox tenderness.  No open wounds.   Neurological:      General: No focal deficit present.      Mental Status: She is alert and oriented to person, place, and time. Mental status is at baseline.   Psychiatric:         Mood and Affect: Mood normal.         Behavior: Behavior normal.         Thought Content: Thought content normal.         Judgment: Judgment normal.         ED Course & MDM   ED Course as of 12/26/23 0226   Tue Dec 26, 2023   0223 Xray of the left wrist and hand shows no radiographic fracture, there is soft tissue swelling about the wrist most pronounced along the dorsal aspect, degenerative changes noted throughout the wrist and hand and findings suggestive of calcium pyrophosphate deposition, possible central erosion of the distal third interphalangeal joint which could reflect erosive osteoarthritis.  Borderline widening of the scapholunate interval which may represent ligamentous injury age-indeterminate.  I rounded on the patient to discuss results of the workup.  I placed a Castro wrap on the patient's wrist myself  using cast padding and an Ace wrap.  Patient was then discharged home with prescription for Tylenol, Mobic and [facility] initial impression is acute contusion to the left wrist and left hand as well as degenerative arthritis of the left wrist.  The patient shows no signs of infected joint there are no open wounds.  Patient was advised to return with any concerns or worsening of symptoms all questions answered prior to discharge [RS]      ED Course User Index  [RS] Jose L Mejias PA-C         Diagnoses as of 12/26/23 0226   Contusion of left hand, initial encounter   Contusion of left wrist, initial encounter   Erosive osteoarthritis of left wrist       Medical Decision Making  Xray of the left wrist and hand shows no radiographic fracture, there is soft tissue swelling about the wrist most pronounced along the dorsal aspect, degenerative changes noted throughout the wrist and hand and findings suggestive of calcium pyrophosphate deposition, possible central erosion of the distal third interphalangeal joint which could reflect erosive osteoarthritis.  Borderline widening of the scapholunate interval which may represent ligamentous injury age-indeterminate.  I rounded on the patient to discuss results of the workup.  I placed a Castro wrap on the patient's wrist myself using cast padding and an Ace wrap.  Patient was then discharged home with prescription for Tylenol, Mobic and [facility]up initial impression is acute contusion to the left wrist and left hand as well as degenerative arthritis of the left wrist.  The patient shows no signs of infected joint there are no open wounds.  Patient was advised to return with any concerns or worsening of symptoms all questions answered prior to discharge        Procedure  Procedures     Jose L Mejias PA-C  12/26/23 0227

## 2024-01-12 ENCOUNTER — OFFICE VISIT (OUTPATIENT)
Dept: PRIMARY CARE | Facility: CLINIC | Age: 89
End: 2024-01-12
Payer: MEDICARE

## 2024-01-12 VITALS
TEMPERATURE: 97.6 F | WEIGHT: 130 LBS | HEART RATE: 76 BPM | OXYGEN SATURATION: 97 % | BODY MASS INDEX: 21.66 KG/M2 | SYSTOLIC BLOOD PRESSURE: 118 MMHG | DIASTOLIC BLOOD PRESSURE: 66 MMHG | RESPIRATION RATE: 16 BRPM | HEIGHT: 65 IN

## 2024-01-12 DIAGNOSIS — S60.222D CONTUSION OF LEFT HAND, SUBSEQUENT ENCOUNTER: ICD-10-CM

## 2024-01-12 DIAGNOSIS — E11.22 TYPE 2 DIABETES MELLITUS WITH STAGE 3A CHRONIC KIDNEY DISEASE, WITHOUT LONG-TERM CURRENT USE OF INSULIN (MULTI): ICD-10-CM

## 2024-01-12 DIAGNOSIS — N18.31 CHRONIC KIDNEY DISEASE, STAGE 3A (MULTI): ICD-10-CM

## 2024-01-12 DIAGNOSIS — J44.9 CHRONIC OBSTRUCTIVE PULMONARY DISEASE, UNSPECIFIED COPD TYPE (MULTI): ICD-10-CM

## 2024-01-12 DIAGNOSIS — N18.31 TYPE 2 DIABETES MELLITUS WITH STAGE 3A CHRONIC KIDNEY DISEASE, WITHOUT LONG-TERM CURRENT USE OF INSULIN (MULTI): ICD-10-CM

## 2024-01-12 DIAGNOSIS — J44.1 ACUTE EXACERBATION OF CHRONIC OBSTRUCTIVE PULMONARY DISEASE (COPD) (MULTI): Primary | ICD-10-CM

## 2024-01-12 DIAGNOSIS — M15.4: ICD-10-CM

## 2024-01-12 DIAGNOSIS — S60.212D CONTUSION OF LEFT WRIST, SUBSEQUENT ENCOUNTER: ICD-10-CM

## 2024-01-12 PROBLEM — U07.1 SEVERE ACUTE RESPIRATORY SYNDROME CORONAVIRUS 2 (SARS-COV-2) DETECTED: Status: RESOLVED | Noted: 2023-01-25 | Resolved: 2024-01-12

## 2024-01-12 PROBLEM — S72.142A CLOSED DISPLACED INTERTROCHANTERIC FRACTURE OF LEFT FEMUR (MULTI): Status: RESOLVED | Noted: 2023-07-12 | Resolved: 2024-01-12

## 2024-01-12 PROBLEM — S60.222A CONTUSION OF LEFT HAND: Status: ACTIVE | Noted: 2024-01-12

## 2024-01-12 PROBLEM — S60.212A CONTUSION OF LEFT WRIST: Status: ACTIVE | Noted: 2024-01-12

## 2024-01-12 PROCEDURE — 1159F MED LIST DOCD IN RCRD: CPT | Performed by: FAMILY MEDICINE

## 2024-01-12 PROCEDURE — 3078F DIAST BP <80 MM HG: CPT | Performed by: FAMILY MEDICINE

## 2024-01-12 PROCEDURE — 1125F AMNT PAIN NOTED PAIN PRSNT: CPT | Performed by: FAMILY MEDICINE

## 2024-01-12 PROCEDURE — 99214 OFFICE O/P EST MOD 30 MIN: CPT | Performed by: FAMILY MEDICINE

## 2024-01-12 PROCEDURE — 3074F SYST BP LT 130 MM HG: CPT | Performed by: FAMILY MEDICINE

## 2024-01-12 PROCEDURE — 1036F TOBACCO NON-USER: CPT | Performed by: FAMILY MEDICINE

## 2024-01-12 RX ORDER — AZITHROMYCIN 250 MG/1
TABLET, FILM COATED ORAL
Qty: 6 TABLET | Refills: 0 | Status: SHIPPED | OUTPATIENT
Start: 2024-01-12 | End: 2024-01-17

## 2024-01-12 RX ORDER — PREDNISONE 10 MG/1
TABLET ORAL
Qty: 27 TABLET | Refills: 0 | Status: SHIPPED | OUTPATIENT
Start: 2024-01-12 | End: 2024-01-22

## 2024-01-12 RX ORDER — BENZONATATE 200 MG/1
200 CAPSULE ORAL 3 TIMES DAILY PRN
Qty: 30 CAPSULE | Refills: 0 | Status: SHIPPED | OUTPATIENT
Start: 2024-01-12 | End: 2024-01-22

## 2024-01-12 ASSESSMENT — ENCOUNTER SYMPTOMS
FEVER: 0
SORE THROAT: 0
WHEEZING: 1
COUGH: 1
CHILLS: 0
HEADACHES: 0

## 2024-01-12 ASSESSMENT — COPD QUESTIONNAIRES: COPD: 1

## 2024-01-12 NOTE — PROGRESS NOTES
"Chief Complaint   Patient presents with    ER Follow-up     12/26/23 Left Hand Injury    Cough       Subjective   Patient ID: Katelyn Weiss is a 88 y.o. female who presents for ER Follow-up (12/26/23 Left Hand Injury) and Cough.    She presents for follow up of left hand injury. Onset was 12/26/2023. Symptoms have been gradually improving. Current symptoms include left hand pain and bruising. Evaluation to date: X-Rays of the Left Hand and Wrist done in the Emergency Room on 12/26/2023. Treatment to date: Tylenol and Meloxicam.    She complains of her left leg numbness which is causing her to fall.     Cough  This is a new problem. The current episode started more than 1 month ago. The problem has been unchanged. The cough is Productive of sputum. Associated symptoms include nasal congestion, postnasal drip, shortness of breath and wheezing. Pertinent negatives include no chest pain, chills, ear pain, fever, headaches, rhinorrhea or sore throat. Nothing aggravates the symptoms. She has tried prescription cough suppressant, OTC cough suppressant and oral steroids for the symptoms. The treatment provided no relief. Her past medical history is significant for COPD.      Review of Systems   Constitutional:  Negative for chills and fever.   HENT:  Positive for congestion and postnasal drip. Negative for ear discharge, ear pain, nosebleeds, rhinorrhea and sore throat.    Respiratory:  Positive for cough, chest tightness, shortness of breath and wheezing.    Cardiovascular:  Negative for chest pain, palpitations and leg swelling.   Gastrointestinal:  Negative for abdominal pain, constipation, diarrhea and vomiting.   Genitourinary:  Negative for dysuria, frequency and hematuria.   Neurological:  Negative for dizziness, tremors, numbness and headaches.       Objective   /66 (BP Location: Left arm, Patient Position: Sitting)   Pulse 76   Temp 36.4 °C (97.6 °F)   Resp 16   Ht 1.651 m (5' 5\")   Wt 59 kg (130 lb)   " SpO2 97%   BMI 21.63 kg/m²     Physical Exam  Constitutional:       General: She is not in acute distress.     Appearance: Normal appearance.   HENT:      Head: Normocephalic and atraumatic.      Mouth/Throat:      Mouth: Mucous membranes are moist.      Pharynx: Oropharynx is clear. No oropharyngeal exudate or posterior oropharyngeal erythema.   Eyes:      General: No scleral icterus.     Extraocular Movements: Extraocular movements intact.      Pupils: Pupils are equal, round, and reactive to light.   Cardiovascular:      Rate and Rhythm: Normal rate and regular rhythm.      Pulses: Normal pulses.      Heart sounds: No murmur heard.     No friction rub. No gallop.   Pulmonary:      Effort: Pulmonary effort is normal.      Breath sounds: Normal air entry. Examination of the right-lower field reveals wheezing and rhonchi. Examination of the left-lower field reveals wheezing and rhonchi. Wheezing and rhonchi present. No rales.   Skin:     General: Skin is warm.      Coloration: Skin is not jaundiced or pale.   Neurological:      General: No focal deficit present.      Mental Status: She is alert and oriented to person, place, and time.      Cranial Nerves: No cranial nerve deficit.      Sensory: No sensory deficit.      Motor: No weakness.      Coordination: Coordination normal.      Gait: Gait normal.         Assessment/Plan   Problem List Items Addressed This Visit       Acute exacerbation of chronic obstructive pulmonary disease (COPD) (CMS/HCC)     We will start her on Prednisone and Azithromycin.  Recommend liberal oral fluid intake.  Call if symptoms fail to improve as expected.    Follow-up if persistent or worsening symptoms otherwise when necessary.         Relevant Medications    azithromycin (Zithromax) 250 mg tablet    predniSONE (Deltasone) 10 mg tablet    benzonatate (Tessalon) 200 mg capsule    Chronic kidney disease, stage 3a (CMS/HCC)     Stable, continue current medications and management.          Chronic obstructive pulmonary disease, unspecified (CMS/HCC)     Chronic Condition Documentation : Stable based on symptoms and exam.  Continue established treatment plan and follow-up at least yearly.         Contusion of left hand - Primary     Call if symptoms fail to improve as expected.    Follow-up if persistent or worsening symptoms otherwise when necessary.           Contusion of left wrist     Call if symptoms fail to improve as expected.    Follow-up if persistent or worsening symptoms otherwise when necessary.         Erosive osteoarthritis of left wrist     Natural history and expected course discussed. Questions answered.         Type 2 diabetes mellitus with stage 3a chronic kidney disease, without long-term current use of insulin (CMS/HCC)     Stable, continue current medications and management.          Scribe Attestation  By signing my name below, I, Meghana Ricketts   attest that this documentation has been prepared under the direction and in the presence of Darius Quezada MD.

## 2024-01-12 NOTE — ASSESSMENT & PLAN NOTE
We will start her on Prednisone and Azithromycin.  Recommend liberal oral fluid intake.  Call if symptoms fail to improve as expected.    Follow-up if persistent or worsening symptoms otherwise when necessary.

## 2024-01-12 NOTE — ASSESSMENT & PLAN NOTE
Call if symptoms fail to improve as expected.    Follow-up if persistent or worsening symptoms otherwise when necessary.

## 2024-01-13 ASSESSMENT — ENCOUNTER SYMPTOMS
FREQUENCY: 0
PALPITATIONS: 0
VOMITING: 0
HEMATURIA: 0
SHORTNESS OF BREATH: 1
CHEST TIGHTNESS: 1
DIARRHEA: 0
TREMORS: 0
ABDOMINAL PAIN: 0
RHINORRHEA: 0
DYSURIA: 0
CONSTIPATION: 0
NUMBNESS: 0
DIZZINESS: 0

## 2024-01-22 DIAGNOSIS — I10 ESSENTIAL HYPERTENSION: ICD-10-CM

## 2024-01-22 RX ORDER — NIFEDIPINE 30 MG/1
30 TABLET, FILM COATED, EXTENDED RELEASE ORAL DAILY
Qty: 90 TABLET | Refills: 2 | Status: SHIPPED | OUTPATIENT
Start: 2024-01-22

## 2024-01-22 NOTE — TELEPHONE ENCOUNTER
Rx Refill Request     Name: Katelyn Weiss  :  1935   Medication Name:  Nifedipine ER 30mg  Specific Pharmacy location:  [facility]   Date of last appointment:  2024   Date of next appointment:  3/4/2024   Best number to reach patient:  [phone]

## 2024-03-01 ENCOUNTER — LAB (OUTPATIENT)
Dept: LAB | Facility: LAB | Age: 89
End: 2024-03-01
Payer: MEDICARE

## 2024-03-01 DIAGNOSIS — N18.31 TYPE 2 DIABETES MELLITUS WITH STAGE 3A CHRONIC KIDNEY DISEASE, WITHOUT LONG-TERM CURRENT USE OF INSULIN (MULTI): ICD-10-CM

## 2024-03-01 DIAGNOSIS — I10 ESSENTIAL HYPERTENSION: ICD-10-CM

## 2024-03-01 DIAGNOSIS — E78.2 MIXED HYPERLIPIDEMIA: ICD-10-CM

## 2024-03-01 DIAGNOSIS — E11.22 TYPE 2 DIABETES MELLITUS WITH STAGE 3A CHRONIC KIDNEY DISEASE, WITHOUT LONG-TERM CURRENT USE OF INSULIN (MULTI): ICD-10-CM

## 2024-03-01 DIAGNOSIS — E03.9 ACQUIRED HYPOTHYROIDISM: ICD-10-CM

## 2024-03-01 LAB
ALBUMIN SERPL BCP-MCNC: 4.3 G/DL (ref 3.4–5)
ALP SERPL-CCNC: 92 U/L (ref 33–136)
ALT SERPL W P-5'-P-CCNC: 6 U/L (ref 7–45)
ANION GAP SERPL CALC-SCNC: 11 MMOL/L (ref 10–20)
AST SERPL W P-5'-P-CCNC: 14 U/L (ref 9–39)
BASOPHILS # BLD AUTO: 0.08 X10*3/UL (ref 0–0.1)
BASOPHILS NFR BLD AUTO: 1.3 %
BILIRUB SERPL-MCNC: 0.7 MG/DL (ref 0–1.2)
BUN SERPL-MCNC: 17 MG/DL (ref 6–23)
CALCIUM SERPL-MCNC: 9.3 MG/DL (ref 8.6–10.3)
CHLORIDE SERPL-SCNC: 99 MMOL/L (ref 98–107)
CHOLEST SERPL-MCNC: 125 MG/DL (ref 0–199)
CHOLESTEROL/HDL RATIO: 2.9
CO2 SERPL-SCNC: 31 MMOL/L (ref 21–32)
CREAT SERPL-MCNC: 0.84 MG/DL (ref 0.5–1.05)
EGFRCR SERPLBLD CKD-EPI 2021: 67 ML/MIN/1.73M*2
EOSINOPHIL # BLD AUTO: 0.24 X10*3/UL (ref 0–0.4)
EOSINOPHIL NFR BLD AUTO: 4 %
ERYTHROCYTE [DISTWIDTH] IN BLOOD BY AUTOMATED COUNT: 12.6 % (ref 11.5–14.5)
EST. AVERAGE GLUCOSE BLD GHB EST-MCNC: 126 MG/DL
GLUCOSE SERPL-MCNC: 109 MG/DL (ref 74–99)
HBA1C MFR BLD: 6 %
HCT VFR BLD AUTO: 36 % (ref 36–46)
HDLC SERPL-MCNC: 42.8 MG/DL
HGB BLD-MCNC: 11.7 G/DL (ref 12–16)
IMM GRANULOCYTES # BLD AUTO: 0.02 X10*3/UL (ref 0–0.5)
IMM GRANULOCYTES NFR BLD AUTO: 0.3 % (ref 0–0.9)
LDLC SERPL CALC-MCNC: 64 MG/DL
LYMPHOCYTES # BLD AUTO: 1.15 X10*3/UL (ref 0.8–3)
LYMPHOCYTES NFR BLD AUTO: 19.2 %
MCH RBC QN AUTO: 29.7 PG (ref 26–34)
MCHC RBC AUTO-ENTMCNC: 32.5 G/DL (ref 32–36)
MCV RBC AUTO: 91 FL (ref 80–100)
MONOCYTES # BLD AUTO: 0.75 X10*3/UL (ref 0.05–0.8)
MONOCYTES NFR BLD AUTO: 12.5 %
NEUTROPHILS # BLD AUTO: 3.75 X10*3/UL (ref 1.6–5.5)
NEUTROPHILS NFR BLD AUTO: 62.7 %
NON HDL CHOLESTEROL: 82 MG/DL (ref 0–149)
NRBC BLD-RTO: 0 /100 WBCS (ref 0–0)
PLATELET # BLD AUTO: 296 X10*3/UL (ref 150–450)
POTASSIUM SERPL-SCNC: 4.4 MMOL/L (ref 3.5–5.3)
PROT SERPL-MCNC: 6.5 G/DL (ref 6.4–8.2)
RBC # BLD AUTO: 3.94 X10*6/UL (ref 4–5.2)
SODIUM SERPL-SCNC: 137 MMOL/L (ref 136–145)
T4 FREE SERPL-MCNC: 2.51 NG/DL (ref 0.61–1.12)
TRIGL SERPL-MCNC: 92 MG/DL (ref 0–149)
TSH SERPL-ACNC: 3.04 MIU/L (ref 0.44–3.98)
VLDL: 18 MG/DL (ref 0–40)
WBC # BLD AUTO: 6 X10*3/UL (ref 4.4–11.3)

## 2024-03-01 PROCEDURE — 36415 COLL VENOUS BLD VENIPUNCTURE: CPT

## 2024-03-01 PROCEDURE — 80061 LIPID PANEL: CPT

## 2024-03-01 PROCEDURE — 80053 COMPREHEN METABOLIC PANEL: CPT

## 2024-03-01 PROCEDURE — 83036 HEMOGLOBIN GLYCOSYLATED A1C: CPT

## 2024-03-01 PROCEDURE — 85025 COMPLETE CBC W/AUTO DIFF WBC: CPT

## 2024-03-01 PROCEDURE — 84443 ASSAY THYROID STIM HORMONE: CPT

## 2024-03-01 PROCEDURE — 84439 ASSAY OF FREE THYROXINE: CPT

## 2024-03-04 ENCOUNTER — OFFICE VISIT (OUTPATIENT)
Dept: PRIMARY CARE | Facility: CLINIC | Age: 89
End: 2024-03-04
Payer: MEDICARE

## 2024-03-04 VITALS
SYSTOLIC BLOOD PRESSURE: 110 MMHG | BODY MASS INDEX: 21.99 KG/M2 | HEIGHT: 65 IN | HEART RATE: 64 BPM | DIASTOLIC BLOOD PRESSURE: 58 MMHG | OXYGEN SATURATION: 96 % | RESPIRATION RATE: 16 BRPM | WEIGHT: 132 LBS | TEMPERATURE: 97.7 F

## 2024-03-04 DIAGNOSIS — E78.2 MIXED HYPERLIPIDEMIA: ICD-10-CM

## 2024-03-04 DIAGNOSIS — E11.22 TYPE 2 DIABETES MELLITUS WITH STAGE 3A CHRONIC KIDNEY DISEASE, WITHOUT LONG-TERM CURRENT USE OF INSULIN (MULTI): ICD-10-CM

## 2024-03-04 DIAGNOSIS — Z00.00 ROUTINE GENERAL MEDICAL EXAMINATION AT HEALTH CARE FACILITY: Primary | ICD-10-CM

## 2024-03-04 DIAGNOSIS — I10 ESSENTIAL HYPERTENSION: ICD-10-CM

## 2024-03-04 DIAGNOSIS — N18.31 TYPE 2 DIABETES MELLITUS WITH STAGE 3A CHRONIC KIDNEY DISEASE, WITHOUT LONG-TERM CURRENT USE OF INSULIN (MULTI): ICD-10-CM

## 2024-03-04 DIAGNOSIS — R63.0 ANOREXIA: ICD-10-CM

## 2024-03-04 DIAGNOSIS — R13.14 PHARYNGOESOPHAGEAL DYSPHAGIA: ICD-10-CM

## 2024-03-04 DIAGNOSIS — J44.9 CHRONIC OBSTRUCTIVE PULMONARY DISEASE, UNSPECIFIED COPD TYPE (MULTI): ICD-10-CM

## 2024-03-04 DIAGNOSIS — E03.9 ACQUIRED HYPOTHYROIDISM: ICD-10-CM

## 2024-03-04 DIAGNOSIS — N18.31 CHRONIC KIDNEY DISEASE, STAGE 3A (MULTI): ICD-10-CM

## 2024-03-04 DIAGNOSIS — Z87.19 HISTORY OF ESOPHAGEAL STRICTURE: ICD-10-CM

## 2024-03-04 DIAGNOSIS — D64.9 CHRONIC ANEMIA: ICD-10-CM

## 2024-03-04 DIAGNOSIS — F41.1 GENERALIZED ANXIETY DISORDER: ICD-10-CM

## 2024-03-04 PROBLEM — J44.1 ACUTE EXACERBATION OF CHRONIC OBSTRUCTIVE PULMONARY DISEASE (COPD) (MULTI): Status: RESOLVED | Noted: 2024-01-12 | Resolved: 2024-03-04

## 2024-03-04 PROCEDURE — 1160F RVW MEDS BY RX/DR IN RCRD: CPT | Performed by: FAMILY MEDICINE

## 2024-03-04 PROCEDURE — 1159F MED LIST DOCD IN RCRD: CPT | Performed by: FAMILY MEDICINE

## 2024-03-04 PROCEDURE — 1036F TOBACCO NON-USER: CPT | Performed by: FAMILY MEDICINE

## 2024-03-04 PROCEDURE — 99214 OFFICE O/P EST MOD 30 MIN: CPT | Performed by: FAMILY MEDICINE

## 2024-03-04 PROCEDURE — 1125F AMNT PAIN NOTED PAIN PRSNT: CPT | Performed by: FAMILY MEDICINE

## 2024-03-04 PROCEDURE — 1170F FXNL STATUS ASSESSED: CPT | Performed by: FAMILY MEDICINE

## 2024-03-04 PROCEDURE — 1158F ADVNC CARE PLAN TLK DOCD: CPT | Performed by: FAMILY MEDICINE

## 2024-03-04 PROCEDURE — 3074F SYST BP LT 130 MM HG: CPT | Performed by: FAMILY MEDICINE

## 2024-03-04 PROCEDURE — G0439 PPPS, SUBSEQ VISIT: HCPCS | Performed by: FAMILY MEDICINE

## 2024-03-04 PROCEDURE — 3078F DIAST BP <80 MM HG: CPT | Performed by: FAMILY MEDICINE

## 2024-03-04 PROCEDURE — 1123F ACP DISCUSS/DSCN MKR DOCD: CPT | Performed by: FAMILY MEDICINE

## 2024-03-04 RX ORDER — ALPRAZOLAM 1 MG/1
1 TABLET ORAL 2 TIMES DAILY PRN
Qty: 60 TABLET | Refills: 1 | Status: SHIPPED | OUTPATIENT
Start: 2024-03-04 | End: 2024-06-03 | Stop reason: SDUPTHER

## 2024-03-04 RX ORDER — MIRTAZAPINE 15 MG/1
15 TABLET, FILM COATED ORAL NIGHTLY
Qty: 30 TABLET | Refills: 3 | Status: CANCELLED | OUTPATIENT
Start: 2024-03-04

## 2024-03-04 ASSESSMENT — ENCOUNTER SYMPTOMS
DECREASED CONCENTRATION: 0
VOMITING: 0
VISUAL CHANGE: 0
PALPITATIONS: 0
SORE THROAT: 0
DIARRHEA: 0
FEVER: 0
COUGH: 0
TREMORS: 0
WEAKNESS: 0
HEADACHES: 0
DYSURIA: 0
WHEEZING: 0
ABDOMINAL PAIN: 0
POLYPHAGIA: 0
HEMATURIA: 0
FREQUENCY: 0
WEIGHT LOSS: 0
FATIGUE: 0
RHINORRHEA: 0
OCCASIONAL FEELINGS OF UNSTEADINESS: 1
NUMBNESS: 0
IRRITABILITY: 0
DEPRESSED MOOD: 0
CHILLS: 0
CONSTIPATION: 0
POLYDIPSIA: 0
BLURRED VISION: 0
NERVOUS/ANXIOUS: 1
DEPRESSION: 0
DIZZINESS: 0
LOSS OF SENSATION IN FEET: 0
SHORTNESS OF BREATH: 0

## 2024-03-04 ASSESSMENT — ACTIVITIES OF DAILY LIVING (ADL)
DRESSING: INDEPENDENT
BATHING: INDEPENDENT
DOING_HOUSEWORK: INDEPENDENT
GROCERY_SHOPPING: INDEPENDENT
GROCERY_SHOPPING: INDEPENDENT
TAKING_MEDICATION: INDEPENDENT
BATHING: INDEPENDENT
MANAGING_FINANCES: INDEPENDENT
DRESSING: INDEPENDENT
TAKING_MEDICATION: INDEPENDENT
DOING_HOUSEWORK: INDEPENDENT
MANAGING_FINANCES: INDEPENDENT

## 2024-03-04 ASSESSMENT — ANXIETY QUESTIONNAIRES
GAD7 TOTAL SCORE: 0
1. FEELING NERVOUS, ANXIOUS, OR ON EDGE: NOT AT ALL
6. BECOMING EASILY ANNOYED OR IRRITABLE: NOT AT ALL
2. NOT BEING ABLE TO STOP OR CONTROL WORRYING: NOT AT ALL
5. BEING SO RESTLESS THAT IT IS HARD TO SIT STILL: NOT AT ALL
3. WORRYING TOO MUCH ABOUT DIFFERENT THINGS: NOT AT ALL
IF YOU CHECKED OFF ANY PROBLEMS ON THIS QUESTIONNAIRE, HOW DIFFICULT HAVE THESE PROBLEMS MADE IT FOR YOU TO DO YOUR WORK, TAKE CARE OF THINGS AT HOME, OR GET ALONG WITH OTHER PEOPLE: NOT DIFFICULT AT ALL
4. TROUBLE RELAXING: NOT AT ALL
7. FEELING AFRAID AS IF SOMETHING AWFUL MIGHT HAPPEN: NOT AT ALL

## 2024-03-04 ASSESSMENT — PATIENT HEALTH QUESTIONNAIRE - PHQ9
1. LITTLE INTEREST OR PLEASURE IN DOING THINGS: NOT AT ALL
2. FEELING DOWN, DEPRESSED OR HOPELESS: NOT AT ALL
SUM OF ALL RESPONSES TO PHQ9 QUESTIONS 1 AND 2: 0

## 2024-03-04 NOTE — ASSESSMENT & PLAN NOTE
"We will have her continue the Nifedipine daily and to monitor her blood pressure closely and contact us if it is running low consistently.  Dietary sodium restriction.  Regular aerobic exercise program is recommended to help achieve and maintain normal body weight, fitness and improve lipid balance. .  Dietary changes: Increase soluble fiber  Plant sterols 2grams per day (e.g. Benecol)  Reduce saturated fat, \"trans\" monounsaturated fatty acids, and cholesterol  "

## 2024-03-04 NOTE — ASSESSMENT & PLAN NOTE
Diabetes Mellitus type II, under good control.  1. Rx changes: None  2. Education: Reviewed ‘ABCs’ of diabetes management (respective goals in parentheses):  A1C (<7), blood pressure (<130/80), and cholesterol (LDL <100).  3. Compliance at present is estimated to be good. Efforts to improve compliance (if necessary) will be directed at dietary modifications: and increased exercise.  4. Follow up: 3 months

## 2024-03-04 NOTE — PROGRESS NOTES
Subjective   Patient ID: Katelyn Weiss is a 89 y.o. female who presents for Medicare Annual Wellness Visit Subsequent, Follow-up (Diabetes, Hypertension, Hyperlipidemia, Hypothyroidism, Anxiety, and labs), and Dysphagia.    She presents for Annual Medicare Wellness Visit and follow-up on hypertension and hyperlipidemia. She has no chest pain, dyspnea, exertional chest pressure/discomfort, near-syncope, orthopnea, palpitations, paroxysmal nocturnal dyspnea, and syncope. Taking her medication regularly with no side effects.    She presents for follow up of hypothyroidism. Current symptoms: none. She has no change in energy level, diarrhea, heat / cold intolerance, palpitations, or weight changes.     Patient complains of difficulty swallowing. The dysphagia occurs with solids Symptoms have been present for approximately several months. The symptoms are unchanged. She has no melena, hematochezia, hematemesis, or coffee ground emesis. She does have history of esophageal stricture which had to be stretched several years ago.    Anxiety  Presents for follow-up visit. Symptoms include nervous/anxious behavior. Patient reports no chest pain, decreased concentration, depressed mood, dizziness, irritability, palpitations, shortness of breath or suicidal ideas. Symptoms occur occasionally. The severity of symptoms is mild. The patient sleeps 9 hours per night. The quality of sleep is good. Nighttime awakenings: occasional.       Diabetes  She presents for her follow-up diabetic visit. She has type 2 diabetes mellitus. Her disease course has been stable. Hypoglycemia symptoms include nervousness/anxiousness. Pertinent negatives for hypoglycemia include no dizziness, headaches or tremors. Pertinent negatives for diabetes include no blurred vision, no chest pain, no fatigue, no foot paresthesias, no foot ulcerations, no polydipsia, no polyphagia, no polyuria, no visual change, no weakness and no weight loss. Risk factors for  "coronary artery disease include diabetes mellitus, dyslipidemia and hypertension.     Past Medical, Surgical, and Family History reviewed and updated in chart.    Reviewed all medications by prescribing practitioner or clinical pharmacist (such as prescriptions, OTCs, herbal therapies and supplements) and documented in the medical record.    Patient Self Assessment of Health Status  Patient Self Assessment: Good    Nutrition and Exercise  Current Diet: Well Balanced Diet  Adequate Fluid Intake: Yes  Caffeine: Yes  Exercise Frequency: Infrequently    Functional Ability/Level of Safety  Cognitive Impairment Observed: No cognitive impairment observed  Cognitive Impairment Reported: No cognitive impairment reported by patient or family    Home Safety Risk Factors: None       Review of Systems   Constitutional:  Negative for chills, fatigue, fever, irritability and weight loss.   HENT:  Negative for congestion, ear pain, nosebleeds, rhinorrhea and sore throat.    Eyes:  Negative for blurred vision.   Respiratory:  Negative for cough, shortness of breath and wheezing.    Cardiovascular:  Negative for chest pain, palpitations and leg swelling.   Gastrointestinal:  Negative for abdominal pain, constipation, diarrhea and vomiting.   Endocrine: Negative for polydipsia, polyphagia and polyuria.   Genitourinary:  Negative for dysuria, frequency and hematuria.   Neurological:  Negative for dizziness, tremors, weakness, numbness and headaches.   Psychiatric/Behavioral:  Negative for decreased concentration and suicidal ideas. The patient is nervous/anxious.        Objective   /58   Pulse 64   Temp 36.5 °C (97.7 °F)   Resp 16   Ht 1.651 m (5' 5\")   Wt 59.9 kg (132 lb)   SpO2 96%   BMI 21.97 kg/m²     Physical Exam  Constitutional:       General: She is not in acute distress.     Appearance: Normal appearance.   HENT:      Head: Normocephalic and atraumatic.      Mouth/Throat:      Mouth: Mucous membranes are moist.    "   Pharynx: Oropharynx is clear. No oropharyngeal exudate or posterior oropharyngeal erythema.   Eyes:      General: No scleral icterus.     Extraocular Movements: Extraocular movements intact.      Pupils: Pupils are equal, round, and reactive to light.   Cardiovascular:      Rate and Rhythm: Normal rate and regular rhythm.      Pulses: Normal pulses.      Heart sounds: S1 normal and S2 normal. Murmur heard.      Systolic murmur is present with a grade of 2/6.      No friction rub. No gallop. No S3 or S4 sounds.   Pulmonary:      Effort: Pulmonary effort is normal.      Breath sounds: No wheezing, rhonchi or rales.   Skin:     General: Skin is warm.      Coloration: Skin is not jaundiced or pale.      Findings: No erythema or rash.   Neurological:      General: No focal deficit present.      Mental Status: She is alert and oriented to person, place, and time.      Cranial Nerves: No cranial nerve deficit.      Sensory: No sensory deficit.      Coordination: Coordination normal.      Gait: Gait normal.         Lab on 03/01/2024   Component Date Value Ref Range Status   • WBC 03/01/2024 6.0  4.4 - 11.3 x10*3/uL Final   • nRBC 03/01/2024 0.0  0.0 - 0.0 /100 WBCs Final   • RBC 03/01/2024 3.94 (L)  4.00 - 5.20 x10*6/uL Final   • Hemoglobin 03/01/2024 11.7 (L)  12.0 - 16.0 g/dL Final   • Hematocrit 03/01/2024 36.0  36.0 - 46.0 % Final   • MCV 03/01/2024 91  80 - 100 fL Final   • MCH 03/01/2024 29.7  26.0 - 34.0 pg Final   • MCHC 03/01/2024 32.5  32.0 - 36.0 g/dL Final   • RDW 03/01/2024 12.6  11.5 - 14.5 % Final   • Platelets 03/01/2024 296  150 - 450 x10*3/uL Final   • Neutrophils % 03/01/2024 62.7  40.0 - 80.0 % Final   • Immature Granulocytes %, Automated 03/01/2024 0.3  0.0 - 0.9 % Final    Immature Granulocyte Count (IG) includes promyelocytes, myelocytes and metamyelocytes but does not include bands. Percent differential counts (%) should be interpreted in the context of the absolute cell counts (cells/UL).   •  Lymphocytes % 03/01/2024 19.2  13.0 - 44.0 % Final   • Monocytes % 03/01/2024 12.5  2.0 - 10.0 % Final   • Eosinophils % 03/01/2024 4.0  0.0 - 6.0 % Final   • Basophils % 03/01/2024 1.3  0.0 - 2.0 % Final   • Neutrophils Absolute 03/01/2024 3.75  1.60 - 5.50 x10*3/uL Final    Percent differential counts (%) should be interpreted in the context of the absolute cell counts (cells/uL).   • Immature Granulocytes Absolute, Au* 03/01/2024 0.02  0.00 - 0.50 x10*3/uL Final   • Lymphocytes Absolute 03/01/2024 1.15  0.80 - 3.00 x10*3/uL Final   • Monocytes Absolute 03/01/2024 0.75  0.05 - 0.80 x10*3/uL Final   • Eosinophils Absolute 03/01/2024 0.24  0.00 - 0.40 x10*3/uL Final   • Basophils Absolute 03/01/2024 0.08  0.00 - 0.10 x10*3/uL Final   • Glucose 03/01/2024 109 (H)  74 - 99 mg/dL Final   • Sodium 03/01/2024 137  136 - 145 mmol/L Final   • Potassium 03/01/2024 4.4  3.5 - 5.3 mmol/L Final   • Chloride 03/01/2024 99  98 - 107 mmol/L Final   • Bicarbonate 03/01/2024 31  21 - 32 mmol/L Final   • Anion Gap 03/01/2024 11  10 - 20 mmol/L Final   • Urea Nitrogen 03/01/2024 17  6 - 23 mg/dL Final   • Creatinine 03/01/2024 0.84  0.50 - 1.05 mg/dL Final   • eGFR 03/01/2024 67  >60 mL/min/1.73m*2 Final    Calculations of estimated GFR are performed using the 2021 CKD-EPI Study Refit equation without the race variable for the IDMS-Traceable creatinine methods.  https://jasn.asnjournals.org/content/early/2021/09/22/ASN.3110180178   • Calcium 03/01/2024 9.3  8.6 - 10.3 mg/dL Final   • Albumin 03/01/2024 4.3  3.4 - 5.0 g/dL Final   • Alkaline Phosphatase 03/01/2024 92  33 - 136 U/L Final   • Total Protein 03/01/2024 6.5  6.4 - 8.2 g/dL Final   • AST 03/01/2024 14  9 - 39 U/L Final   • Bilirubin, Total 03/01/2024 0.7  0.0 - 1.2 mg/dL Final   • ALT 03/01/2024 6 (L)  7 - 45 U/L Final    Patients treated with Sulfasalazine may generate falsely decreased results for ALT.   • Hemoglobin A1C 03/01/2024 6.0 (H)  see below % Final   • Estimated  Average Glucose 03/01/2024 126  Not Established mg/dL Final   • Cholesterol 03/01/2024 125  0 - 199 mg/dL Final          Age      Desirable   Borderline High   High     0-19 Y     0 - 169       170 - 199     >/= 200    20-24 Y     0 - 189       190 - 224     >/= 225         >24 Y     0 - 199       200 - 239     >/= 240   **All ranges are based on fasting samples. Specific   therapeutic targets will vary based on patient-specific   cardiac risk.    Pediatric guidelines reference:Pediatrics 2011, 128(S5).Adult guidelines reference: NCEP ATPIII Guidelines,GABRIELLE 2001, 258:2486-97    Venipuncture immediately after or during the administration of Metamizole may lead to falsely low results. Testing should be performed immediately prior to Metamizole dosing.   • HDL-Cholesterol 03/01/2024 42.8  mg/dL Final      Age       Very Low   Low     Normal    High    0-19 Y    < 35      < 40     40-45     ----  20-24 Y    ----     < 40      >45      ----        >24 Y      ----     < 40     40-60      >60     • Cholesterol/HDL Ratio 03/01/2024 2.9   Final      Ref Values  Desirable  < 3.4  High Risk  > 5.0   • LDL Calculated 03/01/2024 64  <=99 mg/dL Final                                Near   Borderline      AGE      Desirable  Optimal    High     High     Very High     0-19 Y     0 - 109     ---    110-129   >/= 130     ----    20-24 Y     0 - 119     ---    120-159   >/= 160     ----      >24 Y     0 -  99   100-129  130-159   160-189     >/=190     • VLDL 03/01/2024 18  0 - 40 mg/dL Final   • Triglycerides 03/01/2024 92  0 - 149 mg/dL Final       Age         Desirable   Borderline High   High     Very High   0 D-90 D    19 - 174         ----         ----        ----  91 D- 9 Y     0 -  74        75 -  99     >/= 100      ----    10-19 Y     0 -  89        90 - 129     >/= 130      ----    20-24 Y     0 - 114       115 - 149     >/= 150      ----         >24 Y     0 - 149       150 - 199    200- 499    >/= 500    Venipuncture  "immediately after or during the administration of Metamizole may lead to falsely low results. Testing should be performed immediately prior to Metamizole dosing.   • Non HDL Cholesterol 03/01/2024 82  0 - 149 mg/dL Final          Age       Desirable   Borderline High   High     Very High     0-19 Y     0 - 119       120 - 144     >/= 145    >/= 160    20-24 Y     0 - 149       150 - 189     >/= 190      ----         >24 Y    30 mg/dL above LDL Cholesterol goal     • Thyroid Stimulating Hormone 03/01/2024 3.04  0.44 - 3.98 mIU/L Final   • Thyroxine, Free 03/01/2024 2.51 (H)  0.61 - 1.12 ng/dL Final     Assessment/Plan   Problem List Items Addressed This Visit     Acquired hypothyroidism     Well-controlled, continue current medications and management.         Relevant Orders    Follow Up In [facility] - PCP - Established    Thyroid Stimulating Hormone    Thyroxine, Free    Chronic anemia     Stable, continue current medications and management.         Chronic kidney disease, stage 3a (CMS/HCC)     Stable, continue current medications and management.         Chronic obstructive pulmonary disease, unspecified (CMS/HCC)     Well-controlled, continue current medications and management.         Dysphagia     We will refer her to the Gastroenterologist for further evaluation.         Relevant Orders    Referral to Gastroenterology    Essential hypertension     We will have her continue the Nifedipine daily and to monitor her blood pressure closely and contact us if it is running low consistently.  Dietary sodium restriction.  Regular aerobic exercise program is recommended to help achieve and maintain normal body weight, fitness and improve lipid balance. .  Dietary changes: Increase soluble fiber  Plant sterols 2grams per day (e.g. Benecol)  Reduce saturated fat, \"trans\" monounsaturated fatty acids, and cholesterol         Relevant Orders    Follow Up In [facility] - PCP - Established    CBC and Auto " Differential    Comprehensive Metabolic Panel    Hemoglobin A1C    Lipid Panel    Generalized anxiety disorder     Stable, continue current medications and management.         Relevant Medications    ALPRAZolam (Xanax) 1 mg tablet    Other Relevant Orders    Follow Up In [facility] - PCP - Established    Mixed hyperlipidemia     The nature of cardiac risk has been fully discussed with this patient. Discussed cardiovascular risk analysis and appropriate diet with the need for lifelong measures to reduce the risk. A regular exercise program is recommended to help achieve and maintain normal body weight, fitness and improve lipid balance. Patient education provided. They understand and agree with this course of treatment. They will return with new or worsening symptoms. Patient instructed to remain current with appropriate annual health maintenance.          Relevant Orders    Follow Up In [facility] - PCP - Established    CBC and Auto Differential    Comprehensive Metabolic Panel    Hemoglobin A1C    Lipid Panel    Type 2 diabetes mellitus with stage 3a chronic kidney disease, without long-term current use of insulin (CMS/[facility])     Diabetes Mellitus type II, under good control.  1. Rx changes: None  2. Education: Reviewed ‘ABCs’ of diabetes management (respective goals in parentheses):  A1C (<7), blood pressure (<130/80), and cholesterol (LDL <100).  3. Compliance at present is estimated to be good. Efforts to improve compliance (if necessary) will be directed at dietary modifications: and increased exercise.  4. Follow up: 3 months         Relevant Orders    Follow Up In [facility] - PCP - Established    CBC and Auto Differential    Comprehensive Metabolic Panel    Hemoglobin A1C    Lipid Panel    Anorexia     We will wean her off the Mirtazepine by having her take 0.5 tablet nightly for 1 week then 0.5 tablet every other night for 1 week then stop.         Routine general medical  examination at health care facility - Primary     Recommend low-cholesterol diet, low-fat diet and low-salt diet.  The need for lifelong dietary compliance in order to reduce cardiac risk is recommended.  We will also recommend regular exercise program to improve lipid balance and overall health.  Recommend decreasing fat and cholesterol in diet, increasing aerobic exercise with a goal of 4 or more days per week        Other Visit Diagnoses     History of esophageal stricture        Relevant Orders    Referral to Gastroenterology      Lab and other ancillary tests ordered as above.  See current orders.   Lab results and medications were discussed update refills on the medications.   The risks benefits and side effects of the medications were also discussed  All questions addressed.     OARRS:  Darius Quezada MD on 3/4/2024 12:11 PM  I have personally reviewed the OARRS report for Katelyn Weiss. I have considered the risks of abuse, dependence, addiction and diversion    Is the patient prescribed a combination of a benzodiazepine and opioid?  No    Last Urine Drug Screen / ordered today: No  Recent Results (from the past 8760 hour(s))   OPIATE/OPIOID/BENZO PRESCRIPTION COMPLIANCE    Collection Time: 09/11/23 12:21 PM   Result Value Ref Range    DRUG SCREEN COMMENT URINE SEE BELOW     Creatine, Urine 129.0 mg/dL    Amphetamine Screen, Urine PRESUMPTIVE NEGATIVE NEGATIVE    Barbiturate Screen, Urine PRESUMPTIVE NEGATIVE NEGATIVE    Cannabinoid Screen, Urine PRESUMPTIVE NEGATIVE NEGATIVE    Cocaine Screen, Urine PRESUMPTIVE NEGATIVE NEGATIVE    PCP Screen, Urine PRESUMPTIVE NEGATIVE NEGATIVE    7-Aminoclonazepam <25 Cutoff <25 ng/mL    Alpha-Hydroxyalprazolam 805 (A) Cutoff <25 ng/mL    Alpha-Hydroxymidazolam <25 Cutoff <25 ng/mL    Alprazolam 269 (A) Cutoff <25 ng/mL    Chlordiazepoxide <25 Cutoff <25 ng/mL    Clonazepam <25 Cutoff <25 ng/mL    Diazepam <25 Cutoff <25 ng/mL    Lorazepam <25 Cutoff <25 ng/mL     Midazolam <25 Cutoff <25 ng/mL    Nordiazepam <25 Cutoff <25 ng/mL    Oxazepam <25 Cutoff <25 ng/mL    Temazepam <25 Cutoff <25 ng/mL    Zolpidem <25 Cutoff <25 ng/mL    Zolpidem Metabolite (ZCA) <25 Cutoff <25 ng/mL    6-Acetylmorphine <25 Cutoff <25 ng/mL    Codeine <50 Cutoff <50 ng/mL    Hydrocodone <25 Cutoff <25 ng/mL    Hydromorphone <25 Cutoff <25 ng/mL    Morphine Urine <50 Cutoff <50 ng/mL    Norhydrocodone <25 Cutoff <25 ng/mL    Noroxycodone <25 Cutoff <25 ng/mL    Oxycodone <25 Cutoff <25 ng/mL    Oxymorphone <25 Cutoff <25 ng/mL    Tramadol <50 Cutoff <50 ng/mL    O-Desmethyltramadol <50 Cutoff <50 ng/mL    Fentanyl <2.5 Cutoff<2.5 ng/mL    Norfentanyl <2.5 Cutoff<2.5 ng/mL    METHADONE CONFIRMATION,URINE <25 Cutoff <25 ng/mL    EDDP <25 Cutoff <25 ng/mL     Controlled Substance Agreement:  Date of the Last Agreement: 3/4/2024  Reviewed Controlled Substance Agreement including but not limited to the benefits, risks, and alternatives to treatment with a Controlled Substance medication(s).    Benzodiazepines:  What is the patient's goal of therapy? Able to function without being unduly anxious and carry out day-to-day activities without tension and panic attacks    Is this being achieved with current treatment? yes    JOSE-7:  Over the last 2 weeks, how often have you been bothered by any of the following problems?  Feeling nervous, anxious, or on edge: 0  Not being able to stop or control worryin  Worrying too much about different things: 0  Trouble relaxin  Being so restless that it is hard to sit still: 0  Becoming easily annoyed or irritable: 0  Feeling afraid as if something awful might happen: 0  JOSE-7 Total Score: 0    Activities of Daily Living:   Is your overall impression that this patient is benefiting (symptom reduction outweighs side effects) from benzodiazepine therapy? Yes     1. Physical Functioning: Better  2. Family Relationship: Better  3. Social Relationship: Better  4. Mood:  Better  5. Sleep Patterns: Better  6. Overall Function: Better    Scribe Attestation  By signing my name below, I, Meghana Ricketts   attest that this documentation has been prepared under the direction and in the presence of Darius Quezada MD.    Patient was identified as a fall risk. Risk prevention instructions provided.  Patient was identified as a fall risk. Risk prevention instructions provided.

## 2024-03-04 NOTE — ASSESSMENT & PLAN NOTE
We will wean her off the Mirtazepine by having her take 0.5 tablet nightly for 1 week then 0.5 tablet every other night for 1 week then stop.

## 2024-03-04 NOTE — PATIENT INSTRUCTIONS

## 2024-04-01 DIAGNOSIS — K21.9 GASTROESOPHAGEAL REFLUX DISEASE WITHOUT ESOPHAGITIS: ICD-10-CM

## 2024-04-01 RX ORDER — PANTOPRAZOLE SODIUM 40 MG/1
40 TABLET, DELAYED RELEASE ORAL DAILY
Qty: 90 TABLET | Refills: 1 | Status: SHIPPED | OUTPATIENT
Start: 2024-04-01

## 2024-04-01 NOTE — TELEPHONE ENCOUNTER
Rx Refill Request     Name: Katelyn Weiss  :  1935   Medication Name:  Pantoprazole 40 mg  Specific Pharmacy location:  [facility]  Date of last appointment:  3/4/2024   Date of next appointment:  6/3/2024   Best number to reach patient:  [phone]

## 2024-04-17 DIAGNOSIS — N18.31 TYPE 2 DIABETES MELLITUS WITH STAGE 3A CHRONIC KIDNEY DISEASE, WITHOUT LONG-TERM CURRENT USE OF INSULIN (MULTI): ICD-10-CM

## 2024-04-17 DIAGNOSIS — E11.22 TYPE 2 DIABETES MELLITUS WITH STAGE 3A CHRONIC KIDNEY DISEASE, WITHOUT LONG-TERM CURRENT USE OF INSULIN (MULTI): ICD-10-CM

## 2024-04-17 RX ORDER — METFORMIN HYDROCHLORIDE 500 MG/1
500 TABLET, EXTENDED RELEASE ORAL
Qty: 90 TABLET | Refills: 1 | Status: SHIPPED | OUTPATIENT
Start: 2024-04-17

## 2024-04-17 NOTE — TELEPHONE ENCOUNTER
Rx Refill Request     Name: Katelyn Weiss  :  1935   Medication Name:  metformin 500MG tabs   Specific Pharmacy location:  [facility]   Date of last appointment:  3/4/24  Date of next appointment:  6/3/24  Best number to reach patient:  [phone]

## 2024-04-24 ENCOUNTER — OFFICE VISIT (OUTPATIENT)
Dept: GASTROENTEROLOGY | Facility: CLINIC | Age: 89
End: 2024-04-24
Payer: MEDICARE

## 2024-04-24 VITALS
DIASTOLIC BLOOD PRESSURE: 69 MMHG | RESPIRATION RATE: 16 BRPM | OXYGEN SATURATION: 96 % | WEIGHT: 132 LBS | SYSTOLIC BLOOD PRESSURE: 143 MMHG | HEIGHT: 65 IN | HEART RATE: 54 BPM | BODY MASS INDEX: 21.99 KG/M2

## 2024-04-24 DIAGNOSIS — Z87.19 HISTORY OF ESOPHAGEAL STRICTURE: ICD-10-CM

## 2024-04-24 DIAGNOSIS — E78.2 MIXED HYPERLIPIDEMIA: ICD-10-CM

## 2024-04-24 DIAGNOSIS — R13.12 OROPHARYNGEAL DYSPHAGIA: Primary | ICD-10-CM

## 2024-04-24 PROCEDURE — 1036F TOBACCO NON-USER: CPT | Performed by: STUDENT IN AN ORGANIZED HEALTH CARE EDUCATION/TRAINING PROGRAM

## 2024-04-24 PROCEDURE — 99204 OFFICE O/P NEW MOD 45 MIN: CPT | Performed by: STUDENT IN AN ORGANIZED HEALTH CARE EDUCATION/TRAINING PROGRAM

## 2024-04-24 PROCEDURE — 1159F MED LIST DOCD IN RCRD: CPT | Performed by: STUDENT IN AN ORGANIZED HEALTH CARE EDUCATION/TRAINING PROGRAM

## 2024-04-24 PROCEDURE — 1123F ACP DISCUSS/DSCN MKR DOCD: CPT | Performed by: STUDENT IN AN ORGANIZED HEALTH CARE EDUCATION/TRAINING PROGRAM

## 2024-04-24 PROCEDURE — 3078F DIAST BP <80 MM HG: CPT | Performed by: STUDENT IN AN ORGANIZED HEALTH CARE EDUCATION/TRAINING PROGRAM

## 2024-04-24 PROCEDURE — 1160F RVW MEDS BY RX/DR IN RCRD: CPT | Performed by: STUDENT IN AN ORGANIZED HEALTH CARE EDUCATION/TRAINING PROGRAM

## 2024-04-24 PROCEDURE — 3077F SYST BP >= 140 MM HG: CPT | Performed by: STUDENT IN AN ORGANIZED HEALTH CARE EDUCATION/TRAINING PROGRAM

## 2024-04-24 RX ORDER — ATORVASTATIN CALCIUM 20 MG/1
20 TABLET, FILM COATED ORAL DAILY
Qty: 30 TABLET | Refills: 0 | Status: SHIPPED | OUTPATIENT
Start: 2024-04-24 | End: 2024-05-20 | Stop reason: SDUPTHER

## 2024-04-24 NOTE — PROGRESS NOTES
CC: Dysphagia.    History of Present Illness:   Katelyn Weiss is a 89 y.o. female with a PMH of HTN, HLD, DM, hypothyroidism, anxiety, GERD ,COPD who presents to clinic for dysphagia.  Patient complains of small food items and medications getting stuck in the back of her throat.  This will also elicit cough.  She does not feel like food items get stuck once they make it to her esophagus.  She has no other GI complaints at this time.  She is a former .    EGD 2019: 1 cm hiatal hernia, esophageal spasm, low grade narrowing esophageal Schatzki's ring s/p dilation to 20 mm.    Review of Systems  ROS Negative unless otherwise stated above.    Past Medical/Surgical History  Past Medical History:   Diagnosis Date    Acute sinusitis, unspecified 04/24/2019    Acute non-recurrent sinusitis, unspecified location    Encounter for general adult medical examination without abnormal findings 02/19/2020    Routine general medical examination at health care facility    Personal history of other endocrine, nutritional and metabolic disease     History of thyroid disorder      Past Surgical History:   Procedure Laterality Date    MR CHEST ANGIO W IV CONTRAST  12/5/2014    MR CHEST ANGIO W IV CONTRAST 12/5/2014 [facility]    OTHER SURGICAL HISTORY  02/27/2019    Umbilical hernia repair        Social History   reports that she has never smoked. She has never used smokeless tobacco. She reports that she does not drink alcohol and does not use drugs.     Family History  family history includes Aneurysm in her father; Colon cancer in her daughter.     Allergies  Allergies   Allergen Reactions    Bee Venom Protein (Honey Bee) Anaphylaxis    Losartan Potassium Unknown    Sulfa (Sulfonamide Antibiotics) Unknown    Sulfamethoxazole Unknown       Medications  Current Outpatient Medications   Medication Instructions    albuterol 90 mcg/actuation inhaler inhalation, INHALE 1 TO 2 PUFFS EVERY 4 TO 6 HOURS AS NEEDED.    ALPRAZolam  (XANAX) 1 mg, oral, 2 times daily PRN    aspirin 81 mg chewable tablet oral    atorvastatin (LIPITOR) 20 mg, oral, Daily    bimatoprost (Lumigan) 0.03 % ophthalmic solution ophthalmic (eye)    cyanocobalamin (Vitamin B-12) 1,000 mcg/mL injection 1 mL, Every 14 days, Inject    cyanocobalamin (VITAMIN B-12) 1,000 mcg, oral, Daily    docusate sodium (COLACE) 100 mg, oral, 2 times daily    fluticasone (Flonase) 50 mcg/actuation nasal spray 2 sprays, Each Nostril, Daily, SHAKE LIQUID    lancets 33 gauge misc miscellaneous    levothyroxine (SYNTHROID, LEVOXYL) 50 mcg, oral, Daily before breakfast    meloxicam (MOBIC) 15 mg, oral, Daily, Take with food    metFORMIN XR (GLUCOPHAGE-XR) 500 mg, oral, Daily with evening meal    metoprolol tartrate (LOPRESSOR) 12.5 mg, oral, 2 times daily    NIFEdipine ER (ADALAT CC) 30 mg, oral, Daily    nitroglycerin (NITROSTAT) 0.4 mg, sublingual, Every 5 min PRN    OneTouch Ultra Test strip TEST 3 TIMES DAILY.TEST 3 TIMES DAILY.    pantoprazole (PROTONIX) 40 mg, oral, Daily    tamsulosin (FLOMAX) 0.4 mg, oral, Daily    timolol (Timoptic) 0.5 % ophthalmic solution 1 drop, Both Eyes, Daily        Objective   Visit Vitals  /69   Pulse 54   Resp 16        General: A&Ox3, NAD.  HEENT: AT/NC.   CV: RRR. No murmur.  Resp: CTA bilaterally. No wheezing, rhonchi or rales.   GI: Soft, NT/ND.   Extrem: No edema. Pulses intact.  Skin: No Jaundice.   Neuro: No focal deficits.   Psych: Normal mood and affect.     Lab Results   Component Value Date    WBC 6.0 03/01/2024    HGB 11.7 (L) 03/01/2024    HCT 36.0 03/01/2024    MCV 91 03/01/2024     03/01/2024       Chemistry    Lab Results   Component Value Date/Time     03/01/2024 1121    K 4.4 03/01/2024 1121    CL 99 03/01/2024 1121    CO2 31 03/01/2024 1121    BUN 17 03/01/2024 1121    CREATININE 0.84 03/01/2024 1121    Lab Results   Component Value Date/Time    CALCIUM 9.3 03/01/2024 1121    ALKPHOS 92 03/01/2024 1121    AST 14  03/01/2024 1121    ALT 6 (L) 03/01/2024 1121    BILITOT 0.7 03/01/2024 1121             ASSESSMENT/PLAN  Katelyn Weiss is a 89 y.o. female with a PMH of HTN, HLD, DM, hypothyroidism, anxiety, GERD ,COPD who presents to clinic for dysphagia.     Patient's dysphagia sounds oropharyngeal in nature (rice, medications getting stuck in the back of her throat, + cough).  I feel that the Schatzki's ring and prior dilation is a red herring and is not playing a role.  We will obtain a modified barium swallow.      Jerry Nelson, DO

## 2024-04-24 NOTE — TELEPHONE ENCOUNTER
Rx Refill Request     Name: Katelyn Weiss  :  1935   Medication Name:  Atorvastatin   Specific Pharmacy location:  [facility]   Date of last appointment:  3/4/2024  Date of next appointment:  6/3/2024  Best number to reach patient:  [phone]

## 2024-05-16 ENCOUNTER — HOSPITAL ENCOUNTER (OUTPATIENT)
Dept: RADIOLOGY | Facility: HOSPITAL | Age: 89
Discharge: HOME | End: 2024-05-16
Payer: MEDICARE

## 2024-05-16 DIAGNOSIS — R13.12 OROPHARYNGEAL DYSPHAGIA: ICD-10-CM

## 2024-05-16 PROCEDURE — 74230 X-RAY XM SWLNG FUNCJ C+: CPT | Performed by: RADIOLOGY

## 2024-05-16 PROCEDURE — 92611 MOTION FLUOROSCOPY/SWALLOW: CPT | Mod: GN

## 2024-05-16 PROCEDURE — 2500000005 HC RX 250 GENERAL PHARMACY W/O HCPCS: Performed by: FAMILY MEDICINE

## 2024-05-16 PROCEDURE — 74230 X-RAY XM SWLNG FUNCJ C+: CPT

## 2024-05-16 RX ADMIN — BARIUM SULFATE 165 ML: 0.81 POWDER, FOR SUSPENSION ORAL at 11:02

## 2024-05-16 RX ADMIN — BARIUM SULFATE 700 MG: 700 TABLET ORAL at 11:02

## 2024-05-16 NOTE — PROCEDURES
"Modified Barium Swallow Study     Patient Name: Katelyn Weiss  MRN: [id number]  : 1935  Today's Date: 24  Time Calculation  Start Time: 1020  Stop Time: 1055  Time Calculation (min): 35 min           Assessment/Impression:    Full detailed SLP/Radiologist Modified Barium Swallow study report can be found under Chart Review tab, Imaging tab and  titled \"FL Modified Barium Swallow Study\"      Patient presents with a mild oral dysphagia with solid foods due to prolonged mastication. She also had mild pharyngeal dysphagia with multiple sips of thin liquids resulting in penetration that cleared without evidence of aspiration. Recommending a regular diet with thin liquids and use of compensatory swallowing strategies i.e. small bites/sips and alternating bites/sips. Also recommended that patient continue to avoid foods that she has difficulty with such as peanuts and popcorn.]    Plan:    SLP Plan: No treatment needs identified at this time     Discussed POC: Patient   Discussed Risks/Benefits: Yes  Patient/Caregiver Agreeable: Yes    Education:   Pt. Educated on results of MBS study, recommended diet and recommended safe swallow strategies    "

## 2024-05-20 DIAGNOSIS — E78.2 MIXED HYPERLIPIDEMIA: ICD-10-CM

## 2024-05-20 RX ORDER — ATORVASTATIN CALCIUM 20 MG/1
20 TABLET, FILM COATED ORAL DAILY
Qty: 90 TABLET | Refills: 2 | Status: SHIPPED | OUTPATIENT
Start: 2024-05-20

## 2024-05-21 ENCOUNTER — TELEPHONE (OUTPATIENT)
Dept: GASTROENTEROLOGY | Facility: CLINIC | Age: 89
End: 2024-05-21
Payer: MEDICARE

## 2024-05-21 NOTE — TELEPHONE ENCOUNTER
Per Dr. Nelson, he would like patient to follow up ~4 weeks. [facility] for return call so I can schedule this.

## 2024-05-23 DIAGNOSIS — I20.89 ANGINA AT REST (HCC): ICD-10-CM

## 2024-05-23 NOTE — TELEPHONE ENCOUNTER
Requesting medication refill. Please approve or deny this request.    Rx requested:  Requested Prescriptions     Pending Prescriptions Disp Refills    metoprolol tartrate (LOPRESSOR) 25 MG tablet [Pharmacy Med Name: METOPROLOL TARTRATE 25MG TABLETS] 90 tablet 1     Sig: TAKE 1/2 TABLET BY MOUTH TWICE DAILY         Last Office Visit:   11/14/2023      Next Visit Date:  Future Appointments   Date Time Provider Department Center   11/19/2024 10:00 AM Holminh, DO TERESITA Gallardo [facility]               Last refill 05/30/2023. Please approve or deny.

## 2024-06-03 ENCOUNTER — LAB (OUTPATIENT)
Dept: LAB | Facility: LAB | Age: 89
End: 2024-06-03
Payer: MEDICARE

## 2024-06-03 ENCOUNTER — OFFICE VISIT (OUTPATIENT)
Dept: PRIMARY CARE | Facility: CLINIC | Age: 89
End: 2024-06-03
Payer: MEDICARE

## 2024-06-03 VITALS
HEIGHT: 65 IN | WEIGHT: 132 LBS | BODY MASS INDEX: 21.99 KG/M2 | RESPIRATION RATE: 16 BRPM | DIASTOLIC BLOOD PRESSURE: 62 MMHG | OXYGEN SATURATION: 99 % | TEMPERATURE: 96.1 F | HEART RATE: 50 BPM | SYSTOLIC BLOOD PRESSURE: 108 MMHG

## 2024-06-03 DIAGNOSIS — I10 ESSENTIAL HYPERTENSION: ICD-10-CM

## 2024-06-03 DIAGNOSIS — N18.31 TYPE 2 DIABETES MELLITUS WITH STAGE 3A CHRONIC KIDNEY DISEASE, WITHOUT LONG-TERM CURRENT USE OF INSULIN (MULTI): ICD-10-CM

## 2024-06-03 DIAGNOSIS — F41.1 GENERALIZED ANXIETY DISORDER: ICD-10-CM

## 2024-06-03 DIAGNOSIS — E78.2 MIXED HYPERLIPIDEMIA: ICD-10-CM

## 2024-06-03 DIAGNOSIS — N18.31 CHRONIC KIDNEY DISEASE, STAGE 3A (MULTI): ICD-10-CM

## 2024-06-03 DIAGNOSIS — D64.9 CHRONIC ANEMIA: ICD-10-CM

## 2024-06-03 DIAGNOSIS — E03.9 ACQUIRED HYPOTHYROIDISM: ICD-10-CM

## 2024-06-03 DIAGNOSIS — I25.10 CORONARY ARTERY DISEASE INVOLVING NATIVE CORONARY ARTERY OF NATIVE HEART WITHOUT ANGINA PECTORIS: ICD-10-CM

## 2024-06-03 DIAGNOSIS — E11.22 TYPE 2 DIABETES MELLITUS WITH STAGE 3A CHRONIC KIDNEY DISEASE, WITHOUT LONG-TERM CURRENT USE OF INSULIN (MULTI): ICD-10-CM

## 2024-06-03 DIAGNOSIS — N18.31 TYPE 2 DIABETES MELLITUS WITH STAGE 3A CHRONIC KIDNEY DISEASE, WITHOUT LONG-TERM CURRENT USE OF INSULIN (MULTI): Primary | ICD-10-CM

## 2024-06-03 DIAGNOSIS — E11.22 TYPE 2 DIABETES MELLITUS WITH STAGE 3A CHRONIC KIDNEY DISEASE, WITHOUT LONG-TERM CURRENT USE OF INSULIN (MULTI): Primary | ICD-10-CM

## 2024-06-03 LAB
ALBUMIN SERPL BCP-MCNC: 4.5 G/DL (ref 3.4–5)
ALP SERPL-CCNC: 77 U/L (ref 33–136)
ALT SERPL W P-5'-P-CCNC: 9 U/L (ref 7–45)
ANION GAP SERPL CALC-SCNC: 11 MMOL/L (ref 10–20)
AST SERPL W P-5'-P-CCNC: 15 U/L (ref 9–39)
BASOPHILS # BLD AUTO: 0.08 X10*3/UL (ref 0–0.1)
BASOPHILS NFR BLD AUTO: 1.6 %
BILIRUB SERPL-MCNC: 0.6 MG/DL (ref 0–1.2)
BUN SERPL-MCNC: 13 MG/DL (ref 6–23)
CALCIUM SERPL-MCNC: 9.3 MG/DL (ref 8.6–10.3)
CHLORIDE SERPL-SCNC: 94 MMOL/L (ref 98–107)
CHOLEST SERPL-MCNC: 132 MG/DL (ref 0–199)
CHOLESTEROL/HDL RATIO: 2.9
CO2 SERPL-SCNC: 31 MMOL/L (ref 21–32)
CREAT SERPL-MCNC: 0.84 MG/DL (ref 0.5–1.05)
EGFRCR SERPLBLD CKD-EPI 2021: 67 ML/MIN/1.73M*2
EOSINOPHIL # BLD AUTO: 0.16 X10*3/UL (ref 0–0.4)
EOSINOPHIL NFR BLD AUTO: 3.2 %
ERYTHROCYTE [DISTWIDTH] IN BLOOD BY AUTOMATED COUNT: 12.2 % (ref 11.5–14.5)
EST. AVERAGE GLUCOSE BLD GHB EST-MCNC: 128 MG/DL
GLUCOSE SERPL-MCNC: 99 MG/DL (ref 74–99)
HBA1C MFR BLD: 6.1 %
HCT VFR BLD AUTO: 35.9 % (ref 36–46)
HDLC SERPL-MCNC: 45.9 MG/DL
HGB BLD-MCNC: 12.1 G/DL (ref 12–16)
IMM GRANULOCYTES # BLD AUTO: 0.01 X10*3/UL (ref 0–0.5)
IMM GRANULOCYTES NFR BLD AUTO: 0.2 % (ref 0–0.9)
LDLC SERPL CALC-MCNC: 61 MG/DL
LYMPHOCYTES # BLD AUTO: 1.49 X10*3/UL (ref 0.8–3)
LYMPHOCYTES NFR BLD AUTO: 29.6 %
MCH RBC QN AUTO: 30.8 PG (ref 26–34)
MCHC RBC AUTO-ENTMCNC: 33.7 G/DL (ref 32–36)
MCV RBC AUTO: 91 FL (ref 80–100)
MONOCYTES # BLD AUTO: 0.42 X10*3/UL (ref 0.05–0.8)
MONOCYTES NFR BLD AUTO: 8.3 %
NEUTROPHILS # BLD AUTO: 2.87 X10*3/UL (ref 1.6–5.5)
NEUTROPHILS NFR BLD AUTO: 57.1 %
NON HDL CHOLESTEROL: 86 MG/DL (ref 0–149)
NRBC BLD-RTO: 0 /100 WBCS (ref 0–0)
PLATELET # BLD AUTO: 326 X10*3/UL (ref 150–450)
POTASSIUM SERPL-SCNC: 5 MMOL/L (ref 3.5–5.3)
PROT SERPL-MCNC: 6.7 G/DL (ref 6.4–8.2)
RBC # BLD AUTO: 3.93 X10*6/UL (ref 4–5.2)
SODIUM SERPL-SCNC: 131 MMOL/L (ref 136–145)
T4 FREE SERPL-MCNC: 1.1 NG/DL (ref 0.61–1.12)
TRIGL SERPL-MCNC: 126 MG/DL (ref 0–149)
TSH SERPL-ACNC: 3.15 MIU/L (ref 0.44–3.98)
VLDL: 25 MG/DL (ref 0–40)
WBC # BLD AUTO: 5 X10*3/UL (ref 4.4–11.3)

## 2024-06-03 PROCEDURE — 3074F SYST BP LT 130 MM HG: CPT | Performed by: FAMILY MEDICINE

## 2024-06-03 PROCEDURE — 1160F RVW MEDS BY RX/DR IN RCRD: CPT | Performed by: FAMILY MEDICINE

## 2024-06-03 PROCEDURE — 84443 ASSAY THYROID STIM HORMONE: CPT

## 2024-06-03 PROCEDURE — 36415 COLL VENOUS BLD VENIPUNCTURE: CPT

## 2024-06-03 PROCEDURE — 85025 COMPLETE CBC W/AUTO DIFF WBC: CPT

## 2024-06-03 PROCEDURE — 99214 OFFICE O/P EST MOD 30 MIN: CPT | Performed by: FAMILY MEDICINE

## 2024-06-03 PROCEDURE — 1159F MED LIST DOCD IN RCRD: CPT | Performed by: FAMILY MEDICINE

## 2024-06-03 PROCEDURE — 1036F TOBACCO NON-USER: CPT | Performed by: FAMILY MEDICINE

## 2024-06-03 PROCEDURE — 1123F ACP DISCUSS/DSCN MKR DOCD: CPT | Performed by: FAMILY MEDICINE

## 2024-06-03 PROCEDURE — 80053 COMPREHEN METABOLIC PANEL: CPT

## 2024-06-03 PROCEDURE — 80061 LIPID PANEL: CPT

## 2024-06-03 PROCEDURE — 83036 HEMOGLOBIN GLYCOSYLATED A1C: CPT

## 2024-06-03 PROCEDURE — 3078F DIAST BP <80 MM HG: CPT | Performed by: FAMILY MEDICINE

## 2024-06-03 PROCEDURE — 84439 ASSAY OF FREE THYROXINE: CPT

## 2024-06-03 RX ORDER — ALPRAZOLAM 1 MG/1
1 TABLET ORAL 2 TIMES DAILY PRN
Qty: 60 TABLET | Refills: 1 | Status: SHIPPED | OUTPATIENT
Start: 2024-06-03

## 2024-06-03 RX ORDER — NITROGLYCERIN 0.4 MG/1
0.4 TABLET SUBLINGUAL EVERY 5 MIN PRN
Qty: 25 TABLET | Refills: 1 | Status: SHIPPED | OUTPATIENT
Start: 2024-06-03

## 2024-06-03 RX ORDER — BLOOD SUGAR DIAGNOSTIC
STRIP MISCELLANEOUS
Qty: 300 STRIP | Refills: 3 | Status: SHIPPED | OUTPATIENT
Start: 2024-06-03

## 2024-06-03 ASSESSMENT — ANXIETY QUESTIONNAIRES
7. FEELING AFRAID AS IF SOMETHING AWFUL MIGHT HAPPEN: NOT AT ALL
2. NOT BEING ABLE TO STOP OR CONTROL WORRYING: NOT AT ALL
GAD7 TOTAL SCORE: 3
IF YOU CHECKED OFF ANY PROBLEMS ON THIS QUESTIONNAIRE, HOW DIFFICULT HAVE THESE PROBLEMS MADE IT FOR YOU TO DO YOUR WORK, TAKE CARE OF THINGS AT HOME, OR GET ALONG WITH OTHER PEOPLE: NOT DIFFICULT AT ALL
6. BECOMING EASILY ANNOYED OR IRRITABLE: SEVERAL DAYS
1. FEELING NERVOUS, ANXIOUS, OR ON EDGE: MORE THAN HALF THE DAYS
4. TROUBLE RELAXING: NOT AT ALL
3. WORRYING TOO MUCH ABOUT DIFFERENT THINGS: NOT AT ALL
5. BEING SO RESTLESS THAT IT IS HARD TO SIT STILL: NOT AT ALL

## 2024-06-03 ASSESSMENT — ENCOUNTER SYMPTOMS
TREMORS: 0
VISUAL CHANGE: 0
NERVOUS/ANXIOUS: 0
SORE THROAT: 0
WHEEZING: 0
DECREASED CONCENTRATION: 0
MALAISE: 0
FREQUENCY: 0
DIARRHEA: 0
HEMATURIA: 0
FATIGUE: 0
FEVER: 0
POLYPHAGIA: 0
DIZZINESS: 0
INSOMNIA: 0
ABDOMINAL PAIN: 0
CONSTIPATION: 0
HEADACHES: 0
CHILLS: 0
RHINORRHEA: 0
RESTLESSNESS: 0
SHORTNESS OF BREATH: 0
THOUGHT CONTENT - OBSESSIONS: 0
COUGH: 0
BLURRED VISION: 0
DYSURIA: 0
FEELING OF CHOKING: 0
HYPERVENTILATION: 0
IRRITABILITY: 0
MUSCLE TENSION: 0
NUMBNESS: 0
WEIGHT LOSS: 0
VOMITING: 0
POLYDIPSIA: 0
CONFUSION: 0
WEAKNESS: 0
NAUSEA: 1
PALPITATIONS: 0
DEPRESSED MOOD: 0
COMPULSIONS: 0
PANIC: 0

## 2024-06-03 NOTE — PATIENT INSTRUCTIONS

## 2024-06-03 NOTE — PROGRESS NOTES
Chief Complaint   Patient presents with    Follow-up     Diabetes, Hypertension, Hyperlipidemia, Hypothyroidism and Anxiety       Subjective   Patient ID: Katelyn Weiss is a 89 y.o. female who presents for Follow-up (Diabetes, Hypertension, Hyperlipidemia, Hypothyroidism and Anxiety).    Patient is here for follow-up on hypertension and hyperlipidemia. She has no chest pain, dyspnea, exertional chest pressure/discomfort, near-syncope, orthopnea, palpitations, paroxysmal nocturnal dyspnea, and syncope. Taking her medication regularly with no side effects.    She presents for follow up of hypothyroidism. Current symptoms: none. She has no change in energy level, diarrhea, heat / cold intolerance, nervousness, palpitations, or weight changes.     Diabetes  She presents for her follow-up diabetic visit. She has type 2 diabetes mellitus. Pertinent negatives for hypoglycemia include no confusion, dizziness, headaches, nervousness/anxiousness or tremors. Pertinent negatives for diabetes include no blurred vision, no chest pain, no fatigue, no foot paresthesias, no foot ulcerations, no polydipsia, no polyphagia, no polyuria, no visual change, no weakness and no weight loss. Risk factors for coronary artery disease include diabetes mellitus, dyslipidemia and hypertension.   Anxiety  Presents for follow-up visit. Symptoms include nausea. Patient reports no chest pain, compulsions, confusion, decreased concentration, depressed mood, dizziness, dry mouth, excessive worry, feeling of choking, hyperventilation, insomnia, irritability, malaise, muscle tension, nervous/anxious behavior, obsessions, palpitations, panic, restlessness, shortness of breath or suicidal ideas. Symptoms occur occasionally. The severity of symptoms is mild. The patient sleeps 6 hours per night. The quality of sleep is good. Nighttime awakenings: occasional.          Review of Systems   Constitutional:  Negative for chills, fatigue, fever, irritability and  "weight loss.   HENT:  Negative for congestion, ear pain, nosebleeds, rhinorrhea and sore throat.    Eyes:  Negative for blurred vision.   Respiratory:  Negative for cough, shortness of breath and wheezing.    Cardiovascular:  Negative for chest pain, palpitations and leg swelling.   Gastrointestinal:  Positive for nausea. Negative for abdominal pain, constipation, diarrhea and vomiting.   Endocrine: Negative for polydipsia, polyphagia and polyuria.   Genitourinary:  Negative for dysuria, frequency and hematuria.   Neurological:  Negative for dizziness, tremors, weakness, numbness and headaches.   Psychiatric/Behavioral:  Negative for confusion, decreased concentration and suicidal ideas. The patient is not nervous/anxious and does not have insomnia.        Objective   /62   Pulse 50   Temp 35.6 °C (96.1 °F)   Resp 16   Ht 1.651 m (5' 5\")   Wt 59.9 kg (132 lb)   SpO2 99%   BMI 21.97 kg/m²     Physical Exam  Constitutional:       General: She is not in acute distress.     Appearance: Normal appearance.   HENT:      Head: Normocephalic and atraumatic.      Mouth/Throat:      Mouth: Mucous membranes are moist.      Pharynx: Oropharynx is clear. No oropharyngeal exudate or posterior oropharyngeal erythema.   Eyes:      General: No scleral icterus.     Extraocular Movements: Extraocular movements intact.      Pupils: Pupils are equal, round, and reactive to light.   Cardiovascular:      Rate and Rhythm: Normal rate and regular rhythm.      Pulses: Normal pulses.      Heart sounds: No murmur heard.     No friction rub. No gallop.   Pulmonary:      Effort: Pulmonary effort is normal.      Breath sounds: No wheezing, rhonchi or rales.   Skin:     General: Skin is warm.      Coloration: Skin is not jaundiced or pale.      Findings: No erythema or rash.   Neurological:      General: No focal deficit present.      Mental Status: She is alert and oriented to person, place, and time.      Cranial Nerves: No cranial " nerve deficit.      Sensory: No sensory deficit.      Coordination: Coordination normal.      Gait: Gait normal.         Assessment/Plan   Problem List Items Addressed This Visit       Acquired hypothyroidism     Well-controlled, continue current medications and management.         Relevant Orders    Thyroid Stimulating Hormone    Thyroxine, Free    Follow Up In [facility] - PCP - Established    Chronic anemia     Stable, continue current medications and management.         Chronic kidney disease, stage 3a (Multi)     Stable, continue current medications and management.         Coronary artery disease involving native coronary artery of native heart without angina pectoris     Continue current medications and management.         Relevant Medications    nitroglycerin (Nitrostat) 0.4 mg SL tablet    Essential hypertension     Well-controlled, continue current medications and management.         Relevant Orders    CBC and Auto Differential    Comprehensive Metabolic Panel    Lipid Panel    Hemoglobin A1C    Follow Up In [facility] - PCP - Established    Generalized anxiety disorder     Stable, continue current medications and management.  The risks and benefits of my recommendations, as well as other treatment options were discussed with the patient today.    The side effects of the medications were discussed.  Advised not to take the medication with alcohol .  Exercise regularly and this can give you a sense of well being and help decrease feelings of anxiety.;  Get plenty of sleep. Sleep rests your brain as well as your body, and can improve your general sense of wellbeing as well as your mood.;  Avoid alcohol and drug abuse. It may seem that alcohol or drugs relax you. But in the long run they make anxiety worse and cause more problems.;  Avoid caffeine. Caffeine is found in coffee, tea, soft drinks and chocolate. Caffeine may increase your sense of anxiety because it stimulates your nervous  system. Also avoid over-the-counter diet pills, and cough and cold medicines that contain a decongestant.;  Confront the things that have made you anxious in the past. Begin by just picturing yourself confronting these things. By doing this, you can get used to the idea of confronting the things that make you anxious before you actually do it. After you feel more comfortable picturing yourself confronting these things, you can begin to actually face them.;  If you feel yourself getting anxious, practice a relaxation technique or focus on a simple task, such as counting backward from 100 to 0.;  Although feelings of anxiety are scary, they won't hurt you. Label the level of your fear from 0 to 10 and keep track as it goes up and down. Notice that it doesn't stay at a very high level for more than a few seconds. When the fear comes, accept it. Wait and give it time to pass without running away from it.;  Take medications as prescribed.         Relevant Medications    ALPRAZolam (Xanax) 1 mg tablet    Other Relevant Orders    Follow Up In [facility] - PCP - Established    Mixed hyperlipidemia     The nature of cardiac risk has been fully discussed with this patient. Discussed cardiovascular risk analysis and appropriate diet with the need for lifelong measures to reduce the risk. A regular exercise program is recommended to help achieve and maintain normal body weight, fitness and improve lipid balance. Patient education provided. They understand and agree with this course of treatment. They will return with new or worsening symptoms. Patient instructed to remain current with appropriate annual health maintenance.          Relevant Orders    CBC and Auto Differential    Comprehensive Metabolic Panel    Lipid Panel    Hemoglobin A1C    Follow Up In [facility] - PCP - Established    Type 2 diabetes mellitus with stage 3a chronic kidney disease, without long-term current use of insulin (Multi) -  Primary     Diabetes Mellitus type II, under good control.  1. Rx changes: None  2. Education: Reviewed ‘ABCs’ of diabetes management (respective goals in parentheses):  A1C (<7), blood pressure (<130/80), and cholesterol (LDL <100).  3. Compliance at present is estimated to be good. Efforts to improve compliance (if necessary) will be directed at dietary modifications: and increased exercise.  4. Follow up: 3 months         Relevant Medications    OneTouch Ultra Test strip    Other Relevant Orders    CBC and Auto Differential    Comprehensive Metabolic Panel    Lipid Panel    Hemoglobin A1C    Follow Up In [facility] - PCP - Established     OARRS:  Darius Quezada MD on 6/3/2024 11:55 AM  I have personally reviewed the OARRS report for Katelyn Weiss. I have considered the risks of abuse, dependence, addiction and diversion    Is the patient prescribed a combination of a benzodiazepine and opioid?  No    Last Urine Drug Screen / ordered today: No  Recent Results (from the past 8760 hour(s))   OPIATE/OPIOID/BENZO PRESCRIPTION COMPLIANCE    Collection Time: 09/11/23 12:21 PM   Result Value Ref Range    DRUG SCREEN COMMENT URINE SEE BELOW     Creatine, Urine 129.0 mg/dL    Amphetamine Screen, Urine PRESUMPTIVE NEGATIVE NEGATIVE    Barbiturate Screen, Urine PRESUMPTIVE NEGATIVE NEGATIVE    Cannabinoid Screen, Urine PRESUMPTIVE NEGATIVE NEGATIVE    Cocaine Screen, Urine PRESUMPTIVE NEGATIVE NEGATIVE    PCP Screen, Urine PRESUMPTIVE NEGATIVE NEGATIVE    7-Aminoclonazepam <25 Cutoff <25 ng/mL    Alpha-Hydroxyalprazolam 805 (A) Cutoff <25 ng/mL    Alpha-Hydroxymidazolam <25 Cutoff <25 ng/mL    Alprazolam 269 (A) Cutoff <25 ng/mL    Chlordiazepoxide <25 Cutoff <25 ng/mL    Clonazepam <25 Cutoff <25 ng/mL    Diazepam <25 Cutoff <25 ng/mL    Lorazepam <25 Cutoff <25 ng/mL    Midazolam <25 Cutoff <25 ng/mL    Nordiazepam <25 Cutoff <25 ng/mL    Oxazepam <25 Cutoff <25 ng/mL    Temazepam <25 Cutoff <25 ng/mL     Zolpidem <25 Cutoff <25 ng/mL    Zolpidem Metabolite (ZCA) <25 Cutoff <25 ng/mL    6-Acetylmorphine <25 Cutoff <25 ng/mL    Codeine <50 Cutoff <50 ng/mL    Hydrocodone <25 Cutoff <25 ng/mL    Hydromorphone <25 Cutoff <25 ng/mL    Morphine Urine <50 Cutoff <50 ng/mL    Norhydrocodone <25 Cutoff <25 ng/mL    Noroxycodone <25 Cutoff <25 ng/mL    Oxycodone <25 Cutoff <25 ng/mL    Oxymorphone <25 Cutoff <25 ng/mL    Tramadol <50 Cutoff <50 ng/mL    O-Desmethyltramadol <50 Cutoff <50 ng/mL    Fentanyl <2.5 Cutoff<2.5 ng/mL    Norfentanyl <2.5 Cutoff<2.5 ng/mL    METHADONE CONFIRMATION,URINE <25 Cutoff <25 ng/mL    EDDP <25 Cutoff <25 ng/mL     Controlled Substance Agreement:  Date of the Last Agreement: 3/4/2024  Reviewed Controlled Substance Agreement including but not limited to the benefits, risks, and alternatives to treatment with a Controlled Substance medication(s).    Benzodiazepines:  What is the patient's goal of therapy? Able to function without being unduly anxious and carry out day-to-day activities without tension and panic attacks     Is this being achieved with current treatment? yes    JOSE-7:  Over the last 2 weeks, how often have you been bothered by any of the following problems?  Feeling nervous, anxious, or on edge: 2  Not being able to stop or control worryin  Worrying too much about different things: 0  Trouble relaxin  Being so restless that it is hard to sit still: 0  Becoming easily annoyed or irritable: 1  Feeling afraid as if something awful might happen: 0  JOSE-7 Total Score: 3    Activities of Daily Living:   Is your overall impression that this patient is benefiting (symptom reduction outweighs side effects) from benzodiazepine therapy? Yes     1. Physical Functioning: Better  2. Family Relationship: Better  3. Social Relationship: Better  4. Mood: Better  5. Sleep Patterns: Better  6. Overall Function: Better    Scribe Attestation  By signing my name below, I, Eze Weiner,  Scribe   attest that this documentation has been prepared under the direction and in the presence of Darius Quezada MD.    Patient was identified as a fall risk. Risk prevention instructions provided.

## 2024-06-04 DIAGNOSIS — N18.31 TYPE 2 DIABETES MELLITUS WITH STAGE 3A CHRONIC KIDNEY DISEASE, WITHOUT LONG-TERM CURRENT USE OF INSULIN (MULTI): ICD-10-CM

## 2024-06-04 DIAGNOSIS — E11.22 TYPE 2 DIABETES MELLITUS WITH STAGE 3A CHRONIC KIDNEY DISEASE, WITHOUT LONG-TERM CURRENT USE OF INSULIN (MULTI): ICD-10-CM

## 2024-06-04 DIAGNOSIS — E03.9 ACQUIRED HYPOTHYROIDISM: ICD-10-CM

## 2024-06-04 DIAGNOSIS — E78.2 MIXED HYPERLIPIDEMIA: ICD-10-CM

## 2024-06-18 NOTE — PROGRESS NOTES
"CC: Follow-up of dysphagia.    History of Present Illness:   Katelyn Weiss is a 89 y.o. female with a PMH of HTN, HLD, DM, hypothyroidism, anxiety, GERD ,COPD who presents to clinic for follow-up of dysphagia.  Patient underwent modified barium swallow since her last visit.  It showed esophageal dysmotility, hiatal hernia, and a Schatzki's ring.  It also showed some stasis at the level of the aortic notch at the level of the lower esophageal sphincter. She states her swallowing is better. She is having no problem.     GI visit 4/2024: \"Patient complains of small food items and medications getting stuck in the back of her throat.  This will also elicit cough.  She does not feel like food items get stuck once they make it to her esophagus.  She has no other GI complaints at this time.  She is a former .\"    EGD 2019: 1 cm hiatal hernia, esophageal spasm, low grade narrowing esophageal Schatzki's ring s/p dilation to 20 mm.    Review of Systems  ROS Negative unless otherwise stated above.    Past Medical/Surgical History  Past Medical History:   Diagnosis Date    Acute sinusitis, unspecified 04/24/2019    Acute non-recurrent sinusitis, unspecified location    Encounter for general adult medical examination without abnormal findings 02/19/2020    Routine general medical examination at health care facility    Personal history of other endocrine, nutritional and metabolic disease     History of thyroid disorder      Past Surgical History:   Procedure Laterality Date    MR CHEST ANGIO W IV CONTRAST  12/5/2014    MR CHEST ANGIO W IV CONTRAST 12/5/2014 [facility]CILL[facility] LEGAC[facility]    OTHER SURGICAL HISTORY  02/27/2019    Umbilical hernia repair        Social History   reports that she has never smoked. She has never used smokeless tobacco. She reports that she does not drink alcohol and does not use drugs.     Family History  family history includes Aneurysm in her father; Colon cancer in her daughter. "     Allergies  Allergies   Allergen Reactions    Bee Venom Protein (Honey Bee) Anaphylaxis    Losartan Potassium Unknown    Sulfa (Sulfonamide Antibiotics) Unknown    Sulfamethoxazole Unknown       Medications  Current Outpatient Medications   Medication Instructions    albuterol 90 mcg/actuation inhaler inhalation, INHALE 1 TO 2 PUFFS EVERY 4 TO 6 HOURS AS NEEDED.    ALPRAZolam (XANAX) 1 mg, oral, 2 times daily PRN    aspirin 81 mg chewable tablet oral    atorvastatin (LIPITOR) 20 mg, oral, Daily    bimatoprost (Lumigan) 0.03 % ophthalmic solution ophthalmic (eye)    cyanocobalamin (Vitamin B-12) 1,000 mcg/mL injection 1 mL, Every 14 days, Inject    cyanocobalamin (VITAMIN B-12) 1,000 mcg, oral, Daily    docusate sodium (COLACE) 100 mg, oral, 2 times daily    fluticasone (Flonase) 50 mcg/actuation nasal spray 2 sprays, Each Nostril, Daily, SHAKE LIQUID    lancets 33 gauge misc miscellaneous    levothyroxine (SYNTHROID, LEVOXYL) 50 mcg, oral, Daily before breakfast    meloxicam (MOBIC) 15 mg, oral, Daily, Take with food    metFORMIN XR (GLUCOPHAGE-XR) 500 mg, oral, Daily with evening meal    metoprolol tartrate (LOPRESSOR) 12.5 mg, oral, 2 times daily    NIFEdipine ER (ADALAT CC) 30 mg, oral, Daily    nitroglycerin (NITROSTAT) 0.4 mg, sublingual, Every 5 min PRN    OneTouch Ultra Test strip TEST 3 TIMES DAILY.TEST 3 TIMES DAILY.    pantoprazole (PROTONIX) 40 mg, oral, Daily    tamsulosin (FLOMAX) 0.4 mg, oral, Daily    timolol (Timoptic) 0.5 % ophthalmic solution 1 drop, Both Eyes, Daily        Lab Results   Component Value Date    WBC 5.0 06/03/2024    HGB 12.1 06/03/2024    HCT 35.9 (L) 06/03/2024    MCV 91 06/03/2024     06/03/2024       Chemistry    Lab Results   Component Value Date/Time     (L) 06/03/2024 1059    K 5.0 06/03/2024 1059    CL 94 (L) 06/03/2024 1059    CO2 31 06/03/2024 1059    BUN 13 06/03/2024 1059    CREATININE 0.84 06/03/2024 1059    Lab Results   Component Value Date/Time     CALCIUM 9.3 06/03/2024 1059    ALKPHOS 77 06/03/2024 1059    AST 15 06/03/2024 1059    ALT 9 06/03/2024 1059    BILITOT 0.6 06/03/2024 1059             ASSESSMENT/PLAN  Katelyn Weiss is a 89 y.o. female with a PMH of HTN, HLD, DM, hypothyroidism, anxiety, GERD ,COPD who presents to clinic for follow-up of dysphagia.  Patient underwent modified barium swallow since her last visit.  It showed esophageal dysmotility, hiatal hernia, and a Schatzki's ring.  It also showed some stasis at the level of the aortic notch at the level of the lower esophageal sphincter. She states her swallowing is better. She is having no problem.     Will continue to manage conservatively for now (joint decision making).  If symptoms recur, we will proceed with EGD.      Total telephone time: 5 minutes.    Jerry Nelson, DO

## 2024-06-19 ENCOUNTER — APPOINTMENT (OUTPATIENT)
Dept: GASTROENTEROLOGY | Facility: CLINIC | Age: 89
End: 2024-06-19
Payer: MEDICARE

## 2024-06-19 DIAGNOSIS — R13.10 DYSPHAGIA, UNSPECIFIED TYPE: Primary | ICD-10-CM

## 2024-06-19 PROCEDURE — 1123F ACP DISCUSS/DSCN MKR DOCD: CPT | Performed by: STUDENT IN AN ORGANIZED HEALTH CARE EDUCATION/TRAINING PROGRAM

## 2024-06-19 PROCEDURE — 99441 PR PHYS/QHP TELEPHONE EVALUATION 5-10 MIN: CPT | Performed by: STUDENT IN AN ORGANIZED HEALTH CARE EDUCATION/TRAINING PROGRAM

## 2024-07-02 DIAGNOSIS — J44.1 ACUTE EXACERBATION OF CHRONIC OBSTRUCTIVE PULMONARY DISEASE (COPD) (MULTI): Primary | ICD-10-CM

## 2024-07-02 RX ORDER — ALBUTEROL SULFATE 90 UG/1
AEROSOL, METERED RESPIRATORY (INHALATION)
Qty: 18 G | Refills: 5 | Status: SHIPPED | OUTPATIENT
Start: 2024-07-02

## 2024-07-02 NOTE — TELEPHONE ENCOUNTER
Rx Refill Request Telephone Encounter    Name:  Katelyn Weiss  :  [id number]  Medication Name:  albuterol 90 mcg/actuation inhaler     Specific Pharmacy location:  [facility]  Date of last appointment:  6/3/24  Date of next appointment:  24  Best number to reach patient:  [phone]

## 2024-08-06 ENCOUNTER — APPOINTMENT (OUTPATIENT)
Dept: RADIOLOGY | Facility: HOSPITAL | Age: 89
End: 2024-08-06
Payer: MEDICARE

## 2024-08-06 ENCOUNTER — HOSPITAL ENCOUNTER (EMERGENCY)
Facility: HOSPITAL | Age: 89
Discharge: HOME | End: 2024-08-06
Attending: EMERGENCY MEDICINE
Payer: MEDICARE

## 2024-08-06 VITALS
DIASTOLIC BLOOD PRESSURE: 81 MMHG | WEIGHT: 125 LBS | SYSTOLIC BLOOD PRESSURE: 171 MMHG | OXYGEN SATURATION: 97 % | TEMPERATURE: 98.1 F | HEIGHT: 65 IN | BODY MASS INDEX: 20.83 KG/M2 | HEART RATE: 63 BPM | RESPIRATION RATE: 20 BRPM

## 2024-08-06 DIAGNOSIS — J20.9 ACUTE BRONCHITIS, UNSPECIFIED ORGANISM: Primary | ICD-10-CM

## 2024-08-06 DIAGNOSIS — R91.1 LUNG NODULE: ICD-10-CM

## 2024-08-06 DIAGNOSIS — R04.2 HEMOPTYSIS: ICD-10-CM

## 2024-08-06 LAB
ALBUMIN SERPL BCP-MCNC: 4.1 G/DL (ref 3.4–5)
ALP SERPL-CCNC: 74 U/L (ref 33–136)
ALT SERPL W P-5'-P-CCNC: 12 U/L (ref 7–45)
ANION GAP SERPL CALC-SCNC: 11 MMOL/L (ref 10–20)
AST SERPL W P-5'-P-CCNC: 18 U/L (ref 9–39)
BASOPHILS # BLD AUTO: 0.05 X10*3/UL (ref 0–0.1)
BASOPHILS NFR BLD AUTO: 0.7 %
BILIRUB SERPL-MCNC: 0.6 MG/DL (ref 0–1.2)
BNP SERPL-MCNC: 83 PG/ML (ref 0–99)
BUN SERPL-MCNC: 17 MG/DL (ref 6–23)
CALCIUM SERPL-MCNC: 9 MG/DL (ref 8.6–10.3)
CARDIAC TROPONIN I PNL SERPL HS: 4 NG/L (ref 0–13)
CHLORIDE SERPL-SCNC: 98 MMOL/L (ref 98–107)
CO2 SERPL-SCNC: 30 MMOL/L (ref 21–32)
CREAT SERPL-MCNC: 0.78 MG/DL (ref 0.5–1.05)
EGFRCR SERPLBLD CKD-EPI 2021: 73 ML/MIN/1.73M*2
EOSINOPHIL # BLD AUTO: 0.11 X10*3/UL (ref 0–0.4)
EOSINOPHIL NFR BLD AUTO: 1.6 %
ERYTHROCYTE [DISTWIDTH] IN BLOOD BY AUTOMATED COUNT: 12.4 % (ref 11.5–14.5)
GLUCOSE BLD MANUAL STRIP-MCNC: 86 MG/DL (ref 74–99)
GLUCOSE SERPL-MCNC: 85 MG/DL (ref 74–99)
HCT VFR BLD AUTO: 34 % (ref 36–46)
HGB BLD-MCNC: 11.6 G/DL (ref 12–16)
IMM GRANULOCYTES # BLD AUTO: 0.02 X10*3/UL (ref 0–0.5)
IMM GRANULOCYTES NFR BLD AUTO: 0.3 % (ref 0–0.9)
INR PPP: 1 (ref 0.9–1.1)
LYMPHOCYTES # BLD AUTO: 1.51 X10*3/UL (ref 0.8–3)
LYMPHOCYTES NFR BLD AUTO: 21.4 %
MAGNESIUM SERPL-MCNC: 1.21 MG/DL (ref 1.6–2.4)
MCH RBC QN AUTO: 30.1 PG (ref 26–34)
MCHC RBC AUTO-ENTMCNC: 34.1 G/DL (ref 32–36)
MCV RBC AUTO: 88 FL (ref 80–100)
MONOCYTES # BLD AUTO: 0.45 X10*3/UL (ref 0.05–0.8)
MONOCYTES NFR BLD AUTO: 6.4 %
NEUTROPHILS # BLD AUTO: 4.9 X10*3/UL (ref 1.6–5.5)
NEUTROPHILS NFR BLD AUTO: 69.6 %
NRBC BLD-RTO: 0 /100 WBCS (ref 0–0)
PLATELET # BLD AUTO: 316 X10*3/UL (ref 150–450)
POTASSIUM SERPL-SCNC: 3.8 MMOL/L (ref 3.5–5.3)
PROT SERPL-MCNC: 7.1 G/DL (ref 6.4–8.2)
PROTHROMBIN TIME: 11.8 SECONDS (ref 9.8–12.8)
RBC # BLD AUTO: 3.85 X10*6/UL (ref 4–5.2)
SODIUM SERPL-SCNC: 135 MMOL/L (ref 136–145)
WBC # BLD AUTO: 7 X10*3/UL (ref 4.4–11.3)

## 2024-08-06 PROCEDURE — 36415 COLL VENOUS BLD VENIPUNCTURE: CPT | Performed by: NURSE PRACTITIONER

## 2024-08-06 PROCEDURE — 71275 CT ANGIOGRAPHY CHEST: CPT

## 2024-08-06 PROCEDURE — 83735 ASSAY OF MAGNESIUM: CPT | Performed by: NURSE PRACTITIONER

## 2024-08-06 PROCEDURE — 2500000004 HC RX 250 GENERAL PHARMACY W/ HCPCS (ALT 636 FOR OP/ED): Performed by: NURSE PRACTITIONER

## 2024-08-06 PROCEDURE — 85610 PROTHROMBIN TIME: CPT | Performed by: NURSE PRACTITIONER

## 2024-08-06 PROCEDURE — 84484 ASSAY OF TROPONIN QUANT: CPT | Performed by: NURSE PRACTITIONER

## 2024-08-06 PROCEDURE — 2550000001 HC RX 255 CONTRASTS: Performed by: NURSE PRACTITIONER

## 2024-08-06 PROCEDURE — 85025 COMPLETE CBC W/AUTO DIFF WBC: CPT | Performed by: NURSE PRACTITIONER

## 2024-08-06 PROCEDURE — 82947 ASSAY GLUCOSE BLOOD QUANT: CPT

## 2024-08-06 PROCEDURE — 71275 CT ANGIOGRAPHY CHEST: CPT | Performed by: RADIOLOGY

## 2024-08-06 PROCEDURE — 99285 EMERGENCY DEPT VISIT HI MDM: CPT | Mod: 25

## 2024-08-06 PROCEDURE — 83880 ASSAY OF NATRIURETIC PEPTIDE: CPT | Performed by: NURSE PRACTITIONER

## 2024-08-06 PROCEDURE — 96365 THER/PROPH/DIAG IV INF INIT: CPT

## 2024-08-06 PROCEDURE — 80053 COMPREHEN METABOLIC PANEL: CPT | Performed by: NURSE PRACTITIONER

## 2024-08-06 RX ORDER — MAGNESIUM SULFATE HEPTAHYDRATE 40 MG/ML
2 INJECTION, SOLUTION INTRAVENOUS ONCE
Status: COMPLETED | OUTPATIENT
Start: 2024-08-06 | End: 2024-08-06

## 2024-08-06 RX ORDER — AZITHROMYCIN 250 MG/1
TABLET, FILM COATED ORAL
Qty: 6 TABLET | Refills: 0 | Status: SHIPPED | OUTPATIENT
Start: 2024-08-06

## 2024-08-06 RX ORDER — AZITHROMYCIN 250 MG/1
TABLET, FILM COATED ORAL
Qty: 6 TABLET | Refills: 0 | Status: SHIPPED | OUTPATIENT
Start: 2024-08-06 | End: 2024-08-06

## 2024-08-06 RX ORDER — PREDNISONE 20 MG/1
40 TABLET ORAL DAILY
Qty: 10 TABLET | Refills: 0 | Status: SHIPPED | OUTPATIENT
Start: 2024-08-06 | End: 2024-08-06

## 2024-08-06 RX ORDER — PREDNISONE 20 MG/1
40 TABLET ORAL DAILY
Qty: 10 TABLET | Refills: 0 | Status: SHIPPED | OUTPATIENT
Start: 2024-08-06 | End: 2024-08-11

## 2024-08-06 ASSESSMENT — PAIN - FUNCTIONAL ASSESSMENT: PAIN_FUNCTIONAL_ASSESSMENT: 0-10

## 2024-08-06 ASSESSMENT — LIFESTYLE VARIABLES
HAVE YOU EVER FELT YOU SHOULD CUT DOWN ON YOUR DRINKING: NO
TOTAL SCORE: 0
EVER HAD A DRINK FIRST THING IN THE MORNING TO STEADY YOUR NERVES TO GET RID OF A HANGOVER: NO
EVER FELT BAD OR GUILTY ABOUT YOUR DRINKING: NO
HAVE PEOPLE ANNOYED YOU BY CRITICIZING YOUR DRINKING: NO

## 2024-08-06 ASSESSMENT — COLUMBIA-SUICIDE SEVERITY RATING SCALE - C-SSRS
2. HAVE YOU ACTUALLY HAD ANY THOUGHTS OF KILLING YOURSELF?: NO
1. IN THE PAST MONTH, HAVE YOU WISHED YOU WERE DEAD OR WISHED YOU COULD GO TO SLEEP AND NOT WAKE UP?: NO
6. HAVE YOU EVER DONE ANYTHING, STARTED TO DO ANYTHING, OR PREPARED TO DO ANYTHING TO END YOUR LIFE?: NO

## 2024-08-06 ASSESSMENT — PAIN SCALES - GENERAL: PAINLEVEL_OUTOF10: 0 - NO PAIN

## 2024-08-06 NOTE — ED PROVIDER NOTES
HPI   Chief Complaint   Patient presents with    Coughing Up Blood     Pt. Has been ill with a cough and congestion x 1 week and today started to spit up blood.       89-year-old female presents emergency department, according to her and her daughter patient had a cough for about the last 3 weeks.  States she has had some congestion and postnasal drip, cough.  No significant shortness of breath, notes that she has not had to use her inhaler.  Patient states today she had some coughing, coughed up bright red blood in her sputum.  Patient is not on any blood thinners.    States she does have history of COPD      History provided by:  Patient   used: No            Patient History   Past Medical History:   Diagnosis Date    Acute sinusitis, unspecified 04/24/2019    Acute non-recurrent sinusitis, unspecified location    Encounter for general adult medical examination without abnormal findings 02/19/2020    Routine general medical examination at health care facility    Personal history of other endocrine, nutritional and metabolic disease     History of thyroid disorder     Past Surgical History:   Procedure Laterality Date    MR CHEST ANGIO W IV CONTRAST  12/5/2014    MR CHEST ANGIO W IV CONTRAST 12/5/2014 [facility] [place] ANCILLARY LEGACY    OTHER SURGICAL HISTORY  02/27/2019    Umbilical hernia repair     Family History   Problem Relation Name Age of Onset    Aneurysm Father      Colon cancer Daughter       Social History     Tobacco Use    Smoking status: Never    Smokeless tobacco: Never   Vaping Use    Vaping status: Never Used   Substance Use Topics    Alcohol use: Never    Drug use: Never       Physical Exam   ED Triage Vitals [08/06/24 1542]   Temperature Heart Rate Respirations BP   36.7 °C (98.1 °F) 63 20 171/81      Pulse Ox Temp Source Heart Rate Source Patient Position   97 % Temporal -- Sitting      BP Location FiO2 (%)     Right arm --       Physical Exam  Constitutional: Vitals noted, no distress.  Afebrile.   Cardiovascular: Regular, rate, rhythm, no murmur.   Pulmonary: Lungs clear bilaterally with good aeration. No adventitious breath sounds.   Gastrointestinal: Soft, nonsurgical. Nontender. No peritoneal signs. Normoactive bowel sounds.   Musculoskeletal: No peripheral edema. Negative Homans bilaterally, no cords.   Skin: No rash.   Neuro: No focal neurologic deficits, NIH score of 0.      ED Course & MDM   Diagnoses as of 08/06/24 2316   Acute bronchitis, unspecified organism   Hemoptysis   Lung nodule          Labs Reviewed   CBC WITH AUTO DIFFERENTIAL - Abnormal       Result Value    WBC 7.0      nRBC 0.0      RBC 3.85 (*)     Hemoglobin 11.6 (*)     Hematocrit 34.0 (*)     MCV 88      MCH 30.1      MCHC 34.1      RDW 12.4      Platelets 316      Neutrophils % 69.6      Immature Granulocytes %, Automated 0.3      Lymphocytes % 21.4      Monocytes % 6.4      Eosinophils % 1.6      Basophils % 0.7      Neutrophils Absolute 4.90      Immature Granulocytes Absolute, Automated 0.02      Lymphocytes Absolute 1.51      Monocytes Absolute 0.45      Eosinophils Absolute 0.11      Basophils Absolute 0.05     COMPREHENSIVE METABOLIC PANEL - Abnormal    Glucose 85      Sodium 135 (*)     Potassium 3.8      Chloride 98      Bicarbonate 30      Anion Gap 11      Urea Nitrogen 17      Creatinine 0.78      eGFR 73      Calcium 9.0      Albumin 4.1      Alkaline Phosphatase 74      Total Protein 7.1      AST 18      Bilirubin, Total 0.6      ALT 12     MAGNESIUM - Abnormal    Magnesium 1.21 (*)    PROTIME-INR - Normal    Protime 11.8      INR 1.0     TROPONIN I, HIGH SENSITIVITY - Normal    Troponin I, High Sensitivity 4      Narrative:     Less than 99th percentile of normal range cutoff-  Female and children under 18 years old <14 ng/L; Male <21 ng/L: Negative  Repeat testing should be performed if clinically indicated.     Female and children under 18 years old 14-50 ng/L; Male 21-50 ng/L:  Consistent with possible  cardiac damage and possible increased clinical   risk. Serial measurements may help to assess extent of myocardial damage.     >50 ng/L: Consistent with cardiac damage, increased clinical risk and  myocardial infarction. Serial measurements may help assess extent of   myocardial damage.      NOTE: Children less than 1 year old may have higher baseline troponin   levels and results should be interpreted in conjunction with the overall   clinical context.     NOTE: Troponin I testing is performed using a different   testing methodology at [facility] than at other   system hospitals. Direct result comparisons should only   be made within the same method.   B-TYPE NATRIURETIC PEPTIDE - Normal    BNP 83      Narrative:        <100 pg/mL - Heart failure unlikely  100-299 pg/mL - Intermediate probability of acute heart                  failure exacerbation. Correlate with clinical                  context and patient history.    >=300 pg/mL - Heart Failure likely. Correlate with clinical                  context and patient history.    BNP testing is performed using different testing methodology at [facility] than at other [facility] hospitals. Direct result comparisons should only be made within the same method.      POCT GLUCOSE - Normal    POCT Glucose 86          CT angio chest for pulmonary embolism   Final Result   1. No evidence of pulmonary emboli.   2. 1.3 x 0.5 cm spiculated nodule at the right upper lobe, neoplasm   cannot be excluded. PET/CT recommended for further evaluation.   3. 0.6 cm noncalcified nodule at the left upper lobe. This can be   follow-up with CT chest in 3 months to re-evaluate.   4. Mild emphysema.   5. Mild cardiomegaly. Coronary artery calcifications. Borderline   dilation of the main pulmonary artery, please correlate for pulmonary   arterial hypertension.             MACRO:   Critical Finding:  See findings. Notification was initiated on   8/6/2024 at 9:31 pm by   Nava Tinsley.  (**-YCF-**) Instructions:        Signed by: Nava Tinsley 8/6/2024 9:36 PM   Dictation workstation:   [id number]               No data recorded     Jameson Coma Scale Score: 15 (08/06/24 1541 : Ehsan Andres RN)                           Medical Decision Making  Workup initiated, concern for pneumonia versus bronchitis versus PE as the cause of the patient's hemoptysis.    CBC and metabolic panels unremarkable other than the patient does have hypomagnesemia, repleted IV piggyback.  Troponin 4, BNP 83.    CT imaging of the chest obtained, no evidence of PE.  Spiculated nodule noted right upper lobe as well as noncalcified nodule left upper lobe.  Emphysema is also noted.     EKG at 2243 with ventricular of 79, as interpreted by me, shows normal sinus rhythm with left axis deviation, right bundle branch block, unremarkable ST and T wave patterns, no evidence of acute ischemia or other acute findings.    Patient will be treated for bronchitis, placed on steroids and Z-Danny given her COPD history as well as instructed to frequently use breathing treatments.   Referred to pulmonology for close follow-up, patient's to closely monitor her hemoptysis, return with any worsening symptoms or any additional concerns.    Procedure  Procedures     Katty Castro, ISABELLE-PHILIP  08/06/24 3344

## 2024-08-07 ENCOUNTER — HOSPITAL ENCOUNTER (OUTPATIENT)
Dept: CARDIOLOGY | Facility: HOSPITAL | Age: 89
Discharge: HOME | End: 2024-08-07
Payer: MEDICARE

## 2024-08-07 PROCEDURE — 93005 ELECTROCARDIOGRAM TRACING: CPT

## 2024-08-09 LAB
ATRIAL RATE: 83 BPM
P AXIS: 74 DEGREES
P OFFSET: 170 MS
P ONSET: 143 MS
PR INTERVAL: 162 MS
Q ONSET: 224 MS
QRS COUNT: 14 BEATS
QRS DURATION: 138 MS
QT INTERVAL: 406 MS
QTC CALCULATION(BAZETT): 477 MS
QTC FREDERICIA: 452 MS
R AXIS: -72 DEGREES
T AXIS: 18 DEGREES
T OFFSET: 427 MS
VENTRICULAR RATE: 83 BPM

## 2024-08-19 NOTE — PROGRESS NOTES
Patient: Katelyn Weiss    [id number]  : 1935 -- AGE 89 y.o.    Provider: Angie DODD CNP     Location [facility] [place]   Service Date: 24            [facility]  New Visit Note      HISTORY OF PRESENT ILLNESS     The patient's referring provider is: Katty Castro APRN-C*    HISTORY OF PRESENT ILLNESS   Katelyn Weiss is a 89 y.o. female with a history of Diabetes type 2, hypertension, hyperlipidemia, acquired hypothyroidism, CKD stage III, CAD, chronic anemia, COPD and anxiety who is a former smoker (~35 pack years), who presents to a [facility] for an initial evaluation for lung nodules.     I have independently interviewed and examined the patient in the office and reviewed available records.    Current History    On today's visit, the patient reports having a recent ER visit 2024 for hemoptysis.  Was ill with a cough congestion for a week. Tx for bronchitis given steroids, Zpack and q4 breathing treatments.  Has not had any further hemoptysis.    Has occasional dry cough.  States she has COPD, only has an albuterol inhaler that she rarely uses.  She denies fevers, chills, wheezing, dyspnea on exertion and chest tightness.    Previous pulmonary history: COPD/ emphsema    Inhalers/nebulized medications: albuterol    Hospitalization History: She has not been hospitalized over the last year for breathing related problem.    Sleep history: Denies snoring, apnea, feeling tired during the day or taking naps during the day.        ALLERGIES AND MEDICATIONS     ALLERGIES  Allergies   Allergen Reactions    Bee Venom Protein (Honey Bee) Anaphylaxis    Losartan Potassium Unknown    Sulfa (Sulfonamide Antibiotics) Unknown    Sulfamethoxazole Unknown       MEDICATIONS  Current Outpatient Medications   Medication Sig Dispense Refill    albuterol 90 mcg/actuation inhaler INHALE 1 TO 2 PUFFS EVERY 4 TO 6 HOURS AS NEEDED. 18 g  5    ALPRAZolam (Xanax) 1 mg tablet Take 1 tablet (1 mg) by mouth 2 times a day as needed for anxiety (for 30 days). 60 tablet 1    aspirin 81 mg chewable tablet Chew.      atorvastatin (Lipitor) 20 mg tablet Take 1 tablet (20 mg) by mouth once daily. 90 tablet 2    azithromycin (Zithromax Z-Danny) 250 mg tablet Take 2 tablets today, then 1 tablet daily 6 tablet 0    bimatoprost (Lumigan) 0.03 % ophthalmic solution Administer into affected eye(s).      cyanocobalamin (Vitamin B-12) 1,000 mcg tablet Take 1 tablet (1,000 mcg) by mouth once daily. 30 tablet 3    cyanocobalamin (Vitamin B-12) 1,000 mcg/mL injection 1 mL (1,000 mcg) every 14 (fourteen) days. Inject      docusate sodium (Colace) 100 mg capsule Take 1 capsule (100 mg) by mouth 2 times a day.      fluticasone (Flonase) 50 mcg/actuation nasal spray Administer 2 sprays into each nostril once daily. SHAKE LIQUID      lancets 33 gauge misc       levothyroxine (Synthroid, Levoxyl) 50 mcg tablet Take 1 tablet (50 mcg) by mouth once daily in the morning. Take before meals. 90 tablet 1    meloxicam (Mobic) 15 mg tablet Take 1 tablet (15 mg) by mouth once daily. Take with food 30 tablet 0    metFORMIN  mg 24 hr tablet Take 1 tablet (500 mg) by mouth once daily in the evening. Take with meals. 90 tablet 1    metoprolol tartrate (Lopressor) 25 mg tablet Take 0.5 tablets (12.5 mg) by mouth. 2 times daily      NIFEdipine ER (Adalat CC) 30 mg 24 hr tablet Take 1 tablet (30 mg) by mouth once daily. 90 tablet 2    nitroglycerin (Nitrostat) 0.4 mg SL tablet Place 1 tablet (0.4 mg) under the tongue every 5 minutes if needed for chest pain. 25 tablet 1    OneTouch Ultra Test strip TEST 3 TIMES DAILY.TEST 3 TIMES DAILY. 300 strip 3    pantoprazole (ProtoNix) 40 mg EC tablet Take 1 tablet (40 mg) by mouth once daily. 90 tablet 1    tamsulosin (Flomax) 0.4 mg 24 hr capsule Take 1 capsule (0.4 mg) by mouth once daily.      timolol (Timoptic) 0.5 % ophthalmic solution  Administer 1 drop into both eyes once daily.       No current facility-administered medications for this visit.         PAST HISTORY     PAST MEDICAL HISTORY  COPD  Emphysema  Type 2 diabetes  Hypertension  Hyperlipidemia  Acquired hypothyroidism  Anxiety  CKD stage III  Chronic anemia    PAST SURGICAL HISTORY  Past Surgical History:   Procedure Laterality Date    MR CHEST ANGIO W IV CONTRAST  12/5/2014    MR CHEST ANGIO W IV CONTRAST 12/5/2014 [facility] [place] ANCILLARY LEGACY    OTHER SURGICAL HISTORY  02/27/2019    Umbilical hernia repair       IMMUNIZATION HISTORY  Immunization History   Administered Date(s) Administered    Influenza, seasonal, injectable 09/09/2021    Pfizer Purple Cap SARS-CoV-2 02/09/2021, 03/13/2021, 10/28/2021    Pneumococcal conjugate vaccine, 13-valent (PREVNAR 13) 10/07/2015    Pneumococcal polysaccharide vaccine, 23-valent, age 2 years and older (PNEUMOVAX 23) 09/18/2007, 02/07/2014       SOCIAL HISTORY  Tobacco Smoking: late 20 - 59 y/o- 1/2- 1 ppd - ~35 pack years  Illicit drugs: none  Alcohol consumption: none   Pets: none    OCCUPATIONAL/ENVIRONMENTAL HISTORY  Occupation: . 30+ years  No known exposure to asbestos, silica, beryllium or inhaled metals.  No exposure to birds or exotic animals.    FAMILY HISTORY  Family History   Problem Relation Name Age of Onset    Aneurysm Father      Colon cancer Daughter       Mother - Lung cancer   No family history of autoimmune disorders.    REVIEW OF SYSTEMS     REVIEW OF SYSTEMS  Review of Systems    Constitutional: No fever, no chills, no night sweats.    Eyes: No double vision, no floaters, no dry eyes.   ENT: See HPI.   Neck: No neck stiffness.  Cardiovascular: No sharp chest pain, no heart racing, no leg swelling.  Respiratory: as noted in HPI.   Gastrointestinal: No nausea, no vomiting, no diarrhea.   Musculoskeletal: No joint pain, no back pain.   Integumentary: No rashes or sores.  Neurological: No dizziness, no headaches. Sleeping  well.  Psychiatric: No mood changes.   Endocrine: No hot flashes, no cold intolerance, weight is stable.  Hematologic: No easy bruising or bleeding.    PHYSICAL EXAM     VITAL SIGNS:   Vitals:    09/04/24 1513   BP: 159/76   Pulse: 76   Resp: 18   Temp: 36.6 °C (97.9 °F)   SpO2: 97%          CURRENT WEIGHT: Body mass index is 21.63 kg/m².    PREVIOUS WEIGHTS:  Wt Readings from Last 3 Encounters:   08/06/24 56.7 kg (125 lb)   06/03/24 59.9 kg (132 lb)   04/24/24 59.9 kg (132 lb)       Physical Exam    Constitutional: General appearance: Alert and oriented.  No acute distress. Well developed, well nourished.  Head and face: Symmetric  ENT: external inspection of ear and nose normal. No intranasal polyps. No oropharyngeal exudates.    Oropharynx: normal   Neck: supple, no lymphadenopathy  Pulmonary: Chest is normal. No increased work of breathing or signs of respiratory distress. Clear to auscultation bilaterally - no crackles, wheezing, or rhonchi.   Cardiovascular: Heart rate and rhythm normal. Normal S1, S2 - no murmurs, gallops, or pericardial rub.   Abdomen: Soft, non tender, +BS  Extremities: No edema. No clubbing or cyanosis of the fingernails.    Neurologic: Moves all four extremities   MSK: Normal movements of extremities. Gait normal   Psychiatric: Intact judgement and insight.    RESULTS/DATA     Pulmonary Function Test Results     No testing done.    Chest Radiograph     XR chest 1 view 06/22/2023    Narrative  Interpreted By:  MARY ALCAZAR MD  STUDY:  Chest Radiograph;  6/22/2023 1:53 PM.    INDICATION:  Fall.    COMPARISON:  Chest radiographs 3/22/2023.    ACCESSION NUMBER(S):  [id number]    ORDERING CLINICIAN:  FATOU CASAS MD    TECHNIQUE:  Frontal chest was obtained at 13:48 hours.    FINDINGS:    CARDIOMEDIASTINAL SILHOUETTE:  Cardiomediastinal silhouette is normal in size and configuration.    LUNGS:  Lungs are mildly hyperinflated without focal infiltrates, edema  or  effusion..    ABDOMEN:  No remarkable upper abdominal findings.    BONES:  No acute osseous changes.    Impression  Mild hyperinflation without focal infiltrates, edema or effusion.      Signed by Vincenzo Yadav MD      Chest CT Scan     CT angio chest for pulmonary embolism 08/06/2024    Narrative  Interpreted By:  Nava Tinsley,  STUDY:  CT ANGIO CHEST FOR PULMONARY EMBOLISM;  8/6/2024 8:46 pm    INDICATION:  Signs/Symptoms:Cough, hemoptysis    COMPARISON:  CT abdomen and pelvis 01/11/2015    ACCESSION NUMBER(S):  [id number]    ORDERING CLINICIAN:  RAMA PASCAL    TECHNIQUE:  Helical data acquisition of the chest was obtained following the  uneventful administration of intravenous contrast material. Images  were reformatted in axial, coronal, and sagittal planes. MIP images  were created and reviewed.    FINDINGS:  POTENTIAL LIMITATIONS OF THE STUDY: Motion artifact.    HEART AND VESSELS:  No discrete filling defects within the main pulmonary artery or its  branches.    No thoracic aortic aneurysm. Mild atherosclerotic calcifications at  the thoracic aorta. There is borderline dilation of the main  pulmonary artery. The heart is mildly enlarged.  Coronary artery  calcifications are noted. No evidence of pericardial effusion.    MEDIASTINUM AND KANCHAN, LOWER NECK AND AXILLA:  The visualized thyroid gland is small size.    No evidence of thoracic lymphadenopathy by CT criteria.    LUNGS AND AIRWAYS:  The trachea and central airways are patent.    The evaluation of the lungs is degraded by motion artifact. There is  biapical pleural scarring. There are mild bilateral emphysematous  changes. No consolidation, pleural effusion or pneumothorax.  Mild  dependent atelectasis at the bilateral lower lobes. There is a 1.3 x  0.5 cm spiculated nodule at the right upper lobe (series 404, axial  image 102). There is a 0.6 cm noncalcified nodule at the left upper  lobe (series 404, axial image 118).    UPPER  "ABDOMEN:  There is a 3.5 cm cortical exophytic at the right kidney.  Subcentimeter faint hyperdensity at the superior pole of the left  kidney is too small to be adequately characterized.    CHEST WALL AND OSSEOUS STRUCTURES:  There diffuse osteopenia.  Multilevel degenerative changes at the spine. Osseous hemangiomas are  noted the thoracic spine.    Impression  1. No evidence of pulmonary emboli.  2. 1.3 x 0.5 cm spiculated nodule at the right upper lobe, neoplasm  cannot be excluded. PET/CT recommended for further evaluation.  3. 0.6 cm noncalcified nodule at the left upper lobe. This can be  follow-up with CT chest in 3 months to re-evaluate.  4. Mild emphysema.  5. Mild cardiomegaly. Coronary artery calcifications. Borderline  dilation of the main pulmonary artery, please correlate for pulmonary  arterial hypertension.      MACRO:  Critical Finding:  See findings. Notification was initiated on  8/6/2024 at 9:31 pm by  Nava Tinsley.  (**-YCF-**) Instructions:    Signed by: Nava Tinsley 8/6/2024 9:36 PM  Dictation workstation:   [id number]      Echocardiogram     No testing done.       Labwork   Complete Blood Count  Lab Results   Component Value Date    WBC 7.0 08/06/2024    HGB 11.6 (L) 08/06/2024    HCT 34.0 (L) 08/06/2024    MCV 88 08/06/2024     08/06/2024       Peripheral Eosinophil Count/Percentage:   Eosinophils Absolute (x10*3/uL)   Date Value   08/06/2024 0.11     Eosinophils % (%)   Date Value   08/06/2024 1.6       Serum Immunoglobulin E:    No results found for: \"IGE\"     ASSESSMENT/PLAN   Ms. Weiss is a 89 y.o. female, with a history of Diabetes type 2, hypertension, hyperlipidemia, acquired hypothyroidism, CKD stage III, CAD, chronic anemia, COPD and anxiety who is a former smoker (~35 pack years), who presents to a [facility] for an initial evaluation for lung nodules.     Problem List and Orders  Problem List Items Addressed This Visit  "   None  Visit Diagnoses       Lung nodule    -  Primary    Relevant Orders    NM PET CT lung CA initial diagnosis    Malignant neoplasm of upper lobe, right bronchus or lung (Multi)        Relevant Orders    NM PET CT lung CA initial diagnosis            Assessment and Plan / Recommendations:  Problem List Items Addressed This Visit    None       Lung nodule; 1.3mm x 5 mm spiculated nodule RUL  - Will get PET/CT now  - Will reach out to IP for bronchoscopy- after PET/CT    Emphysema on CT   - continue albuterol HFA or albuterol nebulizers every 4-6 hours as needed for shortness of breath    Follow up in 3 weeks (after PET/CT) or sooner if needed - can be telephone    If you have any questions please call the office [phone]    Thank you for visiting the Pulmonary clinic today!   Angie Robert CNP  [phone]

## 2024-09-04 ENCOUNTER — LAB (OUTPATIENT)
Dept: LAB | Facility: LAB | Age: 89
End: 2024-09-04
Payer: MEDICARE

## 2024-09-04 ENCOUNTER — APPOINTMENT (OUTPATIENT)
Dept: PRIMARY CARE | Facility: CLINIC | Age: 89
End: 2024-09-04
Payer: MEDICARE

## 2024-09-04 ENCOUNTER — OFFICE VISIT (OUTPATIENT)
Dept: PULMONOLOGY | Facility: CLINIC | Age: 89
End: 2024-09-04
Payer: MEDICARE

## 2024-09-04 VITALS
BODY MASS INDEX: 20.83 KG/M2 | RESPIRATION RATE: 16 BRPM | HEART RATE: 63 BPM | TEMPERATURE: 97.4 F | HEIGHT: 65 IN | WEIGHT: 125 LBS | DIASTOLIC BLOOD PRESSURE: 64 MMHG | OXYGEN SATURATION: 98 % | SYSTOLIC BLOOD PRESSURE: 124 MMHG

## 2024-09-04 VITALS
HEART RATE: 76 BPM | SYSTOLIC BLOOD PRESSURE: 159 MMHG | OXYGEN SATURATION: 97 % | RESPIRATION RATE: 18 BRPM | WEIGHT: 130 LBS | BODY MASS INDEX: 21.66 KG/M2 | HEIGHT: 65 IN | DIASTOLIC BLOOD PRESSURE: 76 MMHG | TEMPERATURE: 97.9 F

## 2024-09-04 DIAGNOSIS — C34.11 MALIGNANT NEOPLASM OF UPPER LOBE, RIGHT BRONCHUS OR LUNG (MULTI): ICD-10-CM

## 2024-09-04 DIAGNOSIS — R91.1 LUNG NODULE: Primary | ICD-10-CM

## 2024-09-04 DIAGNOSIS — I10 ESSENTIAL HYPERTENSION: ICD-10-CM

## 2024-09-04 DIAGNOSIS — Z79.899 MEDICATION MANAGEMENT: ICD-10-CM

## 2024-09-04 DIAGNOSIS — E78.2 MIXED HYPERLIPIDEMIA: ICD-10-CM

## 2024-09-04 DIAGNOSIS — E03.9 ACQUIRED HYPOTHYROIDISM: ICD-10-CM

## 2024-09-04 DIAGNOSIS — F41.1 GENERALIZED ANXIETY DISORDER: ICD-10-CM

## 2024-09-04 DIAGNOSIS — K21.9 GASTROESOPHAGEAL REFLUX DISEASE WITHOUT ESOPHAGITIS: ICD-10-CM

## 2024-09-04 DIAGNOSIS — E11.22 TYPE 2 DIABETES MELLITUS WITH STAGE 3A CHRONIC KIDNEY DISEASE, WITHOUT LONG-TERM CURRENT USE OF INSULIN (MULTI): Primary | ICD-10-CM

## 2024-09-04 DIAGNOSIS — E11.22 TYPE 2 DIABETES MELLITUS WITH STAGE 3A CHRONIC KIDNEY DISEASE, WITHOUT LONG-TERM CURRENT USE OF INSULIN (MULTI): ICD-10-CM

## 2024-09-04 DIAGNOSIS — D64.9 CHRONIC ANEMIA: ICD-10-CM

## 2024-09-04 DIAGNOSIS — N18.31 TYPE 2 DIABETES MELLITUS WITH STAGE 3A CHRONIC KIDNEY DISEASE, WITHOUT LONG-TERM CURRENT USE OF INSULIN (MULTI): ICD-10-CM

## 2024-09-04 DIAGNOSIS — N18.31 TYPE 2 DIABETES MELLITUS WITH STAGE 3A CHRONIC KIDNEY DISEASE, WITHOUT LONG-TERM CURRENT USE OF INSULIN (MULTI): Primary | ICD-10-CM

## 2024-09-04 LAB
ALBUMIN SERPL BCP-MCNC: 4.4 G/DL (ref 3.4–5)
ALP SERPL-CCNC: 80 U/L (ref 33–136)
ALT SERPL W P-5'-P-CCNC: 9 U/L (ref 7–45)
ANION GAP SERPL CALC-SCNC: 11 MMOL/L (ref 10–20)
AST SERPL W P-5'-P-CCNC: 18 U/L (ref 9–39)
BASOPHILS # BLD AUTO: 0.07 X10*3/UL (ref 0–0.1)
BASOPHILS NFR BLD AUTO: 1.4 %
BILIRUB SERPL-MCNC: 0.8 MG/DL (ref 0–1.2)
BUN SERPL-MCNC: 12 MG/DL (ref 6–23)
CALCIUM SERPL-MCNC: 9.4 MG/DL (ref 8.6–10.3)
CHLORIDE SERPL-SCNC: 97 MMOL/L (ref 98–107)
CHOLEST SERPL-MCNC: 144 MG/DL (ref 0–199)
CHOLESTEROL/HDL RATIO: 2.9
CO2 SERPL-SCNC: 31 MMOL/L (ref 21–32)
CREAT SERPL-MCNC: 0.88 MG/DL (ref 0.5–1.05)
EGFRCR SERPLBLD CKD-EPI 2021: 63 ML/MIN/1.73M*2
EOSINOPHIL # BLD AUTO: 0.21 X10*3/UL (ref 0–0.4)
EOSINOPHIL NFR BLD AUTO: 4.1 %
ERYTHROCYTE [DISTWIDTH] IN BLOOD BY AUTOMATED COUNT: 12.3 % (ref 11.5–14.5)
EST. AVERAGE GLUCOSE BLD GHB EST-MCNC: 134 MG/DL
GLUCOSE SERPL-MCNC: 126 MG/DL (ref 74–99)
HBA1C MFR BLD: 6.3 %
HCT VFR BLD AUTO: 37.8 % (ref 36–46)
HDLC SERPL-MCNC: 49.7 MG/DL
HGB BLD-MCNC: 12.2 G/DL (ref 12–16)
IMM GRANULOCYTES # BLD AUTO: 0.01 X10*3/UL (ref 0–0.5)
IMM GRANULOCYTES NFR BLD AUTO: 0.2 % (ref 0–0.9)
LDLC SERPL CALC-MCNC: 70 MG/DL
LYMPHOCYTES # BLD AUTO: 1.37 X10*3/UL (ref 0.8–3)
LYMPHOCYTES NFR BLD AUTO: 26.6 %
MCH RBC QN AUTO: 29.8 PG (ref 26–34)
MCHC RBC AUTO-ENTMCNC: 32.3 G/DL (ref 32–36)
MCV RBC AUTO: 92 FL (ref 80–100)
MONOCYTES # BLD AUTO: 0.43 X10*3/UL (ref 0.05–0.8)
MONOCYTES NFR BLD AUTO: 8.3 %
NEUTROPHILS # BLD AUTO: 3.06 X10*3/UL (ref 1.6–5.5)
NEUTROPHILS NFR BLD AUTO: 59.4 %
NON HDL CHOLESTEROL: 94 MG/DL (ref 0–149)
NRBC BLD-RTO: 0 /100 WBCS (ref 0–0)
PLATELET # BLD AUTO: 441 X10*3/UL (ref 150–450)
POC AMPHETAMINE: NEGATIVE NG/ML
POC BARBITURATES: NEGATIVE NG/ML
POC BENZODIAZEPINES: POSITIVE NG/ML
POC BUPRENORPHINE URINE: NEGATIVE NG/ML
POC COCAINE: NEGATIVE NG/ML
POC MDMA (NG/ML) IN URINE: NEGATIVE NG/ML
POC METHADONE MANUALLY ENTERED: NEGATIVE NG/ML
POC METHAMPHETAMINE: NEGATIVE NG/ML
POC MORPHINE URINE: NEGATIVE NG/ML
POC OPIATES: NEGATIVE NG/ML
POC OXYCODONE: NEGATIVE NG/ML
POC PHENCYCLIDINE (PCP): NEGATIVE NG/ML
POC THC: NEGATIVE NG/ML
POC TICYCLIC ANTIDEPRESSANTS (TCA): NEGATIVE NG/ML
POTASSIUM SERPL-SCNC: 4.4 MMOL/L (ref 3.5–5.3)
PROT SERPL-MCNC: 7.1 G/DL (ref 6.4–8.2)
RBC # BLD AUTO: 4.09 X10*6/UL (ref 4–5.2)
SODIUM SERPL-SCNC: 135 MMOL/L (ref 136–145)
T4 FREE SERPL-MCNC: 1.65 NG/DL (ref 0.61–1.12)
TRIGL SERPL-MCNC: 120 MG/DL (ref 0–149)
TSH SERPL-ACNC: 2.45 MIU/L (ref 0.44–3.98)
VLDL: 24 MG/DL (ref 0–40)
WBC # BLD AUTO: 5.2 X10*3/UL (ref 4.4–11.3)

## 2024-09-04 PROCEDURE — 1036F TOBACCO NON-USER: CPT | Performed by: FAMILY MEDICINE

## 2024-09-04 PROCEDURE — 80305 DRUG TEST PRSMV DIR OPT OBS: CPT | Performed by: FAMILY MEDICINE

## 2024-09-04 PROCEDURE — 85025 COMPLETE CBC W/AUTO DIFF WBC: CPT

## 2024-09-04 PROCEDURE — 3077F SYST BP >= 140 MM HG: CPT

## 2024-09-04 PROCEDURE — 99204 OFFICE O/P NEW MOD 45 MIN: CPT

## 2024-09-04 PROCEDURE — 3074F SYST BP LT 130 MM HG: CPT | Performed by: FAMILY MEDICINE

## 2024-09-04 PROCEDURE — 1036F TOBACCO NON-USER: CPT

## 2024-09-04 PROCEDURE — 80053 COMPREHEN METABOLIC PANEL: CPT

## 2024-09-04 PROCEDURE — 99214 OFFICE O/P EST MOD 30 MIN: CPT | Performed by: FAMILY MEDICINE

## 2024-09-04 PROCEDURE — 1160F RVW MEDS BY RX/DR IN RCRD: CPT | Performed by: FAMILY MEDICINE

## 2024-09-04 PROCEDURE — 84439 ASSAY OF FREE THYROXINE: CPT

## 2024-09-04 PROCEDURE — 84443 ASSAY THYROID STIM HORMONE: CPT

## 2024-09-04 PROCEDURE — 83036 HEMOGLOBIN GLYCOSYLATED A1C: CPT

## 2024-09-04 PROCEDURE — 1159F MED LIST DOCD IN RCRD: CPT

## 2024-09-04 PROCEDURE — 1123F ACP DISCUSS/DSCN MKR DOCD: CPT | Performed by: FAMILY MEDICINE

## 2024-09-04 PROCEDURE — 1159F MED LIST DOCD IN RCRD: CPT | Performed by: FAMILY MEDICINE

## 2024-09-04 PROCEDURE — 1158F ADVNC CARE PLAN TLK DOCD: CPT | Performed by: FAMILY MEDICINE

## 2024-09-04 PROCEDURE — 3078F DIAST BP <80 MM HG: CPT

## 2024-09-04 PROCEDURE — 1123F ACP DISCUSS/DSCN MKR DOCD: CPT

## 2024-09-04 PROCEDURE — 80061 LIPID PANEL: CPT

## 2024-09-04 PROCEDURE — 3078F DIAST BP <80 MM HG: CPT | Performed by: FAMILY MEDICINE

## 2024-09-04 PROCEDURE — 36415 COLL VENOUS BLD VENIPUNCTURE: CPT

## 2024-09-04 RX ORDER — LATANOPROST 50 UG/ML
SOLUTION/ DROPS OPHTHALMIC
COMMUNITY
Start: 2024-08-13

## 2024-09-04 RX ORDER — LEVOTHYROXINE SODIUM 50 UG/1
50 TABLET ORAL
Qty: 90 TABLET | Refills: 1 | Status: SHIPPED | OUTPATIENT
Start: 2024-09-04

## 2024-09-04 RX ORDER — PANTOPRAZOLE SODIUM 40 MG/1
40 TABLET, DELAYED RELEASE ORAL DAILY
Qty: 90 TABLET | Refills: 1 | Status: SHIPPED | OUTPATIENT
Start: 2024-09-04

## 2024-09-04 RX ORDER — METFORMIN HYDROCHLORIDE 500 MG/1
500 TABLET, EXTENDED RELEASE ORAL
Qty: 90 TABLET | Refills: 1 | Status: SHIPPED | OUTPATIENT
Start: 2024-09-04

## 2024-09-04 RX ORDER — ALPRAZOLAM 1 MG/1
1 TABLET ORAL 2 TIMES DAILY PRN
Qty: 60 TABLET | Refills: 1 | Status: SHIPPED | OUTPATIENT
Start: 2024-09-04

## 2024-09-04 ASSESSMENT — ANXIETY QUESTIONNAIRES
IF YOU CHECKED OFF ANY PROBLEMS ON THIS QUESTIONNAIRE, HOW DIFFICULT HAVE THESE PROBLEMS MADE IT FOR YOU TO DO YOUR WORK, TAKE CARE OF THINGS AT HOME, OR GET ALONG WITH OTHER PEOPLE: NOT DIFFICULT AT ALL
3. WORRYING TOO MUCH ABOUT DIFFERENT THINGS: NOT AT ALL
2. NOT BEING ABLE TO STOP OR CONTROL WORRYING: NOT AT ALL
6. BECOMING EASILY ANNOYED OR IRRITABLE: NOT AT ALL
4. TROUBLE RELAXING: NOT AT ALL
1. FEELING NERVOUS, ANXIOUS, OR ON EDGE: SEVERAL DAYS
7. FEELING AFRAID AS IF SOMETHING AWFUL MIGHT HAPPEN: NOT AT ALL
GAD7 TOTAL SCORE: 1
5. BEING SO RESTLESS THAT IT IS HARD TO SIT STILL: NOT AT ALL

## 2024-09-04 ASSESSMENT — ENCOUNTER SYMPTOMS
HEADACHES: 0
SHORTNESS OF BREATH: 0
CHILLS: 0
WEAKNESS: 0
POLYPHAGIA: 0
WEIGHT LOSS: 0
RHINORRHEA: 0
NUMBNESS: 0
PANIC: 0
ABDOMINAL PAIN: 0
FEVER: 0
DIZZINESS: 0
DYSURIA: 0
VISUAL CHANGE: 0
BLURRED VISION: 0
PALPITATIONS: 0
FATIGUE: 0
POLYDIPSIA: 0
RESTLESSNESS: 0
NERVOUS/ANXIOUS: 1
WHEEZING: 0
SORE THROAT: 0
HEMATURIA: 0
COUGH: 0
CONSTIPATION: 0
FREQUENCY: 0
DIARRHEA: 0
VOMITING: 0
DEPRESSED MOOD: 0
TREMORS: 0

## 2024-09-04 ASSESSMENT — PATIENT HEALTH QUESTIONNAIRE - PHQ9
1. LITTLE INTEREST OR PLEASURE IN DOING THINGS: NOT AT ALL
SUM OF ALL RESPONSES TO PHQ9 QUESTIONS 1 AND 2: 1
10. IF YOU CHECKED OFF ANY PROBLEMS, HOW DIFFICULT HAVE THESE PROBLEMS MADE IT FOR YOU TO DO YOUR WORK, TAKE CARE OF THINGS AT HOME, OR GET ALONG WITH OTHER PEOPLE: NOT DIFFICULT AT ALL
2. FEELING DOWN, DEPRESSED OR HOPELESS: SEVERAL DAYS

## 2024-09-04 NOTE — ASSESSMENT & PLAN NOTE
"Well-controlled, continue current medications and management.  Dietary sodium restriction.  Regular aerobic exercise program is recommended to help achieve and maintain normal body weight, fitness and improve lipid balance. .  Dietary changes: Increase soluble fiber  Plant sterols 2grams per day (e.g. Benecol)  Reduce saturated fat, \"trans\" monounsaturated fatty acids, and cholesterol  " [email or web address]  URL:  [email or web address]  

## 2024-09-04 NOTE — PROGRESS NOTES
Subjective   Patient ID: Katelyn Weiss is a 89 y.o. female who presents for Follow-up (Diabetes, Hypertension, Hyperlipidemia, Hypothyroidism and Anxiety).    Patient is here for follow-up on hypertension and hyperlipidemia. She has no chest pain, dyspnea, exertional chest pressure/discomfort, near-syncope, orthopnea, palpitations, paroxysmal nocturnal dyspnea, and syncope. Taking her medication regularly with no side effects.    She presents for follow up of hypothyroidism. Current symptoms: none. She has no change in energy level, diarrhea, heat / cold intolerance, palpitations, or weight changes.     Diabetes  She presents for her follow-up diabetic visit. She has type 2 diabetes mellitus. Hypoglycemia symptoms include nervousness/anxiousness. Pertinent negatives for hypoglycemia include no dizziness, headaches or tremors. Pertinent negatives for diabetes include no blurred vision, no chest pain, no fatigue, no foot paresthesias, no foot ulcerations, no polydipsia, no polyphagia, no polyuria, no visual change, no weakness and no weight loss. Risk factors for coronary artery disease include hypertension, diabetes mellitus and dyslipidemia.   Anxiety  Presents for follow-up visit. Symptoms include nervous/anxious behavior. Patient reports no chest pain, depressed mood, dizziness, palpitations, panic, restlessness, shortness of breath or suicidal ideas. Symptoms occur occasionally. The severity of symptoms is mild. The patient sleeps 5 hours per night. The quality of sleep is poor. Nighttime awakenings: one to two.     Compliance with medications is %.      Review of Systems   Constitutional:  Negative for chills, fatigue, fever and weight loss.   HENT:  Negative for congestion, ear pain, nosebleeds, rhinorrhea and sore throat.    Eyes:  Negative for blurred vision.   Respiratory:  Negative for cough, shortness of breath and wheezing.    Cardiovascular:  Negative for chest pain, palpitations and leg swelling.  "  Gastrointestinal:  Negative for abdominal pain, constipation, diarrhea and vomiting.   Endocrine: Negative for polydipsia, polyphagia and polyuria.   Genitourinary:  Negative for dysuria, frequency and hematuria.   Neurological:  Negative for dizziness, tremors, weakness, numbness and headaches.   Psychiatric/Behavioral:  Negative for suicidal ideas. The patient is nervous/anxious.        Objective   /64   Pulse 63   Temp 36.3 °C (97.4 °F)   Resp 16   Ht 1.651 m (5' 5\")   Wt 56.7 kg (125 lb)   SpO2 98%   BMI 20.80 kg/m²     Physical Exam  Constitutional:       General: She is not in acute distress.     Appearance: Normal appearance.   HENT:      Head: Normocephalic and atraumatic.      Mouth/Throat:      Mouth: Mucous membranes are moist.      Pharynx: Oropharynx is clear. No oropharyngeal exudate or posterior oropharyngeal erythema.   Eyes:      General: No scleral icterus.     Extraocular Movements: Extraocular movements intact.      Pupils: Pupils are equal, round, and reactive to light.   Cardiovascular:      Rate and Rhythm: Normal rate and regular rhythm.      Pulses: Normal pulses.      Heart sounds: S1 normal and S2 normal. Murmur heard.      Systolic murmur is present with a grade of 2/6.      No friction rub. No gallop. No S3 or S4 sounds.   Pulmonary:      Effort: Pulmonary effort is normal.      Breath sounds: No wheezing, rhonchi or rales.   Musculoskeletal:      Right lower leg: No edema.      Left lower leg: No edema.   Skin:     General: Skin is warm.      Coloration: Skin is not jaundiced or pale.      Findings: No erythema or rash.   Neurological:      General: No focal deficit present.      Mental Status: She is alert and oriented to person, place, and time.      Cranial Nerves: No cranial nerve deficit.      Sensory: No sensory deficit.      Coordination: Coordination normal.      Gait: Gait normal.         Assessment/Plan   Problem List Items Addressed This Visit       Acquired " "hypothyroidism     Stable, continue current medications and management.         Relevant Medications    levothyroxine (Synthroid, Levoxyl) 50 mcg tablet    Other Relevant Orders    Follow Up In [facility] - PCP - Established    Chronic anemia     Stable, continue current medications and management.         Essential hypertension     Well-controlled, continue current medications and management.  Dietary sodium restriction.  Regular aerobic exercise program is recommended to help achieve and maintain normal body weight, fitness and improve lipid balance. .  Dietary changes: Increase soluble fiber  Plant sterols 2grams per day (e.g. Benecol)  Reduce saturated fat, \"trans\" monounsaturated fatty acids, and cholesterol         Relevant Orders    Follow Up In [facility] - PCP - Established    Generalized anxiety disorder     Stable, continue current medications and management.  The risks and benefits of my recommendations, as well as other treatment options were discussed with the patient today.    The side effects of the medications were discussed.  Advised not to take the medication with alcohol .  Exercise regularly and this can give you a sense of well being and help decrease feelings of anxiety.;  Get plenty of sleep. Sleep rests your brain as well as your body, and can improve your general sense of wellbeing as well as your mood.;  Avoid alcohol and drug abuse. It may seem that alcohol or drugs relax you. But in the long run they make anxiety worse and cause more problems.;  Avoid caffeine. Caffeine is found in coffee, tea, soft drinks and chocolate. Caffeine may increase your sense of anxiety because it stimulates your nervous system. Also avoid over-the-counter diet pills, and cough and cold medicines that contain a decongestant.;  Confront the things that have made you anxious in the past. Begin by just picturing yourself confronting these things. By doing this, you can get used to the idea " of confronting the things that make you anxious before you actually do it. After you feel more comfortable picturing yourself confronting these things, you can begin to actually face them.;  If you feel yourself getting anxious, practice a relaxation technique or focus on a simple task, such as counting backward from 100 to 0.;  Although feelings of anxiety are scary, they won't hurt you. Label the level of your fear from 0 to 10 and keep track as it goes up and down. Notice that it doesn't stay at a very high level for more than a few seconds. When the fear comes, accept it. Wait and give it time to pass without running away from it.;  Take medications as prescribed.         Relevant Medications    ALPRAZolam (Xanax) 1 mg tablet    Other Relevant Orders    POCT waived urine drug screen manually resulted (Completed)    Follow Up In [facility] - PCP - Established    GERD (gastroesophageal reflux disease)     Well-controlled, continue current medications and management.         Relevant Medications    pantoprazole (ProtoNix) 40 mg EC tablet    Mixed hyperlipidemia     The nature of cardiac risk has been fully discussed with this patient. Discussed cardiovascular risk analysis and appropriate diet with the need for lifelong measures to reduce the risk. A regular exercise program is recommended to help achieve and maintain normal body weight, fitness and improve lipid balance. Patient education provided. They understand and agree with this course of treatment. They will return with new or worsening symptoms. Patient instructed to remain current with appropriate annual health maintenance.          Relevant Orders    Follow Up In [facility] - PCP - Established    Type 2 diabetes mellitus with stage 3a chronic kidney disease, without long-term current use of insulin (Multi) - Primary     Diabetes Mellitus type II, under good control.  1. Rx changes: None  2. Education: Reviewed ‘ABCs’ of diabetes  management (respective goals in parentheses):  A1C (<7), blood pressure (<130/80), and cholesterol (LDL <100).  3. Compliance at present is estimated to be good. Efforts to improve compliance (if necessary) will be directed at dietary modifications: and increased exercise.  4. Follow up: 3 months         Relevant Medications    metFORMIN  mg 24 hr tablet    Other Relevant Orders    Follow Up [facility] - PCP - Established     Other Visit Diagnoses       Medication management        Relevant Orders    POCT waived urine drug screen manually resulted (Completed)          [facility]:  Darius Quezada MD on 9/4/2024 11:49 AM  I have personally reviewed the [facility] report for Katelyn Weiss. I have considered the risks of abuse, dependence, addiction and diversion    Is the patient prescribed a combination of a benzodiazepine and opioid?  No    Last Urine Drug Screen / ordered today: Yes  Recent Results (from the past 8760 hour(s))   POCT waived urine drug screen manually resulted    Collection Time: 09/04/24 11:45 AM   Result Value Ref Range    POC THC Negative Negative, Not applicable ng/mL    POC Cocaine Negative Negative, Not applicable ng/mL    POC Opiates Negative Negative, Not applicable ng/mL    POC Amphetamine Negative Negative, Not applicable ng/mL    POC Phencyclidine (PCP) Negative Negative, Not applicable ng/mL    POC Barbiturates Negative Negative, Not applicable ng/mL    POC Benzodiazepines Positive (A) Negative, Not applicable ng/mL    POC Methamphetamine Negative Negative, Not applicable ng/mL    POC METHADONE MANUALLY ENTERED Negative Negative, Not applicable ng/mL    POC Ticyclic Antidepressants (TCA) Negative Negative, Not applicable ng/mL    POC Oxycodone Negative Negative, Not applicable ng/mL    POC MDMA URINE Negative Negative, Not applicable ng/mL    POC Morphine Urine Negative Negative, Not applicable ng/mL    POC Burprenorphine Urine Negative Negative, Not applicable ng/mL    OPIATE/OPIOID/BENZO PRESCRIPTION COMPLIANCE    Collection Time: 09/11/23 12:21 PM   Result Value Ref Range    DRUG SCREEN COMMENT URINE SEE BELOW     Creatine, Urine 129.0 mg/dL    Amphetamine Screen, Urine PRESUMPTIVE NEGATIVE NEGATIVE    Barbiturate Screen, Urine PRESUMPTIVE NEGATIVE NEGATIVE    Cannabinoid Screen, Urine PRESUMPTIVE NEGATIVE NEGATIVE    Cocaine Screen, Urine PRESUMPTIVE NEGATIVE NEGATIVE    PCP Screen, Urine PRESUMPTIVE NEGATIVE NEGATIVE    7-Aminoclonazepam <25 Cutoff <25 ng/mL    Alpha-Hydroxyalprazolam 805 (A) Cutoff <25 ng/mL    Alpha-Hydroxymidazolam <25 Cutoff <25 ng/mL    Alprazolam 269 (A) Cutoff <25 ng/mL    Chlordiazepoxide <25 Cutoff <25 ng/mL    Clonazepam <25 Cutoff <25 ng/mL    Diazepam <25 Cutoff <25 ng/mL    Lorazepam <25 Cutoff <25 ng/mL    Midazolam <25 Cutoff <25 ng/mL    Nordiazepam <25 Cutoff <25 ng/mL    Oxazepam <25 Cutoff <25 ng/mL    Temazepam <25 Cutoff <25 ng/mL    Zolpidem <25 Cutoff <25 ng/mL    Zolpidem Metabolite (ZCA) <25 Cutoff <25 ng/mL    6-Acetylmorphine <25 Cutoff <25 ng/mL    Codeine <50 Cutoff <50 ng/mL    Hydrocodone <25 Cutoff <25 ng/mL    Hydromorphone <25 Cutoff <25 ng/mL    Morphine Urine <50 Cutoff <50 ng/mL    Norhydrocodone <25 Cutoff <25 ng/mL    Noroxycodone <25 Cutoff <25 ng/mL    Oxycodone <25 Cutoff <25 ng/mL    Oxymorphone <25 Cutoff <25 ng/mL    Tramadol <50 Cutoff <50 ng/mL    O-Desmethyltramadol <50 Cutoff <50 ng/mL    Fentanyl <2.5 Cutoff<2.5 ng/mL    Norfentanyl <2.5 Cutoff<2.5 ng/mL    METHADONE CONFIRMATION,URINE <25 Cutoff <25 ng/mL    EDDP <25 Cutoff <25 ng/mL     Controlled Substance Agreement:  Date of the Last Agreement: 3/4/2024  Reviewed Controlled Substance Agreement including but not limited to the benefits, risks, and alternatives to treatment with a Controlled Substance medication(s).    Benzodiazepines:  What is the patient's goal of therapy? Able to function without being unduly anxious and carry out day-to-day activities  without tension and panic attacks    Is this being achieved with current treatment? yes    JOSE-7:  Over the last 2 weeks, how often have you been bothered by any of the following problems?  Feeling nervous, anxious, or on edge: 1  Not being able to stop or control worryin  Worrying too much about different things: 0  Trouble relaxin  Being so restless that it is hard to sit still: 0  Becoming easily annoyed or irritable: 0  Feeling afraid as if something awful might happen: 0  JOSE-7 Total Score: 1    Activities of Daily Living:   Is your overall impression that this patient is benefiting (symptom reduction outweighs side effects) from benzodiazepine therapy? Yes     1. Physical Functioning: Better  2. Family Relationship: Better  3. Social Relationship: Better  4. Mood: Better  5. Sleep Patterns: Better  6. Overall Function: Better    Scribe Attestation  By signing my name below, IEze Scribe   attest that this documentation has been prepared under the direction and in the presence of Darius Quezada MD.

## 2024-09-09 ENCOUNTER — APPOINTMENT (OUTPATIENT)
Dept: RADIOLOGY | Facility: CLINIC | Age: 89
End: 2024-09-09
Payer: MEDICARE

## 2024-09-09 ENCOUNTER — HOSPITAL ENCOUNTER (OUTPATIENT)
Dept: RADIOLOGY | Facility: CLINIC | Age: 89
Discharge: HOME | End: 2024-09-09
Payer: MEDICARE

## 2024-09-09 DIAGNOSIS — R91.1 LUNG NODULE: ICD-10-CM

## 2024-09-09 DIAGNOSIS — C34.11 MALIGNANT NEOPLASM OF UPPER LOBE, RIGHT BRONCHUS OR LUNG (MULTI): ICD-10-CM

## 2024-09-09 PROCEDURE — 3430000001 HC RX 343 DIAGNOSTIC RADIOPHARMACEUTICALS: Performed by: NURSE PRACTITIONER

## 2024-09-09 PROCEDURE — 78815 PET IMAGE W/CT SKULL-THIGH: CPT | Mod: PET TUMOR INIT TX STRAT | Performed by: STUDENT IN AN ORGANIZED HEALTH CARE EDUCATION/TRAINING PROGRAM

## 2024-09-09 PROCEDURE — 78815 PET IMAGE W/CT SKULL-THIGH: CPT | Mod: PI

## 2024-09-09 PROCEDURE — A9552 F18 FDG: HCPCS | Performed by: NURSE PRACTITIONER

## 2024-09-09 RX ORDER — FLUDEOXYGLUCOSE F 18 200 MCI/ML
13.2 INJECTION, SOLUTION INTRAVENOUS
Status: COMPLETED | OUTPATIENT
Start: 2024-09-09 | End: 2024-09-09

## 2024-09-25 NOTE — PROGRESS NOTES
Patient: Katelyn Weiss    [id number]  : 1935 -- AGE 89 y.o.    Provider: Angie WALTON- CNP     Location [facility] [place]   Service Date: 10/2/24            [facility]  New Visit Note    Virtual or Telephone Consent  A telephone visit (audio only) between the patient (at the originating site) and the provider (at the distant site) was utilized to provide this telehealth service.   Verbal consent was requested and obtained from Katelyn Weiss on this date, 10/02/24 for a telehealth visit.     HISTORY OF PRESENT ILLNESS     The patient's referring provider is: No ref. provider found    HISTORY OF PRESENT ILLNESS   Katelyn Weiss is a 89 y.o. female with a history of Diabetes type 2, hypertension, hyperlipidemia, acquired hypothyroidism, CKD stage III, CAD, chronic anemia, COPD and anxiety who is a former smoker (~35 pack years), who presents to a [facility] for an initial evaluation for lung nodules.     I have independently interviewed and examined the patient in the office and reviewed available records.    Current History    Since last visit she reports her respiratory status has been stable.  She has not had any acute respiratory events or any more hemoptysis episodes since last visit.   Reviewed PET/CT with her today.    24: On today's visit, the patient reports having a recent ER visit 2024 for hemoptysis.  Was ill with a cough congestion for a week. Tx for bronchitis given steroids, Zpack and q4 breathing treatments.  Has not had any further hemoptysis.    Has occasional dry cough.  States she has COPD, only has an albuterol inhaler that she rarely uses.  She denies fevers, chills, wheezing, dyspnea on exertion and chest tightness.    Previous pulmonary history: COPD/ emphysema    Inhalers/nebulized medications: albuterol    Hospitalization History: She has not been hospitalized over the last year for breathing  related problem.    Sleep history: Denies snoring, apnea, feeling tired during the day or taking naps during the day.        ALLERGIES AND MEDICATIONS     ALLERGIES  Allergies   Allergen Reactions    Bee Venom Protein (Honey Bee) Anaphylaxis    Losartan Potassium Unknown    Sulfa (Sulfonamide Antibiotics) Unknown    Sulfamethoxazole Unknown       MEDICATIONS  Current Outpatient Medications   Medication Sig Dispense Refill    albuterol 90 mcg/actuation inhaler INHALE 1 TO 2 PUFFS EVERY 4 TO 6 HOURS AS NEEDED. 18 g 5    ALPRAZolam (Xanax) 1 mg tablet Take 1 tablet (1 mg) by mouth 2 times a day as needed for anxiety (for 30 days). 60 tablet 1    aspirin 81 mg chewable tablet Chew.      atorvastatin (Lipitor) 20 mg tablet Take 1 tablet (20 mg) by mouth once daily. 90 tablet 2    azithromycin (Zithromax Z-Danny) 250 mg tablet Take 2 tablets today, then 1 tablet daily (Patient not taking: Reported on 9/4/2024) 6 tablet 0    bimatoprost (Lumigan) 0.03 % ophthalmic solution Administer into affected eye(s).      cyanocobalamin (Vitamin B-12) 1,000 mcg tablet Take 1 tablet (1,000 mcg) by mouth once daily. 30 tablet 3    cyanocobalamin (Vitamin B-12) 1,000 mcg/mL injection 1 mL (1,000 mcg) every 14 (fourteen) days. Inject      docusate sodium (Colace) 100 mg capsule Take 1 capsule (100 mg) by mouth 2 times a day.      fluticasone (Flonase) 50 mcg/actuation nasal spray Administer 2 sprays into each nostril once daily. SHAKE LIQUID      lancets 33 gauge misc       latanoprost (Xalatan) 0.005 % ophthalmic solution INSTILL 1 DROP BOTH EYES AT BEDTIME      levothyroxine (Synthroid, Levoxyl) 50 mcg tablet Take 1 tablet (50 mcg) by mouth once daily in the morning. Take before meals. 90 tablet 1    meloxicam (Mobic) 15 mg tablet Take 1 tablet (15 mg) by mouth once daily. Take with food 30 tablet 0    metFORMIN  mg 24 hr tablet Take 1 tablet (500 mg) by mouth once daily in the evening. Take with meals. 90 tablet 1    metoprolol  tartrate (Lopressor) 25 mg tablet Take 0.5 tablets (12.5 mg) by mouth. 2 times daily      NIFEdipine ER (Adalat CC) 30 mg 24 hr tablet Take 1 tablet (30 mg) by mouth once daily. 90 tablet 2    nitroglycerin (Nitrostat) 0.4 mg SL tablet Place 1 tablet (0.4 mg) under the tongue every 5 minutes if needed for chest pain. 25 tablet 1    OneTouch Ultra Test strip TEST 3 TIMES DAILY.TEST 3 TIMES DAILY. 300 strip 3    pantoprazole (ProtoNix) 40 mg EC tablet Take 1 tablet (40 mg) by mouth once daily. 90 tablet 1    tamsulosin (Flomax) 0.4 mg 24 hr capsule Take 1 capsule (0.4 mg) by mouth once daily.      timolol (Timoptic) 0.5 % ophthalmic solution Administer 1 drop into both eyes once daily.       No current facility-administered medications for this visit.         PAST HISTORY     PAST MEDICAL HISTORY  COPD  Emphysema  Type 2 diabetes  Hypertension  Hyperlipidemia  Acquired hypothyroidism  Anxiety  CKD stage III  Chronic anemia    PAST SURGICAL HISTORY  Past Surgical History:   Procedure Laterality Date    MR CHEST ANGIO W IV CONTRAST  12/5/2014    MR CHEST ANGIO W IV CONTRAST 12/5/2014 [facility] ANCILLARY LEGACY    OTHER SURGICAL HISTORY  02/27/2019    Umbilical hernia repair       IMMUNIZATION HISTORY  Immunization History   Administered Date(s) Administered    Influenza, seasonal, injectable 09/09/2021    Pfizer Purple Cap SARS-CoV-2 02/09/2021, 03/13/2021, 10/28/2021    Pneumococcal conjugate vaccine, 13-valent (PREVNAR 13) 10/07/2015    Pneumococcal polysaccharide vaccine, 23-valent, age 2 years and older (PNEUMOVAX 23) 09/18/2007, 02/07/2014       SOCIAL HISTORY  Tobacco Smoking: late 20 - 59 y/o- 1/2- 1 ppd - ~35 pack years  Illicit drugs: none  Alcohol consumption: none   Pets: none    OCCUPATIONAL/ENVIRONMENTAL HISTORY  Occupation: . 30+ years  No known exposure to asbestos, silica, beryllium or inhaled metals.  No exposure to birds or exotic animals.    FAMILY HISTORY  Family History   Problem Relation Name Age  of Onset    Aneurysm Father      Colon cancer Daughter       Mother - Lung cancer   No family history of autoimmune disorders.    REVIEW OF SYSTEMS     REVIEW OF SYSTEMS  Review of Systems    Constitutional: No fever, no chills, no night sweats.    Eyes: No double vision, no floaters, no dry eyes.   ENT: See HPI.   Neck: No neck stiffness.  Cardiovascular: No sharp chest pain, no heart racing, no leg swelling.  Respiratory: as noted in HPI.   Gastrointestinal: No nausea, no vomiting, no diarrhea.   Musculoskeletal: No joint pain, no back pain.   Integumentary: No rashes or sores.  Neurological: No dizziness, no headaches. Sleeping well.  Psychiatric: No mood changes.   Endocrine: No hot flashes, no cold intolerance, weight is stable.  Hematologic: No easy bruising or bleeding.    PHYSICAL EXAM     VITAL SIGNS:   There were no vitals filed for this visit.         CURRENT WEIGHT: There is no height or weight on file to calculate BMI.    PREVIOUS WEIGHTS:  Wt Readings from Last 3 Encounters:   09/04/24 59 kg (130 lb)   09/04/24 56.7 kg (125 lb)   08/06/24 56.7 kg (125 lb)       Physical Exam    Constitutional: General appearance: Alert and oriented.  No acute distress.  Psychiatric: Intact judgement and insight.    RESULTS/DATA     Pulmonary Function Test Results     No testing done.    Chest Radiograph     XR chest 1 view 06/22/2023    Narrative  Interpreted By:  MARY ALCAZAR MD  STUDY:  Chest Radiograph;  6/22/2023 1:53 PM.    INDICATION:  Fall.    COMPARISON:  Chest radiographs 3/22/2023.    ACCESSION NUMBER(S):  [id number]    ORDERING CLINICIAN:  FATOU CASAS MD    TECHNIQUE:  Frontal chest was obtained at 13:48 hours.    FINDINGS:    CARDIOMEDIASTINAL SILHOUETTE:  Cardiomediastinal silhouette is normal in size and configuration.    LUNGS:  Lungs are mildly hyperinflated without focal infiltrates, edema or  effusion..    ABDOMEN:  No remarkable upper abdominal findings.    BONES:  No acute osseous  changes.    Impression  Mild hyperinflation without focal infiltrates, edema or effusion.      Signed by Vincenzo Yadav MD      Chest CT Scan     Narrative & Impression   Interpreted By:  Israel Ceballos and Maltbie Grace   STUDY:  NM PET CT LUNG CA  INITIAL DIAGNOSIS;  9/9/2024 10:55 am      INDICATION:  Signs/Symptoms:1.3mm x 5 mm spiculatedRUL nodule.  ,R91.1 Solitary pulmonary nodule,C34.11 Malignant neoplasm of upper  lobe, right bronchus or lung (Multi)      COMPARISON:  CT chest 08/06/2024      ACCESSION NUMBER(S):  [id number]      ORDERING CLINICIAN:  LATHA RUSH      TECHNIQUE:  TECHNIQUE  DIVISION OF NUCLEAR MEDICINE  POSITRON EMISSION TOMOGRAPHY (PET-CT)      The patient received an intravenous dose of 13.2 mCi of Fluorine-18  fluorodeoxyglucose (FDG). Positron emission tomographic (PET) images  from skull base to the mid-thighs were then acquired after a one hour  delay. Also acquired was a contemporaneous low dose non-contrast CT  scan performed for attenuation correction of PET images and anatomic  localization. The PET and CT images were digitally fused for display.  All images were acquired on a combined PET-CT scanner unit. Some  areas of FDG accumulation may be described in standardized uptake  value (SUV) units.      CODING:  Initial Treatment Strategy (PI)      CALIBRATION:  Dose Injection-to-Scan Interval (mins): 78 min  Mediastinal bloodpool SUV (normal 1.5-2.5): 2.2  Blood glucose: 102 mg/dL      FINDINGS:  HEAD AND NECK:  *No evidence of focal hypermetabolic lesion in the brain parenchyma,  noting that evaluation is limited because of the expected physiologic  diffuse FDG uptake in the brain. *No FDG avid cervical  lymphadenopathy is present.      CHEST:  *The right upper lobe nodule is not FDG avid. Left lung is  unremarkable. *No FDG avid mediastinal, hilar or axillary  lymphadenopathy. *Both breasts are unremarkable.      ABDOMEN AND PELVIS:  *No FDG avid lymphadenopathy in the abdomen  "and pelvis.  *Physiologic radiotracer uptake is present in the liver and spleen  with excretion into the bowel loops and the genitourinary tract.  Right renal cyst *Unremarkable bilateral adrenal glands.      MUSCULOSKELETAL/EXTREMITIES:  *No concerning FDG avid bone lesion throughout the axial and  appendicular skeleton. Postsurgical changes in the left femur.      IMPRESSION:  1. The right upper lobe nodule is non FDG avid, a non FDG avid  malignancy can not be completely excluded, follow-up chest CT is  recommended.  2. No other concerning FDG avid disease identified.      I personally reviewed the images/study and I agree with the findings  as stated by Eliz Bruce MD. This study was interpreted at  [place], [place].      MACRO:  None      Signed by: Israel Ceballos 9/9/2024 1:24 PM  Dictation workstation:   [id number]     Echocardiogram     No testing done.       Labwork   Complete Blood Count  Lab Results   Component Value Date    WBC 5.2 09/04/2024    HGB 12.2 09/04/2024    HCT 37.8 09/04/2024    MCV 92 09/04/2024     09/04/2024       Peripheral Eosinophil Count/Percentage:   Eosinophils Absolute (x10*3/uL)   Date Value   09/04/2024 0.21     Eosinophils % (%)   Date Value   09/04/2024 4.1       Serum Immunoglobulin E:    No results found for: \"IGE\"     ASSESSMENT/PLAN   Ms. Weiss is a 89 y.o. female, with a history of Diabetes type 2, hypertension, hyperlipidemia, acquired hypothyroidism, CKD stage III, CAD, chronic anemia, COPD and anxiety who is a former smoker (~35 pack years), who presents to a [facility] for an initial evaluation for lung nodules.     Problem List and Orders  Problem List Items Addressed This Visit    None        Assessment and Plan / Recommendations:  Problem List Items Addressed This Visit    None     Lung nodule; 1.3mm x 5 mm spiculated nodule RUL  Right upper lobe nodule is non FDG avid  - " repeat CT Chest in 3 months    Emphysema on CT   - continue albuterol HFA or albuterol nebulizers every 4-6 hours as needed for shortness of breath    Follow up in 6 weeks (after CT Chest) or sooner if needed - can be telephone    If you have any questions please call the office [phone]    Thank you for visiting the Pulmonary clinic today!   Angie Robert CNP  [phone]

## 2024-10-02 ENCOUNTER — APPOINTMENT (OUTPATIENT)
Dept: PULMONOLOGY | Facility: CLINIC | Age: 89
End: 2024-10-02
Payer: MEDICARE

## 2024-10-02 VITALS — WEIGHT: 125 LBS | BODY MASS INDEX: 20.83 KG/M2 | HEIGHT: 65 IN

## 2024-10-02 DIAGNOSIS — R91.1 LUNG NODULE: Primary | ICD-10-CM

## 2024-10-02 PROCEDURE — 1123F ACP DISCUSS/DSCN MKR DOCD: CPT

## 2024-10-02 PROCEDURE — 1126F AMNT PAIN NOTED NONE PRSNT: CPT

## 2024-10-02 PROCEDURE — 99214 OFFICE O/P EST MOD 30 MIN: CPT

## 2024-10-02 PROCEDURE — 1159F MED LIST DOCD IN RCRD: CPT

## 2024-10-02 PROCEDURE — 1036F TOBACCO NON-USER: CPT

## 2024-10-02 ASSESSMENT — ENCOUNTER SYMPTOMS
OCCASIONAL FEELINGS OF UNSTEADINESS: 0
DEPRESSION: 0
LOSS OF SENSATION IN FEET: 0

## 2024-10-02 ASSESSMENT — PAIN SCALES - GENERAL: PAINLEVEL: 0-NO PAIN

## 2024-10-23 DIAGNOSIS — I10 ESSENTIAL HYPERTENSION: ICD-10-CM

## 2024-10-23 RX ORDER — NIFEDIPINE 30 MG/1
30 TABLET, FILM COATED, EXTENDED RELEASE ORAL DAILY
Qty: 90 TABLET | Refills: 2 | Status: SHIPPED | OUTPATIENT
Start: 2024-10-23

## 2024-10-23 NOTE — TELEPHONE ENCOUNTER
Rx Refill Request     Name: Katelyn Weiss  :  1935   Medication Name:  NIFEdipine ER (Adalat CC) 30 mg 24 hr tablet   Specific Pharmacy location:  [id number]   Date of last appointment:  2024   Date of next appointment:  2024   Best number to reach patient:  [phone]

## 2024-11-04 ENCOUNTER — HOSPITAL ENCOUNTER (OUTPATIENT)
Dept: RADIOLOGY | Facility: CLINIC | Age: 89
Discharge: HOME | End: 2024-11-04
Payer: MEDICARE

## 2024-11-04 DIAGNOSIS — R91.1 LUNG NODULE: ICD-10-CM

## 2024-11-04 PROCEDURE — 71250 CT THORAX DX C-: CPT

## 2024-11-04 PROCEDURE — 71250 CT THORAX DX C-: CPT | Performed by: RADIOLOGY

## 2024-11-11 NOTE — PROGRESS NOTES
Patient: Katelyn Weiss    [id number]  : 1935 -- AGE 89 y.o.    Provider: Angie WALTON- CNP     [place] [facility] [place]: [place]            [facility]  New Visit Note    Virtual or Telephone Consent  A telephone visit (audio only) between the patient (at the originating site) and the provider (at the distant site) was utilized to provide this telehealth service.   Verbal consent was requested and obtained from Katelyn Weiss on this date, 24 for a telehealth visit.       HISTORY OF PRESENT ILLNESS     The patient's referring provider is: No ref. provider found    HISTORY OF PRESENT ILLNESS   Katelyn Weiss is a 89 y.o. female with a history of Diabetes type 2, hypertension, hyperlipidemia, acquired hypothyroidism, CKD stage III, CAD, chronic anemia, COPD and anxiety who is a former smoker (~35 pack years), who presents to a [facility] for an initial evaluation for lung nodules.     I have independently interviewed and examined the patient in the office and reviewed available records.    Current History    Since last visit she reports her respiratory status has been stable. No respiratory events.   Reviewed CT Chest with her today.     10/2/24: Since last visit she reports her respiratory status has been stable.  She has not had any acute respiratory events or any more hemoptysis episodes since last visit.   Reviewed PET/CT with her today.    24: On today's visit, the patient reports having a recent ER visit 2024 for hemoptysis.  Was ill with a cough congestion for a week. Tx for bronchitis given steroids, Zpack and q4 breathing treatments.  Has not had any further hemoptysis.    Has occasional dry cough.  States she has COPD, only has an albuterol inhaler that she rarely uses.  She denies fevers, chills, wheezing, dyspnea on exertion and chest tightness.    Previous pulmonary history: COPD/  emphysema    Inhalers/nebulized medications: albuterol    Hospitalization History: She has not been hospitalized over the last year for breathing related problem.    Sleep history: Denies snoring, apnea, feeling tired during the day or taking naps during the day.        ALLERGIES AND MEDICATIONS     ALLERGIES  Allergies   Allergen Reactions    Bee Venom Protein (Honey Bee) Anaphylaxis    Losartan Potassium Unknown    Sulfa (Sulfonamide Antibiotics) Unknown    Sulfamethoxazole Unknown       MEDICATIONS  Current Outpatient Medications   Medication Sig Dispense Refill    albuterol 90 mcg/actuation inhaler INHALE 1 TO 2 PUFFS EVERY 4 TO 6 HOURS AS NEEDED. 18 g 5    ALPRAZolam (Xanax) 1 mg tablet Take 1 tablet (1 mg) by mouth 2 times a day as needed for anxiety (for 30 days). 60 tablet 1    aspirin 81 mg chewable tablet Chew.      atorvastatin (Lipitor) 20 mg tablet Take 1 tablet (20 mg) by mouth once daily. 90 tablet 2    azithromycin (Zithromax Z-Danny) 250 mg tablet Take 2 tablets today, then 1 tablet daily 6 tablet 0    bimatoprost (Lumigan) 0.03 % ophthalmic solution Administer into affected eye(s).      cyanocobalamin (Vitamin B-12) 1,000 mcg tablet Take 1 tablet (1,000 mcg) by mouth once daily. 30 tablet 3    cyanocobalamin (Vitamin B-12) 1,000 mcg/mL injection 1 mL (1,000 mcg) every 14 (fourteen) days. Inject      docusate sodium (Colace) 100 mg capsule Take 1 capsule (100 mg) by mouth 2 times a day.      fluticasone (Flonase) 50 mcg/actuation nasal spray Administer 2 sprays into each nostril once daily. SHAKE LIQUID      lancets 33 gauge misc       latanoprost (Xalatan) 0.005 % ophthalmic solution INSTILL 1 DROP BOTH EYES AT BEDTIME      levothyroxine (Synthroid, Levoxyl) 50 mcg tablet Take 1 tablet (50 mcg) by mouth once daily in the morning. Take before meals. 90 tablet 1    meloxicam (Mobic) 15 mg tablet Take 1 tablet (15 mg) by mouth once daily. Take with food 30 tablet 0    metFORMIN  mg 24 hr tablet  Take 1 tablet (500 mg) by mouth once daily in the evening. Take with meals. 90 tablet 1    metoprolol tartrate (Lopressor) 25 mg tablet Take 0.5 tablets (12.5 mg) by mouth. 2 times daily      NIFEdipine ER (Adalat CC) 30 mg 24 hr tablet Take 1 tablet (30 mg) by mouth once daily. 90 tablet 2    nitroglycerin (Nitrostat) 0.4 mg SL tablet Place 1 tablet (0.4 mg) under the tongue every 5 minutes if needed for chest pain. 25 tablet 1    OneTouch Ultra Test strip TEST 3 TIMES DAILY.TEST 3 TIMES DAILY. 300 strip 3    pantoprazole (ProtoNix) 40 mg EC tablet Take 1 tablet (40 mg) by mouth once daily. 90 tablet 1    tamsulosin (Flomax) 0.4 mg 24 hr capsule Take 1 capsule (0.4 mg) by mouth once daily.      timolol (Timoptic) 0.5 % ophthalmic solution Administer 1 drop into both eyes once daily.       No current facility-administered medications for this visit.         PAST HISTORY     PAST MEDICAL HISTORY  COPD  Emphysema  Type 2 diabetes  Hypertension  Hyperlipidemia  Acquired hypothyroidism  Anxiety  CKD stage III  Chronic anemia    PAST SURGICAL HISTORY  Past Surgical History:   Procedure Laterality Date    MR CHEST ANGIO W IV CONTRAST  12/5/2014    MR CHEST ANGIO W IV CONTRAST 12/5/2014 [facility] [place] ANCILLARY LEGACY    OTHER SURGICAL HISTORY  02/27/2019    Umbilical hernia repair       IMMUNIZATION HISTORY  Immunization History   Administered Date(s) Administered    Influenza, seasonal, injectable 09/09/2021    Pfizer Purple Cap SARS-CoV-2 02/09/2021, 03/13/2021, 10/28/2021    Pneumococcal conjugate vaccine, 13-valent (PREVNAR 13) 10/07/2015    Pneumococcal polysaccharide vaccine, 23-valent, age 2 years and older (PNEUMOVAX 23) 09/18/2007, 02/07/2014       SOCIAL HISTORY  Tobacco Smoking: late 20 - 61 y/o- 1/2- 1 ppd - ~35 pack years  Illicit drugs: none  Alcohol consumption: none   Pets: none    OCCUPATIONAL/ENVIRONMENTAL HISTORY  Occupation: . 30+ years  No known exposure to asbestos, silica, beryllium or inhaled  metals.  No exposure to birds or exotic animals.    FAMILY HISTORY  Family History   Problem Relation Name Age of Onset    Aneurysm Father      Colon cancer Daughter       Mother - Lung cancer   No family history of autoimmune disorders.    REVIEW OF SYSTEMS     REVIEW OF SYSTEMS  Review of Systems    Constitutional: No fever, no chills, no night sweats.    Eyes: No double vision, no floaters, no dry eyes.   ENT: See HPI.   Neck: No neck stiffness.  Cardiovascular: No sharp chest pain, no heart racing, no leg swelling.  Respiratory: as noted in HPI.   Gastrointestinal: No nausea, no vomiting, no diarrhea.   Musculoskeletal: No joint pain, no back pain.   Integumentary: No rashes or sores.  Neurological: No dizziness, no headaches. Sleeping well.  Psychiatric: No mood changes.   Endocrine: No hot flashes, no cold intolerance, weight is stable.  Hematologic: No easy bruising or bleeding.    PHYSICAL EXAM     VITAL SIGNS:   There were no vitals filed for this visit.         CURRENT WEIGHT: There is no height or weight on file to calculate BMI.    PREVIOUS WEIGHTS:  Wt Readings from Last 3 Encounters:   10/02/24 56.7 kg (125 lb)   09/04/24 59 kg (130 lb)   09/04/24 56.7 kg (125 lb)       Physical Exam    Constitutional: General appearance: Alert and oriented.  No acute distress.  Psychiatric: Intact judgement and insight.    RESULTS/DATA     Pulmonary Function Test Results     No testing done.    Chest Radiograph     XR chest 1 view 06/22/2023    Narrative  Interpreted By:  MARY ALCAZAR MD  STUDY:  Chest Radiograph;  6/22/2023 1:53 PM.    INDICATION:  Fall.    COMPARISON:  Chest radiographs 3/22/2023.    ACCESSION NUMBER(S):  [id number]    ORDERING CLINICIAN:  FATOU CASAS MD    TECHNIQUE:  Frontal chest was obtained at 13:48 hours.    FINDINGS:    CARDIOMEDIASTINAL SILHOUETTE:  Cardiomediastinal silhouette is normal in size and configuration.    LUNGS:  Lungs are mildly hyperinflated without focal  infiltrates, edema or  effusion..    ABDOMEN:  No remarkable upper abdominal findings.    BONES:  No acute osseous changes.    Impression  Mild hyperinflation without focal infiltrates, edema or effusion.      Signed by Vincenzo Yadav MD      Chest CT Scan     CT CHEST WO IV CONTRAST; 11/4/2024       INDICATION:  Signs/Symptoms:RUL lung nodule.      COMPARISON:  Selected images from August 6, 2024 chest CT and September 9, 2024  PET-CT      ACCESSION NUMBER(S):  [id number]      ORDERING CLINICIAN:  LATHA RUSH      TECHNIQUE:  Noncontrast CT images of the chest were performed with coronal and  sagittal reformatted images.      FINDINGS:  SUPPORT DEVICES:   None.      CHEST WALL AND LOWER NECK:   No supraclavicular or axillary  lymphadenopathy by CT size criteria.      MEDIASTINUM AND KANCHAN:   No lymphadenopathy by CT size criteria.      Cardiovascular structures: Heart size within normal limits. There are  scattered arterial vascular calcifications including extensive  coronary artery calcifications. No aneurysm. No significant  pericardial effusion.      LUNG, PLEURA, LARGE AIRWAYS:   Right upper lobe spiculated tubular  nodular process measures about 14 x 6 x 5 mm image 205/99, and image  203/63 compared with my measurement of about 14 x 5 x 5 mm. The  process is not substantially changed allowing for differences in  technique similar distribution of scattered tiny pulmonary nodules  including similar nodule left mid chest measuring about 6 mm image  205/115 right base measuring about 4 mm image 205/192. No evident  consolidative pneumonia, edema, or effusion.      UPPER ABDOMEN:   Scattered marginated homogeneous renal lesions  favoring simple cyst and cysts containing hyperdense debris including  dominant simple cyst estimated at about 3 cm transverse diameter  lateral midportion right kidney.      BONES:   No aggressive osseous lesions. Probably benign striated  lucencies including T10 and T11 favoring  "osseous hemangiomas. There  is leftward curvature thoracolumbar spine with similar degenerative  changes.      IMPRESSION:  Spiculated tubular nodular lesion right upper lobe is not  substantially measurably changed allowing for differences in  technique favoring probably benign, indolent cause although  nonspecific. Follow-up chest CT at about 6-12 month interval  recommended.      No acute abnormality.      Signed by: Morgan Chan 11/7/2024 2:12 PM  Dictation workstation:   [id number]    Echocardiogram     No testing done.       Labwork   Complete Blood Count  Lab Results   Component Value Date    WBC 5.2 09/04/2024    HGB 12.2 09/04/2024    HCT 37.8 09/04/2024    MCV 92 09/04/2024     09/04/2024       Peripheral Eosinophil Count/Percentage:   Eosinophils Absolute (x10*3/uL)   Date Value   09/04/2024 0.21     Eosinophils % (%)   Date Value   09/04/2024 4.1       Serum Immunoglobulin E:    No results found for: \"IGE\"     ASSESSMENT/PLAN   Ms. Weiss is a 89 y.o. female, with a history of Diabetes type 2, hypertension, hyperlipidemia, acquired hypothyroidism, CKD stage III, CAD, chronic anemia, COPD and anxiety who is a former smoker (~35 pack years), who presents to a [facility] for an initial evaluation for lung nodules.     Problem List and Orders  Problem List Items Addressed This Visit    None        Assessment and Plan / Recommendations:  Problem List Items Addressed This Visit    None     Lung nodule; 1.3mm x 5 mm spiculated nodule RUL  Right upper lobe nodule is non FDG avid  - repeat CT Chest in 6 months    Emphysema on CT   - continue albuterol HFA or albuterol nebulizers every 4-6 hours as needed for shortness of breath    Follow up in 6 months (after CT Chest) or sooner if needed - can be telephone    If you have any questions please call the office [phone]    Thank you for visiting the Pulmonary clinic today!   Angie Robert CNP  [phone]   "

## 2024-11-13 ENCOUNTER — APPOINTMENT (OUTPATIENT)
Dept: PULMONOLOGY | Facility: CLINIC | Age: 89
End: 2024-11-13
Payer: MEDICARE

## 2024-11-13 DIAGNOSIS — R91.1 LUNG NODULE: Primary | ICD-10-CM

## 2024-11-13 PROCEDURE — 99214 OFFICE O/P EST MOD 30 MIN: CPT

## 2024-11-13 PROCEDURE — 1123F ACP DISCUSS/DSCN MKR DOCD: CPT

## 2024-11-24 DIAGNOSIS — I20.89 ANGINA AT REST (HCC): ICD-10-CM

## 2024-11-25 RX ORDER — METOPROLOL TARTRATE 25 MG/1
TABLET, FILM COATED ORAL
Qty: 90 TABLET | Refills: 3 | Status: SHIPPED | OUTPATIENT
Start: 2024-11-25

## 2024-11-25 NOTE — TELEPHONE ENCOUNTER
Requesting medication refill. Please approve or deny this request.    Rx requested:  Requested Prescriptions     Pending Prescriptions Disp Refills    metoprolol tartrate (LOPRESSOR) 25 MG tablet [Pharmacy Med Name: METOPROLOL TARTRATE 25MG TABLETS] 90 tablet 1     Sig: TAKE 1/2 TABLET BY MOUTH TWICE DAILY         Last Office Visit:   11/14/2023      Next Visit Date:  Future Appointments   Date Time Provider Department Center   12/3/2024 10:15 AM Rachelle, DO [facility]               Last refill 5/23/24. Please approve or deny.

## 2024-12-03 ENCOUNTER — OFFICE VISIT (OUTPATIENT)
Dept: CARDIOLOGY CLINIC | Age: 88
End: 2024-12-03

## 2024-12-03 VITALS
HEART RATE: 56 BPM | OXYGEN SATURATION: 97 % | BODY MASS INDEX: 22.23 KG/M2 | WEIGHT: 133.6 LBS | SYSTOLIC BLOOD PRESSURE: 132 MMHG | RESPIRATION RATE: 16 BRPM | DIASTOLIC BLOOD PRESSURE: 52 MMHG

## 2024-12-03 DIAGNOSIS — E78.2 MIXED HYPERLIPIDEMIA: Primary | ICD-10-CM

## 2024-12-03 DIAGNOSIS — I10 ESSENTIAL HYPERTENSION: ICD-10-CM

## 2024-12-03 NOTE — PROGRESS NOTES
Chief Complaint   Patient presents with    1 Year Follow Up    Hypertension    Hyperlipidemia       2-27-18: Patient presents for initial medical evaluation. Patient is followed on a regular basis by Dr. Schneider, Willie RODRIGUEZ MD. Was recently hospitalized at [facility] for CP. C.E were negative. CXR was negative. ECHO with normal LVF, mild LVH. Pain radiated to left arm.  S/p normal nuclear stress test with EF of 75%. She is having a hard time breathing, states she can't catch her breath. She does get tired easily as well. She smoked for a long time and quit 25 yrs ago or so. She was a /. She denies any re occurrence of her CP. Pt denies chest pain, nausea, vomiting, diarrhea, constipation, motor weakness, insomnia, weight loss, syncope, dizziness, lightheadedness, palpitations, PND, orthopnea. EKG with RBBB.        5-29-18: s/p PFT showing severe COPD with good response to bronchodilators. She will see Dr. Schneider next week. Does have SOB at baseline and worse with exertion. Better with breathing tx. Pt denies chest pain,   nausea, vomiting, diarrhea, constipation, motor weakness, insomnia, weight loss, syncope, dizziness, lightheadedness, palpitations, PND, orthopnea.   BP and hr are good. CAD is stable. No LE discoloration or ulcers. No LE edema. No CHF type symptoms. Lipid profile is normal. No recent hospitalization. No change in meds.     11-27-18: doing ok overall. Has baseline SOB due to COPD. No smoking, she was a , . Pt denies chest pain,  fatigue,  nausea, vomiting, diarrhea, constipation, motor weakness, insomnia, weight loss, syncope, dizziness, lightheadedness, palpitations, PND, orthopnea, or claudication. No nitro use. BP is fernando today a bit. States it good at home and low at times. CAD is stable. No LE discoloration or ulcers. No LE edema. No CHF type symptoms. Lipid profile is normal. No recent hospitalization. No change in meds. Has severe COPD on PFT. EKG with NSR, RBBB.

## 2024-12-06 ENCOUNTER — LAB (OUTPATIENT)
Dept: LAB | Facility: LAB | Age: 89
End: 2024-12-06
Payer: MEDICARE

## 2024-12-06 ENCOUNTER — APPOINTMENT (OUTPATIENT)
Dept: PRIMARY CARE | Facility: CLINIC | Age: 89
End: 2024-12-06
Payer: MEDICARE

## 2024-12-06 VITALS
OXYGEN SATURATION: 98 % | WEIGHT: 133 LBS | HEIGHT: 65 IN | BODY MASS INDEX: 22.16 KG/M2 | RESPIRATION RATE: 16 BRPM | HEART RATE: 75 BPM | TEMPERATURE: 97.8 F | DIASTOLIC BLOOD PRESSURE: 60 MMHG | SYSTOLIC BLOOD PRESSURE: 118 MMHG

## 2024-12-06 DIAGNOSIS — I10 ESSENTIAL HYPERTENSION: ICD-10-CM

## 2024-12-06 DIAGNOSIS — Z23 NEED FOR INFLUENZA VACCINATION: ICD-10-CM

## 2024-12-06 DIAGNOSIS — E11.22 TYPE 2 DIABETES MELLITUS WITH STAGE 3A CHRONIC KIDNEY DISEASE, WITHOUT LONG-TERM CURRENT USE OF INSULIN (MULTI): ICD-10-CM

## 2024-12-06 DIAGNOSIS — N18.31 TYPE 2 DIABETES MELLITUS WITH STAGE 3A CHRONIC KIDNEY DISEASE, WITHOUT LONG-TERM CURRENT USE OF INSULIN (MULTI): ICD-10-CM

## 2024-12-06 DIAGNOSIS — E11.22 TYPE 2 DIABETES MELLITUS WITH STAGE 3A CHRONIC KIDNEY DISEASE, WITHOUT LONG-TERM CURRENT USE OF INSULIN (MULTI): Primary | ICD-10-CM

## 2024-12-06 DIAGNOSIS — E03.9 ACQUIRED HYPOTHYROIDISM: ICD-10-CM

## 2024-12-06 DIAGNOSIS — E78.2 MIXED HYPERLIPIDEMIA: ICD-10-CM

## 2024-12-06 DIAGNOSIS — N18.31 TYPE 2 DIABETES MELLITUS WITH STAGE 3A CHRONIC KIDNEY DISEASE, WITHOUT LONG-TERM CURRENT USE OF INSULIN (MULTI): Primary | ICD-10-CM

## 2024-12-06 DIAGNOSIS — F41.1 GENERALIZED ANXIETY DISORDER: ICD-10-CM

## 2024-12-06 DIAGNOSIS — K59.09 CHRONIC CONSTIPATION: ICD-10-CM

## 2024-12-06 LAB
ALBUMIN SERPL BCP-MCNC: 4.5 G/DL (ref 3.4–5)
ALP SERPL-CCNC: 84 U/L (ref 33–136)
ALT SERPL W P-5'-P-CCNC: 9 U/L (ref 7–45)
ANION GAP SERPL CALC-SCNC: 12 MMOL/L (ref 10–20)
AST SERPL W P-5'-P-CCNC: 16 U/L (ref 9–39)
BILIRUB SERPL-MCNC: 0.7 MG/DL (ref 0–1.2)
BUN SERPL-MCNC: 11 MG/DL (ref 6–23)
CALCIUM SERPL-MCNC: 9.5 MG/DL (ref 8.6–10.3)
CHLORIDE SERPL-SCNC: 96 MMOL/L (ref 98–107)
CHOLEST SERPL-MCNC: 142 MG/DL (ref 0–199)
CHOLESTEROL/HDL RATIO: 2.9
CO2 SERPL-SCNC: 31 MMOL/L (ref 21–32)
CREAT SERPL-MCNC: 0.77 MG/DL (ref 0.5–1.05)
EGFRCR SERPLBLD CKD-EPI 2021: 74 ML/MIN/1.73M*2
EST. AVERAGE GLUCOSE BLD GHB EST-MCNC: 128 MG/DL
GLUCOSE SERPL-MCNC: 147 MG/DL (ref 74–99)
HBA1C MFR BLD: 6.1 %
HDLC SERPL-MCNC: 49.4 MG/DL
LDLC SERPL CALC-MCNC: 64 MG/DL
NON HDL CHOLESTEROL: 93 MG/DL (ref 0–149)
POTASSIUM SERPL-SCNC: 4 MMOL/L (ref 3.5–5.3)
PROT SERPL-MCNC: 7.3 G/DL (ref 6.4–8.2)
SODIUM SERPL-SCNC: 135 MMOL/L (ref 136–145)
T4 FREE SERPL-MCNC: 1.71 NG/DL (ref 0.61–1.12)
TRIGL SERPL-MCNC: 143 MG/DL (ref 0–149)
TSH SERPL-ACNC: 1.52 MIU/L (ref 0.44–3.98)
VLDL: 29 MG/DL (ref 0–40)

## 2024-12-06 PROCEDURE — 90662 IIV NO PRSV INCREASED AG IM: CPT | Performed by: FAMILY MEDICINE

## 2024-12-06 PROCEDURE — 80061 LIPID PANEL: CPT

## 2024-12-06 PROCEDURE — 83036 HEMOGLOBIN GLYCOSYLATED A1C: CPT

## 2024-12-06 PROCEDURE — 1158F ADVNC CARE PLAN TLK DOCD: CPT | Performed by: FAMILY MEDICINE

## 2024-12-06 PROCEDURE — G0008 ADMIN INFLUENZA VIRUS VAC: HCPCS | Performed by: FAMILY MEDICINE

## 2024-12-06 PROCEDURE — 84443 ASSAY THYROID STIM HORMONE: CPT

## 2024-12-06 PROCEDURE — 1160F RVW MEDS BY RX/DR IN RCRD: CPT | Performed by: FAMILY MEDICINE

## 2024-12-06 PROCEDURE — 36415 COLL VENOUS BLD VENIPUNCTURE: CPT

## 2024-12-06 PROCEDURE — 1159F MED LIST DOCD IN RCRD: CPT | Performed by: FAMILY MEDICINE

## 2024-12-06 PROCEDURE — 3074F SYST BP LT 130 MM HG: CPT | Performed by: FAMILY MEDICINE

## 2024-12-06 PROCEDURE — 1123F ACP DISCUSS/DSCN MKR DOCD: CPT | Performed by: FAMILY MEDICINE

## 2024-12-06 PROCEDURE — 84439 ASSAY OF FREE THYROXINE: CPT

## 2024-12-06 PROCEDURE — 3078F DIAST BP <80 MM HG: CPT | Performed by: FAMILY MEDICINE

## 2024-12-06 PROCEDURE — 80053 COMPREHEN METABOLIC PANEL: CPT

## 2024-12-06 PROCEDURE — 99214 OFFICE O/P EST MOD 30 MIN: CPT | Performed by: FAMILY MEDICINE

## 2024-12-06 RX ORDER — ALPRAZOLAM 1 MG/1
1 TABLET ORAL 2 TIMES DAILY PRN
Qty: 60 TABLET | Refills: 0 | Status: SHIPPED | OUTPATIENT
Start: 2024-12-06

## 2024-12-06 ASSESSMENT — ENCOUNTER SYMPTOMS
CONSTIPATION: 1
DIARRHEA: 1
RESTLESSNESS: 0
FATIGUE: 0
POLYPHAGIA: 0
CONFUSION: 0
DIZZINESS: 0
IRRITABILITY: 0
HEADACHES: 0
POLYDIPSIA: 0
DEPRESSED MOOD: 0
WEAKNESS: 0
NERVOUS/ANXIOUS: 0
FLATUS: 0
DECREASED CONCENTRATION: 0
BLOATING: 1
ABDOMINAL PAIN: 0

## 2024-12-06 ASSESSMENT — ANXIETY QUESTIONNAIRES
GAD7 TOTAL SCORE: 0
7. FEELING AFRAID AS IF SOMETHING AWFUL MIGHT HAPPEN: NOT AT ALL
5. BEING SO RESTLESS THAT IT IS HARD TO SIT STILL: NOT AT ALL
4. TROUBLE RELAXING: NOT AT ALL
2. NOT BEING ABLE TO STOP OR CONTROL WORRYING: NOT AT ALL
6. BECOMING EASILY ANNOYED OR IRRITABLE: NOT AT ALL
IF YOU CHECKED OFF ANY PROBLEMS ON THIS QUESTIONNAIRE, HOW DIFFICULT HAVE THESE PROBLEMS MADE IT FOR YOU TO DO YOUR WORK, TAKE CARE OF THINGS AT HOME, OR GET ALONG WITH OTHER PEOPLE: NOT DIFFICULT AT ALL
3. WORRYING TOO MUCH ABOUT DIFFERENT THINGS: NOT AT ALL
1. FEELING NERVOUS, ANXIOUS, OR ON EDGE: NOT AT ALL

## 2024-12-06 NOTE — PROGRESS NOTES
Subjective   Patient ID: Katelyn Weiss is a 89 y.o. female who presents for Follow-up (Diabetes, Hypertension, Hyperlipidemia, Hypothyroidism and Anxiety) and Constipation.    Patient is here for follow-up on hypertension and hyperlipidemia. She has no chest pain, dyspnea, exertional chest pressure/discomfort, near-syncope, orthopnea, palpitations, paroxysmal nocturnal dyspnea, and syncope. Taking her medication regularly with no side effects.    She presents for follow up of hypothyroidism. Current symptoms: none. She has no change in energy level, diarrhea, heat / cold intolerance, nervousness, palpitations, or weight changes.     Anxiety  Presents for follow-up visit. Patient reports no chest pain, confusion, decreased concentration, depressed mood, dizziness, excessive worry, irritability, nervous/anxious behavior, palpitations, restlessness or shortness of breath.       Diabetes  She presents for her follow-up diabetic visit. She has type 2 diabetes mellitus. Her disease course has been stable. Pertinent negatives for hypoglycemia include no confusion, dizziness, headaches, nervousness/anxiousness or tremors. Pertinent negatives for diabetes include no chest pain, no fatigue, no foot paresthesias, no foot ulcerations, no polydipsia, no polyphagia, no polyuria and no weakness. Risk factors for coronary artery disease include diabetes mellitus, hypertension and dyslipidemia.   Constipation  This is a new problem. The current episode started more than 1 month ago. The problem is unchanged. Her stool frequency is 2 to 3 times per week. The stool is described as watery (dark). The patient is not on a high fiber diet. She Does not exercise regularly. There has Been adequate water intake. Associated symptoms include bloating and diarrhea. Pertinent negatives include no abdominal pain, fever, flatus or vomiting. Associated symptoms comments: Intake a lot of starchy food due to meals made by family..        Review of  "Systems   Constitutional:  Negative for chills, fatigue, fever and irritability.   HENT:  Negative for congestion, ear pain, nosebleeds, rhinorrhea and sore throat.    Respiratory:  Negative for cough, shortness of breath and wheezing.    Cardiovascular:  Negative for chest pain, palpitations and leg swelling.   Gastrointestinal:  Positive for bloating, constipation and diarrhea. Negative for abdominal pain, flatus and vomiting.   Endocrine: Negative for polydipsia, polyphagia and polyuria.   Genitourinary:  Negative for dysuria, frequency and hematuria.   Neurological:  Negative for dizziness, tremors, weakness, numbness and headaches.   Psychiatric/Behavioral:  Negative for confusion and decreased concentration. The patient is not nervous/anxious.        Objective   /60   Pulse 75   Temp 36.6 °C (97.8 °F)   Resp 16   Ht 1.651 m (5' 5\")   Wt 60.3 kg (133 lb)   SpO2 98%   BMI 22.13 kg/m²     Physical Exam  Constitutional:       General: She is not in acute distress.     Appearance: Normal appearance.   HENT:      Head: Normocephalic and atraumatic.      Mouth/Throat:      Mouth: Mucous membranes are moist.      Pharynx: Oropharynx is clear. No oropharyngeal exudate or posterior oropharyngeal erythema.   Eyes:      General: No scleral icterus.     Extraocular Movements: Extraocular movements intact.      Pupils: Pupils are equal, round, and reactive to light.   Cardiovascular:      Rate and Rhythm: Normal rate and regular rhythm.      Pulses: Normal pulses.      Heart sounds: No murmur heard.     No friction rub. No gallop.   Pulmonary:      Effort: Pulmonary effort is normal.      Breath sounds: No wheezing, rhonchi or rales.   Skin:     General: Skin is warm.      Coloration: Skin is not jaundiced or pale.      Findings: No erythema or rash.   Neurological:      General: No focal deficit present.      Mental Status: She is alert and oriented to person, place, and time.      Cranial Nerves: No cranial " nerve deficit.      Sensory: No sensory deficit.      Coordination: Coordination normal.      Gait: Gait normal.         Assessment/Plan   Problem List Items Addressed This Visit       Acquired hypothyroidism     Well-controlled, continue current medications and management.         Relevant Orders    Thyroxine, Free    Thyroid Stimulating Hormone    Follow Up In [facility] - PCP - Established    Chronic constipation     We will have her take OTC Colace 100 mg twice a day.          Essential hypertension     Well-controlled, continue current medications and management.         Relevant Orders    Comprehensive Metabolic Panel    Lipid Panel    Hemoglobin A1C    Follow Up In [facility] - PCP - Established    Generalized anxiety disorder     Stable, continue current medications and management.  The risks and benefits of my recommendations, as well as other treatment options were discussed with the patient today.    The side effects of the medications were discussed.  Advised not to take the medication with alcohol .  Exercise regularly and this can give you a sense of well being and help decrease feelings of anxiety.;  Get plenty of sleep. Sleep rests your brain as well as your body, and can improve your general sense of wellbeing as well as your mood.;  Avoid alcohol and drug abuse. It may seem that alcohol or drugs relax you. But in the long run they make anxiety worse and cause more problems.;  Avoid caffeine. Caffeine is found in coffee, tea, soft drinks and chocolate. Caffeine may increase your sense of anxiety because it stimulates your nervous system. Also avoid over-the-counter diet pills, and cough and cold medicines that contain a decongestant.;  Confront the things that have made you anxious in the past. Begin by just picturing yourself confronting these things. By doing this, you can get used to the idea of confronting the things that make you anxious before you actually do it. After  you feel more comfortable picturing yourself confronting these things, you can begin to actually face them.;  If you feel yourself getting anxious, practice a relaxation technique or focus on a simple task, such as counting backward from 100 to 0.;  Although feelings of anxiety are scary, they won't hurt you. Label the level of your fear from 0 to 10 and keep track as it goes up and down. Notice that it doesn't stay at a very high level for more than a few seconds. When the fear comes, accept it. Wait and give it time to pass without running away from it.;  Take medications as prescribed.         Relevant Medications    ALPRAZolam (Xanax) 1 mg tablet    Other Relevant Orders    Follow Up In [facility] - PCP - Established    Mixed hyperlipidemia     The nature of cardiac risk has been fully discussed with this patient. Discussed cardiovascular risk analysis and appropriate diet with the need for lifelong measures to reduce the risk. A regular exercise program is recommended to help achieve and maintain normal body weight, fitness and improve lipid balance. Patient education provided. They understand and agree with this course of treatment. They will return with new or worsening symptoms. Patient instructed to remain current with appropriate annual health maintenance.          Relevant Orders    Comprehensive Metabolic Panel    Lipid Panel    Hemoglobin A1C    Follow Up In [facility] - PCP - Established    Type 2 diabetes mellitus with stage 3a chronic kidney disease, without long-term current use of insulin (Multi) - Primary     Diabetes Mellitus type II, under good control.  1. Rx changes: None  2. Education: Reviewed ‘ABCs’ of diabetes management (respective goals in parentheses):  A1C (<7), blood pressure (<130/80), and cholesterol (LDL <100).  3. Compliance at present is estimated to be good. Efforts to improve compliance (if necessary) will be directed at dietary modifications: and  increased exercise.  4. Follow up: 3 months         Relevant Orders    Comprehensive Metabolic Panel    Lipid Panel    Hemoglobin A1C    Follow Up In [facility] - PCP - Established     Other Visit Diagnoses       Need for influenza vaccination        Relevant Orders    Flu vaccine, trivalent, preservative free, HIGH-DOSE, age 65y+ (Fluzone)          OARRS:  Darius Quezada MD on 12/6/2024 11:33 AM  I have personally reviewed the OARRS report for Katelyn Weiss. I have considered the risks of abuse, dependence, addiction and diversion    Is the patient prescribed a combination of a benzodiazepine and opioid?  No    Last Urine Drug Screen / ordered today: No  Recent Results (from the past 8760 hours)   POCT waived urine drug screen manually resulted    Collection Time: 09/04/24 11:45 AM   Result Value Ref Range    POC THC Negative Negative, Not applicable ng/mL    POC Cocaine Negative Negative, Not applicable ng/mL    POC Opiates Negative Negative, Not applicable ng/mL    POC Amphetamine Negative Negative, Not applicable ng/mL    POC Phencyclidine (PCP) Negative Negative, Not applicable ng/mL    POC Barbiturates Negative Negative, Not applicable ng/mL    POC Benzodiazepines Positive (A) Negative, Not applicable ng/mL    POC Methamphetamine Negative Negative, Not applicable ng/mL    POC METHADONE MANUALLY ENTERED Negative Negative, Not applicable ng/mL    POC Ticyclic Antidepressants (TCA) Negative Negative, Not applicable ng/mL    POC Oxycodone Negative Negative, Not applicable ng/mL    POC MDMA URINE Negative Negative, Not applicable ng/mL    POC Morphine Urine Negative Negative, Not applicable ng/mL    POC Burprenorphine Urine Negative Negative, Not applicable ng/mL     Controlled Substance Agreement:  Date of the Last Agreement: 3/4/2024  Reviewed Controlled Substance Agreement including but not limited to the benefits, risks, and alternatives to treatment with a Controlled Substance  medication(s).    Benzodiazepines:  What is the patient's goal of therapy? Able to function without being unduly anxious and carry out day-to-day activities without tension and panic attacks    Is this being achieved with current treatment? yes    JOSE-7:  Over the last 2 weeks, how often have you been bothered by any of the following problems?  Feeling nervous, anxious, or on edge: 0  Not being able to stop or control worryin  Worrying too much about different things: 0  Trouble relaxin  Being so restless that it is hard to sit still: 0  Becoming easily annoyed or irritable: 0  Feeling afraid as if something awful might happen: 0  JOSE-7 Total Score: 0    Activities of Daily Living:   Is your overall impression that this patient is benefiting (symptom reduction outweighs side effects) from benzodiazepine therapy? Yes     1. Physical Functioning: Better  2. Family Relationship: Better  3. Social Relationship: Better  4. Mood: Better  5. Sleep Patterns: Better  6. Overall Function: Better    Scribe Attestation  By signing my name below, I, Meghana Ricketts   attest that this documentation has been prepared under the direction and in the presence of Darius Quezada MD.

## 2024-12-07 ASSESSMENT — ENCOUNTER SYMPTOMS
RHINORRHEA: 0
TREMORS: 0
NUMBNESS: 0
WHEEZING: 0
SORE THROAT: 0
COUGH: 0
CHILLS: 0
HEMATURIA: 0
PALPITATIONS: 0
DYSURIA: 0
VOMITING: 0
FREQUENCY: 0
SHORTNESS OF BREATH: 0
FEVER: 0

## 2024-12-09 DIAGNOSIS — I10 ESSENTIAL HYPERTENSION: ICD-10-CM

## 2024-12-09 DIAGNOSIS — E11.22 TYPE 2 DIABETES MELLITUS WITH STAGE 3A CHRONIC KIDNEY DISEASE, WITHOUT LONG-TERM CURRENT USE OF INSULIN (MULTI): ICD-10-CM

## 2024-12-09 DIAGNOSIS — N18.31 TYPE 2 DIABETES MELLITUS WITH STAGE 3A CHRONIC KIDNEY DISEASE, WITHOUT LONG-TERM CURRENT USE OF INSULIN (MULTI): ICD-10-CM

## 2024-12-09 DIAGNOSIS — E78.2 MIXED HYPERLIPIDEMIA: ICD-10-CM

## 2024-12-19 ENCOUNTER — TELEPHONE (OUTPATIENT)
Dept: PRIMARY CARE | Facility: CLINIC | Age: 89
End: 2024-12-19
Payer: MEDICARE

## 2024-12-19 DIAGNOSIS — J20.9 ACUTE BRONCHITIS, UNSPECIFIED ORGANISM: Primary | ICD-10-CM

## 2024-12-19 DIAGNOSIS — J06.9 VIRAL UPPER RESPIRATORY TRACT INFECTION: ICD-10-CM

## 2024-12-19 DIAGNOSIS — R05.9 COUGH IN ADULT: ICD-10-CM

## 2024-12-19 RX ORDER — BENZONATATE 200 MG/1
200 CAPSULE ORAL 3 TIMES DAILY PRN
Qty: 30 CAPSULE | Refills: 0 | Status: SHIPPED | OUTPATIENT
Start: 2024-12-19

## 2024-12-19 RX ORDER — AZITHROMYCIN 250 MG/1
TABLET, FILM COATED ORAL
Qty: 6 TABLET | Refills: 0 | Status: SHIPPED | OUTPATIENT
Start: 2024-12-19 | End: 2024-12-24

## 2024-12-19 NOTE — TELEPHONE ENCOUNTER
Patient called in stating she has been coughing up green phlegm for several days and believes she has the flu. She is wondering if Dr. Quezada could prescribe something to help?   [facility]  Please Advise

## 2025-01-23 ENCOUNTER — APPOINTMENT (OUTPATIENT)
Dept: RADIOLOGY | Facility: HOSPITAL | Age: 89
End: 2025-01-23
Payer: MEDICARE

## 2025-01-23 ENCOUNTER — HOSPITAL ENCOUNTER (EMERGENCY)
Facility: HOSPITAL | Age: 89
Discharge: HOME | End: 2025-01-23
Payer: MEDICARE

## 2025-01-23 VITALS
SYSTOLIC BLOOD PRESSURE: 148 MMHG | HEART RATE: 70 BPM | OXYGEN SATURATION: 98 % | DIASTOLIC BLOOD PRESSURE: 61 MMHG | BODY MASS INDEX: 20.49 KG/M2 | HEIGHT: 64 IN | WEIGHT: 120 LBS | RESPIRATION RATE: 18 BRPM | TEMPERATURE: 97.7 F

## 2025-01-23 DIAGNOSIS — S93.402A SPRAIN OF LEFT ANKLE, UNSPECIFIED LIGAMENT, INITIAL ENCOUNTER: Primary | ICD-10-CM

## 2025-01-23 PROCEDURE — 73630 X-RAY EXAM OF FOOT: CPT | Mod: LEFT SIDE | Performed by: RADIOLOGY

## 2025-01-23 PROCEDURE — 73610 X-RAY EXAM OF ANKLE: CPT | Mod: LT

## 2025-01-23 PROCEDURE — 99284 EMERGENCY DEPT VISIT MOD MDM: CPT

## 2025-01-23 PROCEDURE — 73630 X-RAY EXAM OF FOOT: CPT | Mod: LT

## 2025-01-23 PROCEDURE — 73610 X-RAY EXAM OF ANKLE: CPT | Mod: LEFT SIDE | Performed by: RADIOLOGY

## 2025-01-23 RX ORDER — ACETAMINOPHEN 500 MG
1000 TABLET ORAL EVERY 6 HOURS PRN
Qty: 30 TABLET | Refills: 0 | Status: SHIPPED | OUTPATIENT
Start: 2025-01-23 | End: 2025-02-02

## 2025-01-23 ASSESSMENT — PAIN - FUNCTIONAL ASSESSMENT: PAIN_FUNCTIONAL_ASSESSMENT: 0-10

## 2025-01-23 ASSESSMENT — LIFESTYLE VARIABLES
EVER HAD A DRINK FIRST THING IN THE MORNING TO STEADY YOUR NERVES TO GET RID OF A HANGOVER: NO
TOTAL SCORE: 0
HAVE PEOPLE ANNOYED YOU BY CRITICIZING YOUR DRINKING: NO
EVER FELT BAD OR GUILTY ABOUT YOUR DRINKING: NO
HAVE YOU EVER FELT YOU SHOULD CUT DOWN ON YOUR DRINKING: NO

## 2025-01-23 ASSESSMENT — COLUMBIA-SUICIDE SEVERITY RATING SCALE - C-SSRS
1. IN THE PAST MONTH, HAVE YOU WISHED YOU WERE DEAD OR WISHED YOU COULD GO TO SLEEP AND NOT WAKE UP?: NO
2. HAVE YOU ACTUALLY HAD ANY THOUGHTS OF KILLING YOURSELF?: NO
6. HAVE YOU EVER DONE ANYTHING, STARTED TO DO ANYTHING, OR PREPARED TO DO ANYTHING TO END YOUR LIFE?: NO

## 2025-01-23 ASSESSMENT — PAIN SCALES - GENERAL
PAINLEVEL_OUTOF10: 8
PAINLEVEL_OUTOF10: 0 - NO PAIN

## 2025-01-23 NOTE — ED PROVIDER NOTES
"HPI   Chief Complaint   Patient presents with    Foot Injury     \"I injured left foot yesterday.\"       A 89-year-old female patient comes into the emergency department today with complaints of a left foot and ankle pain.  States that she accidentally rolled her foot causing her to fall.  Denies any her head or loss consciousness.  Denies pain anywhere else.  States he only has pain in the foot and ankle when she tries to bear weight which she rates an 8 out of 10 on the pain scale otherwise her pain is a 0 out of 10 on the pain scale.  For this purpose family brought her into the emergency department for further evaluation.              Patient History   Past Medical History:   Diagnosis Date    COPD (chronic obstructive pulmonary disease) (Multi)     Diabetes mellitus (Multi)      History reviewed. No pertinent surgical history.  No family history on file.  Social History     Tobacco Use    Smoking status: Former     Types: Cigarettes    Smokeless tobacco: Never   Vaping Use    Vaping status: Never Used   Substance Use Topics    Alcohol use: Never    Drug use: Never       Physical Exam   ED Triage Vitals [01/23/25 1555]   Temperature Heart Rate Respirations BP   36.5 °C (97.7 °F) 66 20 146/67      Pulse Ox Temp Source Heart Rate Source Patient Position   98 % Temporal Monitor Sitting      BP Location FiO2 (%)     Right arm --       Physical Exam      ED Course & MDM   Diagnoses as of 01/23/25 1706   Sprain of left ankle, unspecified ligament, initial encounter                 No data recorded                                 Medical Decision Making  A 89-year-old female patient comes into the emergency department today with complaints of a left foot and ankle pain.  States that she accidentally rolled her foot causing her to fall.  Denies any her head or loss consciousness.  Denies pain anywhere else.  States he only has pain in the foot and ankle when she tries to bear weight which she rates an 8 out of 10 on the " pain scale otherwise her pain is a 0 out of 10 on the pain scale.  For this purpose family brought her into the emergency department for further evaluation.    X-ray of the left foot and ankle ordered to rule out any acute fracture.    Patient has negative radiologic findings here in the emergency department today.  Will have patient's foot wrapped with Ace and have patient follow-up with orthopedics for further evaluation.  Family and patient agree with this plan expressed verbal understanding.  All questions were answered.    The correct placement of the splint/strap was confirmed.  Any necessary adjustments were made.  The patient´s neurovascular status is intact pre and post placement.      I personally supervised and inspected the splint/strap which was applied by nurse      . The extremity is appropriately immobilized. Patient neurovascularly intact before and after the splint application.    Historian is the patient    Diagnosis: Left ankle sprain    Labs Reviewed - No data to display     XR foot left 3+ views   Final Result   No acute fracture identified.             MACRO:   None        Signed by: Beto Gao 1/23/2025 4:35 PM   Dictation workstation:   [id number]      XR ankle left 3+ views   Final Result   No acute fracture identified.             MACRO:   None        Signed by: Beto Gao 1/23/2025 4:35 PM   Dictation workstation:   [id number]            Procedure  Procedures     Antoni Joya PA-C  01/23/25 7606

## 2025-01-24 ENCOUNTER — OFFICE VISIT (OUTPATIENT)
Dept: ORTHOPEDIC SURGERY | Facility: CLINIC | Age: OVER 89
End: 2025-01-24
Payer: MEDICARE

## 2025-01-24 DIAGNOSIS — S93.402A SPRAIN OF LEFT ANKLE, INITIAL ENCOUNTER: ICD-10-CM

## 2025-01-24 DIAGNOSIS — S90.32XA CONTUSION OF LEFT FOOT, INITIAL ENCOUNTER: Primary | ICD-10-CM

## 2025-01-24 DIAGNOSIS — S90.02XA CONTUSION OF LEFT ANKLE, INITIAL ENCOUNTER: ICD-10-CM

## 2025-01-24 PROCEDURE — 99214 OFFICE O/P EST MOD 30 MIN: CPT | Performed by: FAMILY MEDICINE

## 2025-01-24 RX ORDER — TRAMADOL HYDROCHLORIDE 50 MG/1
50 TABLET ORAL EVERY 12 HOURS PRN
Qty: 14 TABLET | Refills: 0 | Status: SHIPPED | OUTPATIENT
Start: 2025-01-24 | End: 2025-01-31

## 2025-01-24 NOTE — PROGRESS NOTES
Acute Injury New Patient Visit    CC:   Chief Complaint   Patient presents with    Left Foot - Pain       HPI: Katelyn is a 89 y.o.female who presents today with new complaints of acute pain discomfort to the left foot and ankle.  2 days ago she had injured herself standing and walking around she did not feel an obvious pop however had significant bruising and swelling about the foot and ankle.  She presents here today with family member for further evaluation.  She has a history of prior surgery to the tendons of the foot affecting her toes but she states no previous pain recently and no significant swelling up until the other day.  She points across the top of the foot lateral aspect of the foot and lateral ankle as area of most pain and discomfort.        Review of Systems   GENERAL: Negative for malaise, significant weight loss, fever  MUSCULOSKELETAL: See HPI  NEURO: Negative for numbness / tingling     Past Medical History  Past Medical History:   Diagnosis Date    Acute sinusitis, unspecified 04/24/2019    Acute non-recurrent sinusitis, unspecified location    Encounter for general adult medical examination without abnormal findings 02/19/2020    Routine general medical examination at health care facility    Personal history of other endocrine, nutritional and metabolic disease     History of thyroid disorder       Medication review  Medication Documentation Review Audit       Reviewed by Cole C Budinsky, MD (Physician) on 01/24/25 at 1735      Medication Order Taking? Sig Documenting Provider Last Dose Status   albuterol 90 mcg/actuation inhaler [id number] No INHALE 1 TO 2 PUFFS EVERY 4 TO 6 HOURS AS NEEDED. Darius Quezada MD Taking Active   ALPRAZolam (Xanax) 1 mg tablet [id number]  Take 1 tablet (1 mg) by mouth 2 times a day as needed for anxiety (for 30 days). Daruis Quezada MD  Active   aspirin 81 mg chewable tablet [id number]  Chew. Historical Provider, MD  Active   atorvastatin (Lipitor) 20 mg tablet  [id number] No Take 1 tablet (20 mg) by mouth once daily. Darius Quezada MD Taking Active   benzonatate (Tessalon) 200 mg capsule [id number]  Take 1 capsule (200 mg) by mouth 3 times a day as needed for cough. Do not crush or chew. Darius Quezada MD  Active   bimatoprost (Lumigan) 0.03 % ophthalmic solution [id number] No Administer into affected eye(s). Historical Provider, MD Taking Active   cyanocobalamin (Vitamin B-12) 1,000 mcg tablet [id number] No Take 1 tablet (1,000 mcg) by mouth once daily. Darius Quezada MD Taking Active   cyanocobalamin (Vitamin B-12) 1,000 mcg/mL injection [id number] No 1 mL (1,000 mcg) every 14 (fourteen) days. Inject   Patient not taking: Reported on 12/6/2024    Historical Provider, MD Taking Active   docusate sodium (Colace) 100 mg capsule [id number] No Take 1 capsule (100 mg) by mouth 2 times a day.   Patient not taking: Reported on 12/6/2024    Historical Provider, MD Taking Active   fluticasone (Flonase) 50 mcg/actuation nasal spray [id number] No Administer 2 sprays into each nostril once daily. SHAKE LIQUID Historical Provider, MD Taking Active   lancets 33 gauge misc [id number] No  Historical Provider, MD Taking Active   latanoprost (Xalatan) 0.005 % ophthalmic solution [id number] No INSTILL 1 DROP BOTH EYES AT BEDTIME Historical Provider, MD Taking Active   levothyroxine (Synthroid, Levoxyl) 50 mcg tablet [id number] No Take 1 tablet (50 mcg) by mouth once daily in the morning. Take before meals. Darius Quezada MD Taking Active   metFORMIN  mg 24 hr tablet [id number] No Take 1 tablet (500 mg) by mouth once daily in the evening. Take with meals. Darius Quezada MD Taking Active   NIFEdipine ER (Adalat CC) 30 mg 24 hr tablet [id number]  Take 1 tablet (30 mg) by mouth once daily. Darius Quezada MD  Active   nitroglycerin (Nitrostat) 0.4 mg SL tablet [id number] No Place 1 tablet (0.4 mg) under the tongue every 5 minutes if needed for chest pain. Darius Quezada MD Taking Active   OneTouch  Ultra Test strip [id number] No TEST 3 TIMES DAILY.TEST 3 TIMES DAILY. Darius Quezada MD Taking Active   pantoprazole (ProtoNix) 40 mg EC tablet [id number] No Take 1 tablet (40 mg) by mouth once daily. Darius Quezada MD Taking Active   timolol (Timoptic) 0.5 % ophthalmic solution [id number] No Administer 1 drop into both eyes once daily. Historical Provider, MD Taking Active   traMADol (Ultram) 50 mg tablet [id number]  Take 1 tablet (50 mg) by mouth every 12 hours if needed for severe pain (7 - 10) for up to 7 days. Cole C Budinsky, MD  Active                    Allergies  Allergies   Allergen Reactions    Bee Venom Protein (Honey Bee) Anaphylaxis    Losartan Potassium Unknown    Sulfa (Sulfonamide Antibiotics) Unknown    Sulfamethoxazole Unknown       Social History  Social History     Socioeconomic History    Marital status:      Spouse name: Not on file    Number of children: Not on file    Years of education: Not on file    Highest education level: Not on file   Occupational History    Not on file   Tobacco Use    Smoking status: Former     Types: Cigarettes    Smokeless tobacco: Never   Vaping Use    Vaping status: Never Used   Substance and Sexual Activity    Alcohol use: Never    Drug use: Never    Sexual activity: Defer   Other Topics Concern    Not on file   Social History Narrative    Not on file     Social Drivers of Health     Financial Resource Strain: Not on file   Food Insecurity: Not on file   Transportation Needs: Not on file   Physical Activity: Not on file   Stress: Not on file   Social Connections: Not on file   Intimate Partner Violence: Not on file   Housing Stability: Not on file       Surgical History  Past Surgical History:   Procedure Laterality Date    MR CHEST ANGIO W IV CONTRAST  12/5/2014    MR CHEST ANGIO W IV CONTRAST 12/5/2014 [facility] [place] ANCILLARY LEGACY    OTHER SURGICAL HISTORY  02/27/2019    Umbilical hernia repair       Physical Exam:  GENERAL:  Patient is awake, alert, and  oriented to person place and time.  Patient appears well nourished and well kept.  Affect Calm, Not Acutely Distressed.  HEENT:  Normocephalic, Atraumatic, EOMI  CARDIOVASCULAR:  Hemodynamically stable.  RESPIRATORY:  Normal respirations with unlabored breathing.  NEURO: Gross sensation intact to the lower extremities bilaterally.  Extremity: Left ankle exam: The affected ankle was examined and inspected.  There was evidence of lateral sided soft tissue swelling and fullness with mild bruising noted.  The distal fibula had mild tenderness to palpation, the distal medial malleolus was non-tender.  Tenderness across the anterior joint space and over the soft tissues in the area of the ATFL and CFL ligaments.  Negative Heel Tap and Calcaneal Squeeze, Achilles is non-tender.  Negative Reji´s and Lopez sign.  Positive midfoot and distal metatarsal squeeze.  Tenderness over the base the fifth metatarsal.  Distal pulses and sensation are intact with good distal cap refill.  Active and passive ROM and strength about the ankle is limited with Dorsiflexion, Plantarflexion, Eversion, and Inversion. Unable to tolerate full weight bearing secondary to pain.      Diagnostics: X-rays today demonstrate no obvious presence for acute fracture or dislocation, osteoporotic bone with swelling about the foot and ankle.        Procedure: None  Procedures    Assessment:   Problem List Items Addressed This Visit    None  Visit Diagnoses       Contusion of left foot, initial encounter    -  Primary    Relevant Medications    traMADol (Ultram) 50 mg tablet    Other Relevant Orders    Walking Boot Tall    Contusion of left ankle, initial encounter        Relevant Medications    traMADol (Ultram) 50 mg tablet    Other Relevant Orders    Walking Boot Tall    Sprain of left ankle, initial encounter        Relevant Medications    traMADol (Ultram) 50 mg tablet    Other Relevant Orders    Walking Boot Tall             Plan: At this time  discussed with the patient and the family member at the bedside we can offer her a walking boot for comfort compression and support she may weight-bear as tolerated.  Additionally recommended ice and elevation.  She can take Tylenol and ibuprofen if needed for pain control will also offer her short course of pain medication due to her significant discomfort.  Repeat x-rays 3 views of left foot and ankle at follow-up.  She is to call or return sooner with issues in the interim.  Orders Placed This Encounter    Walking Boot Tall    traMADol (Ultram) 50 mg tablet      At the conclusion of the visit there were no further questions by the patient/family regarding their plan of care.  Patient was instructed to call or return with any issues, questions, or concerns regarding their injury and/or treatment plan described above.     01/24/25 at 5:35 PM - Cole C Budinsky, MD    Office: [phone]    This note was prepared using voice recognition software.  The details of this note are correct and have been reviewed, and corrected to the best of my ability.  Some grammatical errors may persist related to the Dragon software.

## 2025-02-14 ENCOUNTER — OFFICE VISIT (OUTPATIENT)
Dept: ORTHOPEDIC SURGERY | Facility: CLINIC | Age: OVER 89
End: 2025-02-14
Payer: MEDICARE

## 2025-02-14 ENCOUNTER — HOSPITAL ENCOUNTER (OUTPATIENT)
Dept: RADIOLOGY | Facility: CLINIC | Age: OVER 89
Discharge: HOME | End: 2025-02-14
Payer: MEDICARE

## 2025-02-14 DIAGNOSIS — S93.402A SPRAIN OF LEFT ANKLE, INITIAL ENCOUNTER: ICD-10-CM

## 2025-02-14 DIAGNOSIS — S90.32XA CONTUSION OF LEFT FOOT, INITIAL ENCOUNTER: ICD-10-CM

## 2025-02-14 DIAGNOSIS — S90.02XA CONTUSION OF LEFT ANKLE, INITIAL ENCOUNTER: ICD-10-CM

## 2025-02-14 DIAGNOSIS — E78.2 MIXED HYPERLIPIDEMIA: ICD-10-CM

## 2025-02-14 DIAGNOSIS — S92.352A CLOSED FRACTURE OF BASE OF FIFTH METATARSAL BONE OF LEFT FOOT, INITIAL ENCOUNTER: Primary | ICD-10-CM

## 2025-02-14 PROCEDURE — L3260 AMBULATORY SURGICAL BOOT EAC: HCPCS | Performed by: FAMILY MEDICINE

## 2025-02-14 PROCEDURE — 99214 OFFICE O/P EST MOD 30 MIN: CPT | Performed by: FAMILY MEDICINE

## 2025-02-14 PROCEDURE — 73610 X-RAY EXAM OF ANKLE: CPT | Mod: LT

## 2025-02-14 PROCEDURE — 73630 X-RAY EXAM OF FOOT: CPT | Mod: LT

## 2025-02-14 RX ORDER — VIT C/E/ZN/COPPR/LUTEIN/ZEAXAN 250MG-90MG
5000 CAPSULE ORAL DAILY
Qty: 90 CAPSULE | Refills: 0 | Status: SHIPPED | OUTPATIENT
Start: 2025-02-14 | End: 2025-05-15

## 2025-02-14 RX ORDER — ATORVASTATIN CALCIUM 20 MG/1
20 TABLET, FILM COATED ORAL DAILY
Qty: 90 TABLET | Refills: 2 | Status: SHIPPED | OUTPATIENT
Start: 2025-02-14

## 2025-02-14 NOTE — PROGRESS NOTES
Established Patient Follow-Up Visit    CC:   Chief Complaint   Patient presents with    Left Foot - Follow-up     Contusion of foot  Xray today    Left Ankle - Follow-up     Contusion on ankle  Xray today       HPI:  Katelyn is a 89 y.o. female returns here today for follow-up visit regarding: Left foot pain.  She states he is unable to tolerate the walking boot she actually feels that there is worsening pain to the lateral side of the foot here today.  No new injury or trauma since last visit.  Returns here today for 3-week follow-up.          REVIEW OF SYSTEMS:  GENERAL: Negative for malaise, significant weight loss, fever  MUSCULOSKELETAL: See HPI  NEURO: Negative for numbness / tingling       PHYSICAL EXAM:  -Neuro: Gross sensation intact to the lower extremities bilaterally.  -Extremity: Left foot exam demonstrates tenderness palpation over the base of the fifth metatarsal no pain really elsewhere no anterior ankle medial or lateral malleoli pain.  Distal metatarsals are nontender ambulates with antalgic gait.    IMAGING: Repeat x-rays today demonstrate interval presence of nondisplaced left base the fifth metatarsal fracture.      PROCEDURE: None  Procedures     ASSESSMENT:   Follow-up visit for:  Problem List Items Addressed This Visit    None  Visit Diagnoses       Closed fracture of base of fifth metatarsal bone of left foot, initial encounter    -  Primary    Contusion of left foot, initial encounter        Relevant Medications    cholecalciferol, vitamin D3, (Vitamin D-3) 125 mcg (5000 UT) capsule    Other Relevant Orders    XR foot left 3+ views    Post-op shoe    Contusion of left ankle, initial encounter        Relevant Medications    cholecalciferol, vitamin D3, (Vitamin D-3) 125 mcg (5000 UT) capsule    Other Relevant Orders    XR ankle left 3+ views    Post-op shoe    Sprain of left ankle, initial encounter        Relevant Medications    cholecalciferol, vitamin D3, (Vitamin D-3) 125 mcg (5000 UT)  capsule    Other Relevant Orders    XR ankle left 3+ views    Post-op shoe             PLAN: Discussed with patient concern for presentation of the base of the fifth metatarsal fracture.  This is nonoperatively treatable which he is agreeable to proceed forward with.  Will plan to see her back in 3 to 4 weeks for repeat evaluation she may weight-bear as tolerated with the postop shoe.  Recommended she utilize vitamin D supplementation which was provided for her today.  Will defer fracture care billing for now, we will continue with established level care visits going forward.  Repeat x-rays 3 views of the left foot at follow-up.  Call or return sooner with any issues in the interim.  Orders Placed This Encounter    Post-op shoe    XR foot left 3+ views    XR ankle left 3+ views    cholecalciferol, vitamin D3, (Vitamin D-3) 125 mcg (5000 UT) capsule           At the conclusion of the visit there were no further questions by the patient/family regarding their plan of care.  Patient was instructed to call or return with any issues, questions, or concerns regarding their injury and/or treatment plan described above.     02/14/25 at 12:25 PM - Cole C Budinsky, MD    Office: [phone]    This note was prepared using voice recognition software.  The details of this note are correct and have been reviewed, and corrected to the best of my ability.  Some grammatical errors may persist related to the Dragon software.

## 2025-02-14 NOTE — TELEPHONE ENCOUNTER
Rx Refill Request     Name: Katelyn Weiss  :  1935   Medication Name:  atorvastatin (Lipitor) 20 mg table   [id number]   Date of last appointment:  2024   Date of next appointment:  3/12/2025   Best number to reach patient:  [phone]

## 2025-02-17 DIAGNOSIS — E11.22 TYPE 2 DIABETES MELLITUS WITH STAGE 3A CHRONIC KIDNEY DISEASE, WITHOUT LONG-TERM CURRENT USE OF INSULIN (MULTI): ICD-10-CM

## 2025-02-17 DIAGNOSIS — E03.9 ACQUIRED HYPOTHYROIDISM: ICD-10-CM

## 2025-02-17 DIAGNOSIS — N18.31 TYPE 2 DIABETES MELLITUS WITH STAGE 3A CHRONIC KIDNEY DISEASE, WITHOUT LONG-TERM CURRENT USE OF INSULIN (MULTI): ICD-10-CM

## 2025-02-17 RX ORDER — METFORMIN HYDROCHLORIDE 500 MG/1
500 TABLET, EXTENDED RELEASE ORAL
Qty: 90 TABLET | Refills: 1 | Status: SHIPPED | OUTPATIENT
Start: 2025-02-17

## 2025-02-17 RX ORDER — LEVOTHYROXINE SODIUM 50 UG/1
TABLET ORAL
Qty: 90 TABLET | Refills: 1 | Status: SHIPPED | OUTPATIENT
Start: 2025-02-17

## 2025-02-17 NOTE — TELEPHONE ENCOUNTER
Rx Refill Request     Name: Katelyn Weiss  :  1935     Date of last appointment:  2024   Date of next appointment:  3/12/2025   Best number to reach patient:  [phone]

## 2025-02-17 NOTE — TELEPHONE ENCOUNTER
Rx Refill Request     Name: Katelyn Weiss  :  1935   Medication Name:  metFORMIN  mg 24 hr tablet   Specific Pharmacy location:  [facility]  Date of last appointment:  2024   Date of next appointment:  3/12/2025   Best number to reach patient:  [phone]

## 2025-03-12 ENCOUNTER — APPOINTMENT (OUTPATIENT)
Dept: PRIMARY CARE | Facility: CLINIC | Age: OVER 89
End: 2025-03-12
Payer: MEDICARE

## 2025-03-12 VITALS
RESPIRATION RATE: 14 BRPM | BODY MASS INDEX: 20.66 KG/M2 | TEMPERATURE: 97.2 F | HEIGHT: 65 IN | OXYGEN SATURATION: 97 % | HEART RATE: 94 BPM | SYSTOLIC BLOOD PRESSURE: 116 MMHG | WEIGHT: 124 LBS | DIASTOLIC BLOOD PRESSURE: 64 MMHG

## 2025-03-12 DIAGNOSIS — N18.31 CHRONIC KIDNEY DISEASE, STAGE 3A (MULTI): ICD-10-CM

## 2025-03-12 DIAGNOSIS — N18.31 TYPE 2 DIABETES MELLITUS WITH STAGE 3A CHRONIC KIDNEY DISEASE, WITHOUT LONG-TERM CURRENT USE OF INSULIN (MULTI): ICD-10-CM

## 2025-03-12 DIAGNOSIS — I10 ESSENTIAL HYPERTENSION: ICD-10-CM

## 2025-03-12 DIAGNOSIS — Z00.00 ROUTINE GENERAL MEDICAL EXAMINATION AT HEALTH CARE FACILITY: Primary | ICD-10-CM

## 2025-03-12 DIAGNOSIS — E03.9 ACQUIRED HYPOTHYROIDISM: ICD-10-CM

## 2025-03-12 DIAGNOSIS — E78.2 MIXED HYPERLIPIDEMIA: ICD-10-CM

## 2025-03-12 DIAGNOSIS — K21.9 GASTROESOPHAGEAL REFLUX DISEASE WITHOUT ESOPHAGITIS: ICD-10-CM

## 2025-03-12 DIAGNOSIS — J44.89 COPD WITH CHRONIC BRONCHITIS (MULTI): ICD-10-CM

## 2025-03-12 DIAGNOSIS — F41.1 GENERALIZED ANXIETY DISORDER: ICD-10-CM

## 2025-03-12 DIAGNOSIS — E11.22 TYPE 2 DIABETES MELLITUS WITH STAGE 3A CHRONIC KIDNEY DISEASE, WITHOUT LONG-TERM CURRENT USE OF INSULIN (MULTI): ICD-10-CM

## 2025-03-12 PROCEDURE — 1036F TOBACCO NON-USER: CPT | Performed by: FAMILY MEDICINE

## 2025-03-12 PROCEDURE — 1159F MED LIST DOCD IN RCRD: CPT | Performed by: FAMILY MEDICINE

## 2025-03-12 PROCEDURE — 3074F SYST BP LT 130 MM HG: CPT | Performed by: FAMILY MEDICINE

## 2025-03-12 PROCEDURE — 1170F FXNL STATUS ASSESSED: CPT | Performed by: FAMILY MEDICINE

## 2025-03-12 PROCEDURE — G0439 PPPS, SUBSEQ VISIT: HCPCS | Performed by: FAMILY MEDICINE

## 2025-03-12 PROCEDURE — 99214 OFFICE O/P EST MOD 30 MIN: CPT | Performed by: FAMILY MEDICINE

## 2025-03-12 PROCEDURE — 1160F RVW MEDS BY RX/DR IN RCRD: CPT | Performed by: FAMILY MEDICINE

## 2025-03-12 PROCEDURE — 1123F ACP DISCUSS/DSCN MKR DOCD: CPT | Performed by: FAMILY MEDICINE

## 2025-03-12 PROCEDURE — 3078F DIAST BP <80 MM HG: CPT | Performed by: FAMILY MEDICINE

## 2025-03-12 RX ORDER — ALPRAZOLAM 1 MG/1
1 TABLET ORAL 2 TIMES DAILY PRN
Qty: 60 TABLET | Refills: 0 | Status: SHIPPED | OUTPATIENT
Start: 2025-03-12

## 2025-03-12 RX ORDER — PANTOPRAZOLE SODIUM 40 MG/1
40 TABLET, DELAYED RELEASE ORAL DAILY
Qty: 90 TABLET | Refills: 1 | Status: SHIPPED | OUTPATIENT
Start: 2025-03-12

## 2025-03-12 ASSESSMENT — ENCOUNTER SYMPTOMS
POLYDIPSIA: 0
HYPERVENTILATION: 0
SORE THROAT: 0
HEADACHES: 0
FREQUENCY: 0
HEMATURIA: 0
CHILLS: 0
COUGH: 0
WEAKNESS: 0
WEIGHT LOSS: 0
LIGHT-HEADEDNESS: 0
THOUGHT CONTENT - OBSESSIONS: 0
FEELING OF CHOKING: 0
BLURRED VISION: 0
RESTLESSNESS: 0
RHINORRHEA: 0
PALPITATIONS: 0
DEPRESSION: 0
DIARRHEA: 0
IRRITABILITY: 0
FEVER: 0
PANIC: 0
WHEEZING: 0
ABDOMINAL PAIN: 0
DIZZINESS: 0
COMPULSIONS: 0
MUSCLE TENSION: 0
FATIGUE: 0
CONSTIPATION: 0
SHORTNESS OF BREATH: 0
OCCASIONAL FEELINGS OF UNSTEADINESS: 1
VOMITING: 0
MALAISE: 0
LOSS OF SENSATION IN FEET: 1
NUMBNESS: 0
BRUISES/BLEEDS EASILY: 0
TREMORS: 0
ADENOPATHY: 0
VISUAL CHANGE: 0
DEPRESSED MOOD: 0
DYSURIA: 0
DECREASED CONCENTRATION: 0
INSOMNIA: 1
NAUSEA: 0
POLYPHAGIA: 0

## 2025-03-12 ASSESSMENT — ANXIETY QUESTIONNAIRES
2. NOT BEING ABLE TO STOP OR CONTROL WORRYING: NOT AT ALL
1. FEELING NERVOUS, ANXIOUS, OR ON EDGE: NOT AT ALL
IF YOU CHECKED OFF ANY PROBLEMS ON THIS QUESTIONNAIRE, HOW DIFFICULT HAVE THESE PROBLEMS MADE IT FOR YOU TO DO YOUR WORK, TAKE CARE OF THINGS AT HOME, OR GET ALONG WITH OTHER PEOPLE: NOT DIFFICULT AT ALL
7. FEELING AFRAID AS IF SOMETHING AWFUL MIGHT HAPPEN: NOT AT ALL
GAD7 TOTAL SCORE: 0
3. WORRYING TOO MUCH ABOUT DIFFERENT THINGS: NOT AT ALL
5. BEING SO RESTLESS THAT IT IS HARD TO SIT STILL: NOT AT ALL
6. BECOMING EASILY ANNOYED OR IRRITABLE: NOT AT ALL
4. TROUBLE RELAXING: NOT AT ALL

## 2025-03-12 ASSESSMENT — ACTIVITIES OF DAILY LIVING (ADL)
TAKING_MEDICATION: INDEPENDENT
DRESSING: INDEPENDENT
MANAGING_FINANCES: INDEPENDENT
BATHING: INDEPENDENT
DOING_HOUSEWORK: INDEPENDENT
GROCERY_SHOPPING: NEEDS ASSISTANCE

## 2025-03-12 NOTE — PROGRESS NOTES
[facility]  Critical Care H&P Note      Patient: Sarika Foster Date of Service: 3/10/2021   MRN: [id number] Time: 1445   YOB: 1976  Length of Stay: 0 days     HPI:     Sarika Potter \"Abbey\" Johnathon Foster is a 45 year old female with a PMH of severe aortic stenosis, hypertension, and current everyday smoker. Per chart review, she was told that she had a murmur 10 years ago. Recently, she has been having worsening shortness of breath on exertion, which improves at rest. With this shortness of breath, she denies any other associated symptoms. No chest pain, nausea, or vomiting. Recent echocardiogram, had revealed an EF of 60% and moderate/severe aortic valve stenosis with a MG of 39 with potential bicuspid morphology. Left heart catheterization showed no evidence of significant multivessel disease. Cardiac surgery consultation was obtained, for which surgical aortic valve replacement was recommended. She now presents to the CCU s/p AVR with a 21 mm [place] mechanical prosthesis. Intraop: EBL 1200mls, cellsaver 900mls, u/o 1225mls. Patient currently on norepinephrine, insulin, txa, ketamine, and dexmedtomidine infusions. As long as chest tube output remains minimal and patient is hemodynamically stable, we will wean to extubate.    Review of Systems:  Constitutional: Negative except as documented in history of present illness.  Eye: Negative except as documented in history of present illness.  Ear/Nose/Mouth/Throat: Negative except as documented in history of present illness.  Cardiovascular: Negative except as documented in history of present illness.  Respiratory: Negative except as documented in history of present illness.  Gastrointestinal: Negative except as documented in history of present illness.  Genitourinary: Negative except as documented in history of present illness.  Musculoskeletal: Negative except as documented in history of present  Subjective   Patient ID: Katelyn Weiss is a 89 y.o. female who presents for Medicare Annual Wellness Visit Subsequent and Follow-up (Diabetes, Hypertension, Hyperlipidemia, Hypothyroidism, and Anxiety).    She presents for Annual Medicare Wellness Visit and follow-up on hypertension and hyperlipidemia. She has no chest pain, dyspnea, exertional chest pressure/discomfort, near-syncope, orthopnea, palpitations, paroxysmal nocturnal dyspnea, and syncope. Taking her medication regularly with no side effects.    She presents for follow up of hypothyroidism. Current symptoms: none. She has no change in energy level, diarrhea, heat / cold intolerance, nervousness, palpitations, or weight changes.     Diabetes  She presents for her follow-up diabetic visit. She has type 2 diabetes mellitus. There are no hypoglycemic associated symptoms. Pertinent negatives for hypoglycemia include no dizziness, headaches or tremors. Pertinent negatives for diabetes include no blurred vision, no chest pain, no fatigue, no foot paresthesias, no foot ulcerations, no polydipsia, no polyphagia, no polyuria, no visual change, no weakness and no weight loss. Risk factors for coronary artery disease include diabetes mellitus, dyslipidemia and hypertension.   Anxiety  Presents for follow-up visit. Symptoms include insomnia. Patient reports no chest pain, compulsions, decreased concentration, depressed mood, dizziness, dry mouth, excessive worry, feeling of choking, hyperventilation, irritability, malaise, muscle tension, nausea, obsessions, palpitations, panic, restlessness, shortness of breath or suicidal ideas. The patient sleeps 6 hours per night. The quality of sleep is good (hard time falling asleep). Nighttime awakenings: none.     Compliance with medications is %.      Past Medical, Surgical, and Family History reviewed and updated in chart.    Reviewed all medications by prescribing practitioner or clinical pharmacist (such as  "prescriptions, OTCs, herbal therapies and supplements) and documented in the medical record.    Patient Self Assessment of Health Status  Patient Self Assessment: Good    Nutrition and Exercise  Current Diet: Diabetic Diet  Adequate Fluid Intake: Yes  Caffeine: Yes  Exercise Frequency: No Exercise    Functional Ability/Level of Safety  Cognitive Impairment Observed: No cognitive impairment observed  Cognitive Impairment Reported: No cognitive impairment reported by patient or family    Home Safety Risk Factors: None    Review of Systems   Constitutional:  Negative for chills, fatigue, fever, irritability and weight loss.   HENT:  Negative for congestion, ear pain, nosebleeds, rhinorrhea and sore throat.    Eyes:  Negative for blurred vision.   Respiratory:  Negative for cough, shortness of breath and wheezing.    Cardiovascular:  Negative for chest pain, palpitations and leg swelling.   Gastrointestinal:  Negative for abdominal pain, constipation, diarrhea, nausea and vomiting.   Endocrine: Negative for cold intolerance, heat intolerance, polydipsia, polyphagia and polyuria.   Genitourinary:  Negative for dysuria, frequency, hematuria and urgency.   Neurological:  Negative for dizziness, tremors, weakness, light-headedness, numbness and headaches.   Hematological:  Negative for adenopathy. Does not bruise/bleed easily.   Psychiatric/Behavioral:  Negative for decreased concentration and suicidal ideas. The patient has insomnia.        Objective   /64   Pulse 94   Temp 36.2 °C (97.2 °F)   Resp 14   Ht 1.651 m (5' 5\")   Wt 56.2 kg (124 lb)   SpO2 97%   BMI 20.63 kg/m²     Physical Exam  Constitutional:       General: She is not in acute distress.     Appearance: Normal appearance.   HENT:      Head: Normocephalic and atraumatic.      Mouth/Throat:      Mouth: Mucous membranes are moist.      Pharynx: Oropharynx is clear. No oropharyngeal exudate or posterior oropharyngeal erythema.   Eyes:      General: No " illness.  Integumentary: Negative except as documented in history of present illness.  Hematology/Lymphatics: Negative except as documented in history of present illness.  Neurologic: Negative except as documented in history of present illness.  Endocrine: Negative except as documented in history of present illness.  Allergy/Immunologic: Negative except as documented in history of present illness.  Psychiatric: Negative except as documented in history of present illness.       Past Medical History  Past Medical History:   Diagnosis Date   • Aortic stenosis, severe    • Essential (primary) hypertension    • Heart palpitations     Past Surgical History  Past Surgical History:   Procedure Laterality Date   • Breast augmentation with implant Bilateral    • Cardiac catherization  02/15/2021   • Liposuction        Social History:  Social History     Tobacco Use   • Smoking status: Former Smoker     Types: Cigarettes     Quit date: 2021     Years since quittin.0   • Smokeless tobacco: Never Used   • Tobacco comment: 1-2 cigarettes per day   Substance Use Topics   • Alcohol use: Yes     Comment: Occasionally   • Drug use: Never    Family History:  History reviewed. No pertinent family history.     Allergies:  ALLERGIES:  No Known Allergies Immunizations:  UTD per patient/family     Medications:  Current Facility-Administered Medications   Medication   • lidocaine-sodium bicarbonate 0.9-8.4% for J-tip administration 0.5-1 mL    Or   • lidocaine 1 % injection 5-10 mg   • dextrose (GLUTOSE) 40 % gel 30 g   • dextrose 50 % injection 25 g   • insulin regular (human) (HumuLIN R, NovoLIN R) sliding scale injection   • scopolamine (TRANSDERM_SCOP) 1 MG/3DAYS patch 1 patch   • lactated ringers infusion   • chlorhexidine gluconate (PERIDEX) 0.12 % solution 15 mL   • ceFAZolin (ANCEF) syringe 2,000 mg   • tranexamic acid (CYKLOKAPRON) 1,000 mg in sodium chloride 0.9 % 500 mL IVPB   • vancomycin 1,250 mg in sodium chloride 0.9%  250 mL IVPB   • sodium chloride 0.9% infusion   • insulin regular (human) (HumuLIN R) 100 units in sodium chloride 0.9% 100 mL infusion   • dextrose 50 % injection 25 g   • dextrose 50 % injection 12.5 g   • glucagon (GLUCAGEN) injection 1 mg   • dextrose (GLUTOSE) 40 % gel 15 g   • dextrose (GLUTOSE) 40 % gel 30 g   • dexMEDETomidine (PRECEDEX) 400 mcg/100 mL in sodium chloride 0.9 % infusion   • ketamine (KETALAR) 500 mg/250 mL in sodium chloride 0.9 % infusion     Facility-Administered Medications Ordered in Other Encounters   Medication   • heparin (porcine) injection   • tranexamic acid (CYKLOKAPRON) injection   • mannitol 25 % injection   • sodium bicarbonate 8.4 % injection       Vitals:  Visit Vitals  BP (!) 159/92   Pulse 68   Temp 97.7 °F (36.5 °C) (Temporal)   Resp (!) 23   Ht 5' 3\" (1.6 m)   Wt 79.2 kg (174 lb 9.7 oz)   SpO2 98%   BMI 30.93 kg/m²       Exam:  General: Intubated, sedated, RASS -5  Head: Normocephalic, atraumatic  Eyes: Pupils equal, round, and reactive to light, no icterus; 3mm  ENT: ETT size 7, 19 at the lip; no secretions. OGT, no drainage.  Neck: right IJ mac PA catheter  Chest: Mid-sternal incision with dressing; c/d/i. 2 chest tubes to -20 wall suction; no drainage. Pacer wires tape to chest, off.  Lungs: Clear breath sounds bilaterally, equal; no wheeze.  Heart: Sinus rhythm, regular rate, mechanical valve click audible to auscultation  Abdomen: Soft, hypoactive bowel sounds in all quadrants  Genitourinary: normal anatomy; zazueta draining clear, yellow urine  Extremities: Warm and well perfused, trace edema;pulses palpable 2+ in radials, DPs, PTs  Neurologic: intubated, sedated; RASS -5  Psychiatric: MICHAEL    Labs  Admission on 03/10/2021   Component Date Value Ref Range Status   • BASE EXCESS / DEFICIT, ARTERIAL - * 03/10/2021 4* -2 - 3 mmol/L Final   • HCO3, ARTERIAL - RESPIRATORY 03/10/2021 28  22 - 28 mmol/L Final   • O2 CONTENT, ARTERIAL - RESPIRATORY 03/10/2021 16  15 - 23 %  scleral icterus.     Extraocular Movements: Extraocular movements intact.      Pupils: Pupils are equal, round, and reactive to light.   Cardiovascular:      Rate and Rhythm: Normal rate and regular rhythm.      Pulses: Normal pulses.      Heart sounds: No murmur heard.     No friction rub. No gallop.   Pulmonary:      Effort: Pulmonary effort is normal.      Breath sounds: No wheezing, rhonchi or rales.   Musculoskeletal:      Cervical back: Normal range of motion and neck supple.      Right lower leg: No edema.      Left lower leg: No edema.   Skin:     General: Skin is warm.      Coloration: Skin is not jaundiced or pale.      Findings: No erythema or rash.   Neurological:      General: No focal deficit present.      Mental Status: She is alert and oriented to person, place, and time.      Cranial Nerves: No cranial nerve deficit.      Sensory: No sensory deficit.      Coordination: Coordination normal.      Gait: Gait normal.         Assessment/Plan   Problem List Items Addressed This Visit       Acquired hypothyroidism     Well-controlled, continue current medications and management.         Relevant Orders    Thyroid Stimulating Hormone    Thyroxine, Free    Follow Up In [facility] - PCP    Chronic kidney disease, stage 3a (Multi)     Stable, continue current medications and management.         Relevant Orders    Follow Up In [facility] - PCP    COPD with chronic bronchitis (Multi)     Chronic Condition Documentation : Stable based on symptoms and exam.  Continue established treatment plan and follow-up at least yearly.         Essential hypertension     Well-controlled, continue current medications and management.         Relevant Orders    Comprehensive Metabolic Panel    CBC and Auto Differential    Lipid Panel    Hemoglobin A1C    Follow Up In [facility] - PCP    Generalized anxiety disorder     Stable, continue current medications and management.  The risks and benefits of  Final   • PCO2, ARTERIAL - RESPIRATORY 03/10/2021 39  32 - 45 mm Hg Final   • PH, ARTERIAL - RESPIRATORY 03/10/2021 7.47* 7.35 - 7.45 Units Final   • PO2, ARTERIAL - RESPIRATORY 03/10/2021 94  83 - 108 mm Hg Final   • O2 SATURATION, ARTERIAL - RESPIRAT* 03/10/2021 99  95 - 99 % Final   • CARBOXYHEMOGLOBIN - RESPIRATORY 03/10/2021 1.2* 1.5 - 15.0 % Final   • FIO2 - RESPIRATORY 03/10/2021 21  % Final   • HEMOGLOBIN - RESPIRATORY 03/10/2021 11.6* 12.0 - 15.5 g/dL Final   • METHEMOGLOBIN - RESPIRATORY 03/10/2021 0.3  <=1.6 % Final   • OXYHEMOGLOBIN, ARTERIAL - RESPIRAT* 03/10/2021 97.4  94.0 - 98.0 % Final   • P/F RATIO - RESPIRATORY 03/10/2021 462  300 - 500 Final   • SITE - RESPIRATORY 03/10/2021 Arterial Line   Final   • TEMPERATURE - RESPIRATORY 03/10/2021 36.5  degrees Final   • SODIUM - RESPIRATORY 03/10/2021 138  135 - 145 mmol/L Final   • POTASSIUM - RESPIRATORY 03/10/2021 3.7  3.4 - 5.1 mmol/L Final   • CHLORIDE - RESPIRATORY 03/10/2021 108* 98 - 107 mmol/L Final   • GLUCOSE - RESPIRATORY 03/10/2021 87  65 - 99 mg/dL Final   • CALCIUM, IONIZED - RESPIRATORY 03/10/2021 1.20  1.15 - 1.29 mmol/L Final   • HEMATOCRIT - RESPIRATORY 03/10/2021 35.0* 36.0 - 46.5 % Final   • URINE PREGNANCY,QUAL 03/10/2021 Negative  Negative Final    verified Clare Sutherland Lot#[id number]   • Internal Procedural Controls Accep* 03/10/2021 Yes   Final       Imaging:  No orders to display      Assessment    46 y/o female with PMH of hypertension and aortic valve stenosis s/p AVR with 21 mm st jj mechanical prosthesis POD #0     Problems  Aortic stenosis s/p aortic valve replacement POD #0  acute blood loss anemia  Thrombocytopenia  leukocytosis  Hx of htn  Current everyday smoker    Plan:    Neuro: Patient is currently intubated, RASS -5. Currently receiving ketamine/dexmedetomidine infusions. Wean sedation as tolerated to goal RASS 0 to -1 once settled. PRN morphine/dilaudid while intubated. Gabapentin/tylenol scheduled.     CV: hx of htn,  my recommendations, as well as other treatment options were discussed with the patient today.    The side effects of the medications were discussed.  Advised not to take the medication with alcohol .  Exercise regularly and this can give you a sense of well being and help decrease feelings of anxiety.;  Get plenty of sleep. Sleep rests your brain as well as your body, and can improve your general sense of wellbeing as well as your mood.;  Avoid alcohol and drug abuse. It may seem that alcohol or drugs relax you. But in the long run they make anxiety worse and cause more problems.;  Avoid caffeine. Caffeine is found in coffee, tea, soft drinks and chocolate. Caffeine may increase your sense of anxiety because it stimulates your nervous system. Also avoid over-the-counter diet pills, and cough and cold medicines that contain a decongestant.;  Confront the things that have made you anxious in the past. Begin by just picturing yourself confronting these things. By doing this, you can get used to the idea of confronting the things that make you anxious before you actually do it. After you feel more comfortable picturing yourself confronting these things, you can begin to actually face them.;  If you feel yourself getting anxious, practice a relaxation technique or focus on a simple task, such as counting backward from 100 to 0.;  Although feelings of anxiety are scary, they won't hurt you. Label the level of your fear from 0 to 10 and keep track as it goes up and down. Notice that it doesn't stay at a very high level for more than a few seconds. When the fear comes, accept it. Wait and give it time to pass without running away from it.;  Take medications as prescribed.         Relevant Medications    ALPRAZolam (Xanax) 1 mg tablet    Other Relevant Orders    Follow Up In [facility] - PCP    GERD (gastroesophageal reflux disease)     Well-controlled, continue current medications and management.         Relevant  Medications    pantoprazole (ProtoNix) 40 mg EC tablet    Mixed hyperlipidemia     The nature of cardiac risk has been fully discussed with this patient. Discussed cardiovascular risk analysis and appropriate diet with the need for lifelong measures to reduce the risk. A regular exercise program is recommended to help achieve and maintain normal body weight, fitness and improve lipid balance. Patient education provided. They understand and agree with this course of treatment. They will return with new or worsening symptoms. Patient instructed to remain current with appropriate annual health maintenance.          Relevant Orders    Comprehensive Metabolic Panel    CBC and Auto Differential    Lipid Panel    Hemoglobin A1C    Follow Up In [facility] - PCP    Routine general medical examination at [facility] care facility - Primary     Recommend low-cholesterol diet, low-fat diet and low-salt diet.  The need for lifelong dietary compliance in order to reduce cardiac risk is recommended.  We will also recommend regular exercise program to improve lipid balance and overall health.  Recommend decreasing fat and cholesterol in diet, increasing aerobic exercise with a goal of 4 or more days per week           Type 2 diabetes mellitus with stage 3a chronic kidney disease, without long-term current use of insulin (Multi)     Diabetes Mellitus type II, under good control.  1. Rx changes: None  2. Education: Reviewed ‘ABCs’ of diabetes management (respective goals in parentheses):  A1C (<7), blood pressure (<130/80), and cholesterol (LDL <100).  3. Compliance at present is estimated to be good. Efforts to improve compliance (if necessary) will be directed at dietary modifications: and increased exercise.  4. Follow up: 3 months         Relevant Orders    Comprehensive Metabolic Panel    CBC and Auto Differential    Lipid Panel    Hemoglobin A1C    Follow Up In [facility] - PCP     [facility]:  Darius Quezada MD  aortic stenosis s/p AVR. Currently off norepinephrine. Goal MAP > 60. Current CVP 11 laying flat, CI 2.2. Patient did not require dobutamine throughout the case per sign out. Goal CI 2.2 or greater. Did receive amiodarone bolus in OR for arrhythmia. Pacer wires taped to chest, off. Monitor chest tube output and notify if > 200 mls/hr x 2 hrs. BB tomorrow.    Pulm: Current everyday smoker. Patient is mechanically ventilated; 7.0 ETT, 19 at the lip. 14/480/50%/5. Wean fio2 for spo2 93-97%. Pressure support once more awake and plan to wean to extubate if hemodynamically stable.    GI: OGT to LIS. H2B. Bowel regimen ordered. Once extubated, bedside nursing swallow evaluation and advance diet as tolerated.    Renal/: Cr 0.56. Urine output 1.2L in OR. Volume assessment is ongoing, no immediate needs right now. Albumin PRN per standing orders.     ID: Leukocytosis most likely reactive from OR. Receiving vanc/cefazolin for surgical prophylaxis    Heme: hgb 10.4, plts 111 --> down from 11.5 and 210 preoperatively, respectively. No transfusions in OR. No active needs. ASA ordered for tomorrow AM. Coumadin to start tonight per CV surgery. Heparin infusion ordered to start at 2000 tonight as bridge, as long as chest tube output minimal per CV surgery.    Endo: no history of endocrinopathies. Insulin infusion per protocol.    Lines/drains/tubes: MAC cordis with PA catheter, arterial line, ETT, chest tubes, zazueta  Activity: bedrest  Prophylaxis: SCDs, H2 blocker, CHG  Disposition: ICU  Code Status: Full Code          PENNY Rocha student      PENNY Lara-BC  Critical Care Nurse Practitioner    on 3/12/2025 10:59 AM  I have personally reviewed the [facility] report for Katelyn Weiss. I have considered the risks of abuse, dependence, addiction and diversion    Is the patient prescribed a combination of a benzodiazepine and opioid?  No    Last Urine Drug Screen / ordered today: No  Recent Results (from the past 8760 hours)   POCT waived urine drug screen manually resulted    Collection Time: 09/04/24 11:45 AM   Result Value Ref Range    POC THC Negative Negative, Not applicable ng/mL    POC Cocaine Negative Negative, Not applicable ng/mL    POC Opiates Negative Negative, Not applicable ng/mL    POC Amphetamine Negative Negative, Not applicable ng/mL    POC Phencyclidine (PCP) Negative Negative, Not applicable ng/mL    POC Barbiturates Negative Negative, Not applicable ng/mL    POC Benzodiazepines Positive (A) Negative, Not applicable ng/mL    POC Methamphetamine Negative Negative, Not applicable ng/mL    POC METHADONE MANUALLY ENTERED Negative Negative, Not applicable ng/mL    POC Ticyclic Antidepressants (TCA) Negative Negative, Not applicable ng/mL    POC Oxycodone Negative Negative, Not applicable ng/mL    POC MDMA URINE Negative Negative, Not applicable ng/mL    POC Morphine Urine Negative Negative, Not applicable ng/mL    POC Burprenorphine Urine Negative Negative, Not applicable ng/mL     Controlled Substance Agreement:  Date of the Last Agreement: 3/12/2025  Reviewed Controlled Substance Agreement including but not limited to the benefits, risks, and alternatives to treatment with a Controlled Substance medication(s).    Benzodiazepines:  What is the patient's goal of therapy? Able to function without being unduly anxious and carry out day-to-day activities without tension and panic attacks    Is this being achieved with current treatment? yes    JOSE-7:  Over the last 2 weeks, how often have you been bothered by any of the following problems?  Feeling nervous, anxious, or on edge: 0  Not being able to  stop or control worryin  Worrying too much about different things: 0  Trouble relaxin  Being so restless that it is hard to sit still: 0  Becoming easily annoyed or irritable: 0  Feeling afraid as if something awful might happen: 0  JOSE-7 Total Score: 0    Activities of Daily Living:   Is your overall impression that this patient is benefiting (symptom reduction outweighs side effects) from benzodiazepine therapy? Yes     1. Physical Functioning: Better  2. Family Relationship: Better  3. Social Relationship: Better  4. Mood: Better  5. Sleep Patterns: Better  6. Overall Function: Better    Scribe Attestation  By signing my name below, IEze Scribe   attest that this documentation has been prepared under the direction and in the presence of Darius Quezada MD.    Patient was identified as a fall risk. Risk prevention instructions provided.

## 2025-03-12 NOTE — PATIENT INSTRUCTIONS

## 2025-03-13 DIAGNOSIS — E78.2 MIXED HYPERLIPIDEMIA: ICD-10-CM

## 2025-03-13 DIAGNOSIS — N18.31 TYPE 2 DIABETES MELLITUS WITH STAGE 3A CHRONIC KIDNEY DISEASE, WITHOUT LONG-TERM CURRENT USE OF INSULIN (MULTI): ICD-10-CM

## 2025-03-13 DIAGNOSIS — E11.22 TYPE 2 DIABETES MELLITUS WITH STAGE 3A CHRONIC KIDNEY DISEASE, WITHOUT LONG-TERM CURRENT USE OF INSULIN (MULTI): ICD-10-CM

## 2025-03-13 DIAGNOSIS — E03.9 ACQUIRED HYPOTHYROIDISM: ICD-10-CM

## 2025-03-13 LAB
ALBUMIN SERPL-MCNC: 4.7 G/DL (ref 3.6–5.1)
ALP SERPL-CCNC: 83 U/L (ref 37–153)
ALT SERPL-CCNC: 9 U/L (ref 6–29)
ANION GAP SERPL CALCULATED.4IONS-SCNC: 7 MMOL/L (CALC) (ref 7–17)
AST SERPL-CCNC: 16 U/L (ref 10–35)
BASOPHILS # BLD AUTO: 52 CELLS/UL (ref 0–200)
BASOPHILS NFR BLD AUTO: 1.1 %
BILIRUB SERPL-MCNC: 0.7 MG/DL (ref 0.2–1.2)
BUN SERPL-MCNC: 12 MG/DL (ref 7–25)
CALCIUM SERPL-MCNC: 9.6 MG/DL (ref 8.6–10.4)
CHLORIDE SERPL-SCNC: 97 MMOL/L (ref 98–110)
CHOLEST SERPL-MCNC: 151 MG/DL
CHOLEST/HDLC SERPL: 2.6 (CALC)
CO2 SERPL-SCNC: 32 MMOL/L (ref 20–32)
CREAT SERPL-MCNC: 0.71 MG/DL (ref 0.6–0.95)
EGFRCR SERPLBLD CKD-EPI 2021: 81 ML/MIN/1.73M2
EOSINOPHIL # BLD AUTO: 141 CELLS/UL (ref 15–500)
EOSINOPHIL NFR BLD AUTO: 3 %
ERYTHROCYTE [DISTWIDTH] IN BLOOD BY AUTOMATED COUNT: 12.2 % (ref 11–15)
EST. AVERAGE GLUCOSE BLD GHB EST-MCNC: 137 MG/DL
EST. AVERAGE GLUCOSE BLD GHB EST-SCNC: 7.6 MMOL/L
GLUCOSE SERPL-MCNC: 125 MG/DL (ref 65–99)
HBA1C MFR BLD: 6.4 % OF TOTAL HGB
HCT VFR BLD AUTO: 38.5 % (ref 35–45)
HDLC SERPL-MCNC: 59 MG/DL
HGB BLD-MCNC: 12.8 G/DL (ref 11.7–15.5)
LDLC SERPL CALC-MCNC: 71 MG/DL (CALC)
LYMPHOCYTES # BLD AUTO: 1184 CELLS/UL (ref 850–3900)
LYMPHOCYTES NFR BLD AUTO: 25.2 %
MCH RBC QN AUTO: 30 PG (ref 27–33)
MCHC RBC AUTO-ENTMCNC: 33.2 G/DL (ref 32–36)
MCV RBC AUTO: 90.4 FL (ref 80–100)
MONOCYTES # BLD AUTO: 367 CELLS/UL (ref 200–950)
MONOCYTES NFR BLD AUTO: 7.8 %
NEUTROPHILS # BLD AUTO: 2956 CELLS/UL (ref 1500–7800)
NEUTROPHILS NFR BLD AUTO: 62.9 %
NONHDLC SERPL-MCNC: 92 MG/DL (CALC)
PLATELET # BLD AUTO: 328 THOUSAND/UL (ref 140–400)
PMV BLD REES-ECKER: 10.5 FL (ref 7.5–12.5)
POTASSIUM SERPL-SCNC: 4.5 MMOL/L (ref 3.5–5.3)
PROT SERPL-MCNC: 7.1 G/DL (ref 6.1–8.1)
RBC # BLD AUTO: 4.26 MILLION/UL (ref 3.8–5.1)
SODIUM SERPL-SCNC: 136 MMOL/L (ref 135–146)
T4 FREE SERPL-MCNC: 1.6 NG/DL (ref 0.8–1.8)
TRIGL SERPL-MCNC: 119 MG/DL
TSH SERPL-ACNC: 1.47 MIU/L (ref 0.4–4.5)
WBC # BLD AUTO: 4.7 THOUSAND/UL (ref 3.8–10.8)

## 2025-03-28 ENCOUNTER — OFFICE VISIT (OUTPATIENT)
Dept: ORTHOPEDIC SURGERY | Facility: CLINIC | Age: OVER 89
End: 2025-03-28
Payer: MEDICARE

## 2025-03-28 DIAGNOSIS — S92.352A CLOSED FRACTURE OF BASE OF FIFTH METATARSAL BONE OF LEFT FOOT, INITIAL ENCOUNTER: Primary | ICD-10-CM

## 2025-03-28 PROCEDURE — 99213 OFFICE O/P EST LOW 20 MIN: CPT | Performed by: FAMILY MEDICINE

## 2025-03-28 NOTE — PROGRESS NOTES
Established Patient Follow-Up Visit    CC:   Chief Complaint   Patient presents with    Left Thumb - Follow-up       HPI:  Katelyn is a 89 y.o. female returns here today for follow-up visit regarding: Follow-up left base of fifth metatarsal fracture.  Patient returns here today 6 weeks out from injury.  She denies any numbness tingling or burning she is hopeful to get out of the cast shoe.          REVIEW OF SYSTEMS:  GENERAL: Negative for malaise, significant weight loss, fever  MUSCULOSKELETAL: See HPI  NEURO: Negative for numbness / tingling       PHYSICAL EXAM:  -Neuro: Gross sensation intact to the lower extremities bilaterally.  -Extremity: Left foot exam demonstrates skin which is warm pink well-perfused no open cuts wounds or sores no tenderness palpation over the base of fifth metatarsal no distal metatarsal pain she is able stand place full weightbearing walk around the room with a normal nonantalgic gait.  Calf is soft nontender.    IMAGING: No new imaging today      PROCEDURE: None  Procedures     ASSESSMENT:   Follow-up visit for:  Problem List Items Addressed This Visit    None  Visit Diagnoses       Closed fracture of base of fifth metatarsal bone of left foot, initial encounter    -  Primary             PLAN: At this time we will defer repeat x-rays as the patient is feeling wonderful and has not yet eaten and would like to get going.  She has no pain or discomfort has no additional issues or concerns.  Will defer repeat x-rays and allow for the patient to return to regular tennis shoes and footwear as tolerated if there is worsening issues or persistent pain they should call or return otherwise we will see her back as needed she can always hop back into the postop shoe for a few hours or couple days with any new change in pain or discomfort however she understands she should return with any significant worsening issues.  No orders of the defined types were placed in this encounter.          At the  conclusion of the visit there were no further questions by the patient/family regarding their plan of care.  Patient was instructed to call or return with any issues, questions, or concerns regarding their injury and/or treatment plan described above.     03/28/25 at 5:29 PM - Cole C Budinsky, MD    Office: [phone]    This note was prepared using voice recognition software.  The details of this note are correct and have been reviewed, and corrected to the best of my ability.  Some grammatical errors may persist related to the Dragon software.

## 2025-05-01 ENCOUNTER — HOSPITAL ENCOUNTER (OUTPATIENT)
Dept: RADIOLOGY | Facility: HOSPITAL | Age: OVER 89
Discharge: HOME | End: 2025-05-01
Payer: MEDICARE

## 2025-05-01 DIAGNOSIS — R91.1 LUNG NODULE: ICD-10-CM

## 2025-05-01 PROCEDURE — 71250 CT THORAX DX C-: CPT

## 2025-05-06 ENCOUNTER — TELEPHONE (OUTPATIENT)
Facility: CLINIC | Age: OVER 89
End: 2025-05-06
Payer: MEDICARE

## 2025-05-06 DIAGNOSIS — R91.1 LUNG NODULE: Primary | ICD-10-CM

## 2025-05-06 NOTE — TELEPHONE ENCOUNTER
Spoke with MsConcha Isela and Kayla her daughter in law about CT Chest results. Lung nodule is stable since 8/2024. Will get a repeat CT Chest in 12 months (5/2026) for continued stability.

## 2025-06-09 DIAGNOSIS — E03.9 ACQUIRED HYPOTHYROIDISM: ICD-10-CM

## 2025-06-09 DIAGNOSIS — E78.2 MIXED HYPERLIPIDEMIA: ICD-10-CM

## 2025-06-09 DIAGNOSIS — N18.31 TYPE 2 DIABETES MELLITUS WITH STAGE 3A CHRONIC KIDNEY DISEASE, WITHOUT LONG-TERM CURRENT USE OF INSULIN (MULTI): ICD-10-CM

## 2025-06-09 DIAGNOSIS — E11.22 TYPE 2 DIABETES MELLITUS WITH STAGE 3A CHRONIC KIDNEY DISEASE, WITHOUT LONG-TERM CURRENT USE OF INSULIN (MULTI): ICD-10-CM

## 2025-06-13 DIAGNOSIS — E03.9 ACQUIRED HYPOTHYROIDISM: ICD-10-CM

## 2025-06-13 LAB
ALBUMIN SERPL-MCNC: 4.5 G/DL (ref 3.6–5.1)
ALP SERPL-CCNC: 79 U/L (ref 37–153)
ALT SERPL-CCNC: 6 U/L (ref 6–29)
ANION GAP SERPL CALCULATED.4IONS-SCNC: 10 MMOL/L (CALC) (ref 7–17)
AST SERPL-CCNC: 15 U/L (ref 10–35)
BILIRUB SERPL-MCNC: 0.8 MG/DL (ref 0.2–1.2)
BUN SERPL-MCNC: 10 MG/DL (ref 7–25)
CALCIUM SERPL-MCNC: 8.9 MG/DL (ref 8.6–10.4)
CHLORIDE SERPL-SCNC: 98 MMOL/L (ref 98–110)
CHOLEST SERPL-MCNC: 141 MG/DL
CHOLEST/HDLC SERPL: 2.8 (CALC)
CO2 SERPL-SCNC: 30 MMOL/L (ref 20–32)
CREAT SERPL-MCNC: 0.74 MG/DL (ref 0.6–0.95)
EGFRCR SERPLBLD CKD-EPI 2021: 77 ML/MIN/1.73M2
EST. AVERAGE GLUCOSE BLD GHB EST-MCNC: 126 MG/DL
EST. AVERAGE GLUCOSE BLD GHB EST-SCNC: 7 MMOL/L
GLUCOSE SERPL-MCNC: 96 MG/DL (ref 65–99)
HBA1C MFR BLD: 6 %
HDLC SERPL-MCNC: 51 MG/DL
LDLC SERPL CALC-MCNC: 73 MG/DL (CALC)
NONHDLC SERPL-MCNC: 90 MG/DL (CALC)
POTASSIUM SERPL-SCNC: 4.5 MMOL/L (ref 3.5–5.3)
PROT SERPL-MCNC: 6.9 G/DL (ref 6.1–8.1)
SODIUM SERPL-SCNC: 138 MMOL/L (ref 135–146)
T4 FREE SERPL-MCNC: 1.7 NG/DL (ref 0.8–1.8)
TRIGL SERPL-MCNC: 91 MG/DL
TSH SERPL-ACNC: 1.02 MIU/L (ref 0.4–4.5)

## 2025-06-13 RX ORDER — LEVOTHYROXINE SODIUM 50 UG/1
TABLET ORAL
Qty: 90 TABLET | Refills: 1 | Status: SHIPPED | OUTPATIENT
Start: 2025-06-13

## 2025-06-16 ENCOUNTER — APPOINTMENT (OUTPATIENT)
Dept: PRIMARY CARE | Facility: CLINIC | Age: OVER 89
End: 2025-06-16
Payer: MEDICARE

## 2025-06-16 VITALS
DIASTOLIC BLOOD PRESSURE: 64 MMHG | SYSTOLIC BLOOD PRESSURE: 118 MMHG | HEART RATE: 69 BPM | WEIGHT: 125 LBS | BODY MASS INDEX: 20.83 KG/M2 | RESPIRATION RATE: 16 BRPM | HEIGHT: 65 IN | OXYGEN SATURATION: 97 % | TEMPERATURE: 97.1 F

## 2025-06-16 DIAGNOSIS — M20.012 MALLET FINGER OF LEFT FINGER(S): ICD-10-CM

## 2025-06-16 DIAGNOSIS — J30.9 CHRONIC ALLERGIC RHINITIS: ICD-10-CM

## 2025-06-16 DIAGNOSIS — E11.22 TYPE 2 DIABETES MELLITUS WITH STAGE 3A CHRONIC KIDNEY DISEASE, WITHOUT LONG-TERM CURRENT USE OF INSULIN (MULTI): Primary | ICD-10-CM

## 2025-06-16 DIAGNOSIS — E78.2 MIXED HYPERLIPIDEMIA: ICD-10-CM

## 2025-06-16 DIAGNOSIS — I10 ESSENTIAL HYPERTENSION: ICD-10-CM

## 2025-06-16 DIAGNOSIS — F41.1 GENERALIZED ANXIETY DISORDER: ICD-10-CM

## 2025-06-16 DIAGNOSIS — E03.9 ACQUIRED HYPOTHYROIDISM: ICD-10-CM

## 2025-06-16 DIAGNOSIS — N18.31 TYPE 2 DIABETES MELLITUS WITH STAGE 3A CHRONIC KIDNEY DISEASE, WITHOUT LONG-TERM CURRENT USE OF INSULIN (MULTI): Primary | ICD-10-CM

## 2025-06-16 DIAGNOSIS — N18.31 CHRONIC KIDNEY DISEASE, STAGE 3A (MULTI): ICD-10-CM

## 2025-06-16 PROCEDURE — 3074F SYST BP LT 130 MM HG: CPT | Performed by: FAMILY MEDICINE

## 2025-06-16 PROCEDURE — 99214 OFFICE O/P EST MOD 30 MIN: CPT | Performed by: FAMILY MEDICINE

## 2025-06-16 PROCEDURE — G2211 COMPLEX E/M VISIT ADD ON: HCPCS | Performed by: FAMILY MEDICINE

## 2025-06-16 PROCEDURE — 1036F TOBACCO NON-USER: CPT | Performed by: FAMILY MEDICINE

## 2025-06-16 PROCEDURE — 3078F DIAST BP <80 MM HG: CPT | Performed by: FAMILY MEDICINE

## 2025-06-16 PROCEDURE — 1159F MED LIST DOCD IN RCRD: CPT | Performed by: FAMILY MEDICINE

## 2025-06-16 PROCEDURE — 1160F RVW MEDS BY RX/DR IN RCRD: CPT | Performed by: FAMILY MEDICINE

## 2025-06-16 RX ORDER — ALPRAZOLAM 1 MG/1
1 TABLET ORAL 2 TIMES DAILY PRN
Qty: 60 TABLET | Refills: 1 | Status: SHIPPED | OUTPATIENT
Start: 2025-06-16

## 2025-06-16 RX ORDER — ATORVASTATIN CALCIUM 20 MG/1
20 TABLET, FILM COATED ORAL DAILY
Qty: 90 TABLET | Refills: 2 | Status: SHIPPED | OUTPATIENT
Start: 2025-06-16

## 2025-06-16 RX ORDER — METFORMIN HYDROCHLORIDE 500 MG/1
500 TABLET, EXTENDED RELEASE ORAL
Qty: 90 TABLET | Refills: 1 | Status: SHIPPED | OUTPATIENT
Start: 2025-06-16

## 2025-06-16 ASSESSMENT — ENCOUNTER SYMPTOMS
ABDOMINAL PAIN: 0
WHEEZING: 0
WEAKNESS: 1
DIZZINESS: 0
DEPRESSED MOOD: 0
DIARRHEA: 0
WEIGHT LOSS: 0
RHINORRHEA: 0
DYSURIA: 0
DECREASED CONCENTRATION: 0
IRRITABILITY: 1
FATIGUE: 0
NAUSEA: 0
HEADACHES: 0
FREQUENCY: 0
PANIC: 0
POLYDIPSIA: 0
TREMORS: 0
COUGH: 0
CHILLS: 0
HEMATURIA: 0
BLURRED VISION: 0
PALPITATIONS: 0
POLYPHAGIA: 0
INSOMNIA: 0
SHORTNESS OF BREATH: 0
FEVER: 0
VISUAL CHANGE: 0
SORE THROAT: 0
RESTLESSNESS: 0
CONSTIPATION: 0
VOMITING: 0
NUMBNESS: 0

## 2025-06-16 ASSESSMENT — ANXIETY QUESTIONNAIRES
7. FEELING AFRAID AS IF SOMETHING AWFUL MIGHT HAPPEN: NOT AT ALL
1. FEELING NERVOUS, ANXIOUS, OR ON EDGE: NOT AT ALL
GAD7 TOTAL SCORE: 1
IF YOU CHECKED OFF ANY PROBLEMS ON THIS QUESTIONNAIRE, HOW DIFFICULT HAVE THESE PROBLEMS MADE IT FOR YOU TO DO YOUR WORK, TAKE CARE OF THINGS AT HOME, OR GET ALONG WITH OTHER PEOPLE: NOT DIFFICULT AT ALL
6. BECOMING EASILY ANNOYED OR IRRITABLE: SEVERAL DAYS
4. TROUBLE RELAXING: NOT AT ALL
3. WORRYING TOO MUCH ABOUT DIFFERENT THINGS: NOT AT ALL
2. NOT BEING ABLE TO STOP OR CONTROL WORRYING: NOT AT ALL
5. BEING SO RESTLESS THAT IT IS HARD TO SIT STILL: NOT AT ALL

## 2025-06-16 ASSESSMENT — PATIENT HEALTH QUESTIONNAIRE - PHQ9
1. LITTLE INTEREST OR PLEASURE IN DOING THINGS: NOT AT ALL
SUM OF ALL RESPONSES TO PHQ9 QUESTIONS 1 AND 2: 0
2. FEELING DOWN, DEPRESSED OR HOPELESS: NOT AT ALL

## 2025-06-16 NOTE — PATIENT INSTRUCTIONS

## 2025-06-16 NOTE — PROGRESS NOTES
Subjective   Patient ID: Katelyn Weiss is a 89 y.o. female who presents for Follow-up (Diabetes, Hypertension, Hyperlipidemia, Hypothyroidism, Anxiety, Depression and labs) and Allergies.    Patient is here for follow-up on hypertension and hyperlipidemia. She has no chest pain, dyspnea, exertional chest pressure/discomfort, near-syncope, orthopnea, palpitations, paroxysmal nocturnal dyspnea, and syncope. Taking her medication regularly with no side effects.    She presents for follow up of hypothyroidism. Current symptoms: none. She has no change in energy level, diarrhea, heat / cold intolerance, nervousness, palpitations, or weight changes.     Patient complains of allergies. Onset was several weeks ago. Associated symptoms include: runny nose, sneezing, and cough. Symptoms have been gradually worsening.    She complains of her left index finger and middle finger being deformed. She states they have been like this a long time.    Diabetes  She presents for her follow-up diabetic visit. She has type 2 diabetes mellitus. Her disease course has been improving. There are no hypoglycemic associated symptoms. Pertinent negatives for hypoglycemia include no dizziness, headaches or tremors. Associated symptoms include weakness. Pertinent negatives for diabetes include no blurred vision, no chest pain, no fatigue, no foot ulcerations, no polydipsia, no polyphagia, no polyuria, no visual change and no weight loss. Risk factors for coronary artery disease include diabetes mellitus, dyslipidemia and hypertension.   Anxiety  Presents for follow-up visit. Symptoms include irritability. Patient reports no chest pain, decreased concentration, depressed mood, dizziness, excessive worry, insomnia, nausea, palpitations, panic, restlessness, shortness of breath or suicidal ideas. Symptoms occur rarely. The quality of sleep is fair.     Compliance with medications is %.        Review of Systems   Constitutional:  Positive for  "irritability. Negative for chills, fatigue, fever and weight loss.   HENT:  Negative for congestion, ear pain, nosebleeds, rhinorrhea and sore throat.    Eyes:  Negative for blurred vision.   Respiratory:  Negative for cough, shortness of breath and wheezing.    Cardiovascular:  Negative for chest pain, palpitations and leg swelling.   Gastrointestinal:  Negative for abdominal pain, constipation, diarrhea, nausea and vomiting.   Endocrine: Negative for polydipsia, polyphagia and polyuria.   Genitourinary:  Negative for dysuria, frequency and hematuria.   Neurological:  Positive for weakness. Negative for dizziness, tremors, numbness and headaches.   Psychiatric/Behavioral:  Negative for decreased concentration and suicidal ideas. The patient does not have insomnia.      Objective   /64   Pulse 69   Temp 36.2 °C (97.1 °F)   Resp 16   Ht 1.651 m (5' 5\")   Wt 56.7 kg (125 lb)   SpO2 97%   BMI 20.80 kg/m²     Physical Exam  Constitutional:       General: She is not in acute distress.     Appearance: Normal appearance.   HENT:      Head: Normocephalic and atraumatic.      Mouth/Throat:      Mouth: Mucous membranes are moist.      Pharynx: Oropharynx is clear. No oropharyngeal exudate or posterior oropharyngeal erythema.   Eyes:      General: No scleral icterus.     Extraocular Movements: Extraocular movements intact.      Pupils: Pupils are equal, round, and reactive to light.   Cardiovascular:      Rate and Rhythm: Normal rate and regular rhythm.      Pulses: Normal pulses.      Heart sounds: S1 normal and S2 normal. Murmur heard.      Systolic murmur is present with a grade of 1/6.      No friction rub. No gallop. No S3 or S4 sounds.   Pulmonary:      Effort: Pulmonary effort is normal.      Breath sounds: No wheezing, rhonchi or rales.   Musculoskeletal:      Right lower leg: No edema.      Left lower leg: No edema.   Skin:     General: Skin is warm.      Coloration: Skin is not jaundiced or pale.      " Findings: No erythema or rash.   Neurological:      General: No focal deficit present.      Mental Status: She is alert and oriented to person, place, and time.      Cranial Nerves: No cranial nerve deficit.      Sensory: No sensory deficit.      Coordination: Coordination normal.      Gait: Gait normal.         Orders Only on 06/09/2025   Component Date Value Ref Range Status    CHOLESTEROL, TOTAL 06/12/2025 141  <200 mg/dL Final    HDL CHOLESTEROL 06/12/2025 51  > OR = 50 mg/dL Final    TRIGLYCERIDES 06/12/2025 91  <150 mg/dL Final    LDL-CHOLESTEROL 06/12/2025 73  mg/dL (calc) Final    Comment: Reference range: <100     Desirable range <100 mg/dL for primary prevention;    <70 mg/dL for patients with CHD or diabetic patients   with > or = 2 CHD risk factors.     LDL-C is now calculated using the Tatianna   calculation, which is a validated novel method providing   better accuracy than the Friedewald equation in the   estimation of LDL-C.   Chavo GONZALEZ et al. GABRIELLE. 2013;310(19): 1622-7599   (http://education.Androcial.Xoomsys/faq/TIV961)      CHOL/HDLC RATIO 06/12/2025 2.8  <5.0 (calc) Final    NON HDL CHOLESTEROL 06/12/2025 90  <130 mg/dL (calc) Final    Comment: For patients with diabetes plus 1 major ASCVD risk   factor, treating to a non-HDL-C goal of <100 mg/dL   (LDL-C of <70 mg/dL) is considered a therapeutic   option.      GLUCOSE 06/12/2025 96  65 - 99 mg/dL Final    Comment:               Fasting reference interval         UREA NITROGEN (BUN) 06/12/2025 10  7 - 25 mg/dL Final    CREATININE 06/12/2025 0.74  0.60 - 0.95 mg/dL Final    EGFR 06/12/2025 77  > OR = 60 mL/min/1.73m2 Final    SODIUM 06/12/2025 138  135 - 146 mmol/L Final    POTASSIUM 06/12/2025 4.5  3.5 - 5.3 mmol/L Final    CHLORIDE 06/12/2025 98  98 - 110 mmol/L Final    CARBON DIOXIDE 06/12/2025 30  20 - 32 mmol/L Final    ELECTROLYTE BALANCE 06/12/2025 10  7 - 17 mmol/L (calc) Final    CALCIUM 06/12/2025 8.9  8.6 - 10.4 mg/dL Final     PROTEIN, TOTAL 06/12/2025 6.9  6.1 - 8.1 g/dL Final    ALBUMIN 06/12/2025 4.5  3.6 - 5.1 g/dL Final    BILIRUBIN, TOTAL 06/12/2025 0.8  0.2 - 1.2 mg/dL Final    ALKALINE PHOSPHATASE 06/12/2025 79  37 - 153 U/L Final    AST 06/12/2025 15  10 - 35 U/L Final    ALT 06/12/2025 6  6 - 29 U/L Final    HEMOGLOBIN A1c 06/12/2025 6.0 (H)  <5.7 % Final    Comment: For someone without known diabetes, a hemoglobin   A1c value between 5.7% and 6.4% is consistent with  prediabetes and should be confirmed with a   follow-up test.     For someone with known diabetes, a value <7%  indicates that their diabetes is well controlled. A1c  targets should be individualized based on duration of  diabetes, age, comorbid conditions, and other  considerations.     This assay result is consistent with an increased risk  of diabetes.     Currently, no consensus exists regarding use of  hemoglobin A1c for diagnosis of diabetes for children.         eAG (mg/dL) 06/12/2025 126  mg/dL Final    eAG (mmol/L) 06/12/2025 7.0  mmol/L Final    T4, FREE 06/12/2025 1.7  0.8 - 1.8 ng/dL Final    TSH 06/12/2025 1.02  0.40 - 4.50 mIU/L Final       Assessment/Plan   Problem List Items Addressed This Visit       Acquired hypothyroidism    Well-controlled, continue current medications and management.         Relevant Orders    Follow Up In [facility] - PCP    Thyroid Stimulating Hormone    Thyroxine, Free    Chronic allergic rhinitis    We will have her take OTC Claritin 10 mg daily. Follow-up if persistent or worsening symptoms otherwise when necessary.         Chronic kidney disease, stage 3a (Multi)    Stable, continue current medications and management.         Relevant Orders    Follow Up In [facility] - PCP    Essential hypertension    Well-controlled, continue current medications and management.         Relevant Orders    CBC and Auto Differential    Comprehensive Metabolic Panel    Follow Up In [facility] - PCP     Hemoglobin A1C    Lipid Panel    Generalized anxiety disorder    Stable, continue current medications and management.  The risks and benefits of my recommendations, as well as other treatment options were discussed with the patient today.    The side effects of the medications were discussed.  Advised not to take the medication with alcohol .  Exercise regularly and this can give you a sense of well being and help decrease feelings of anxiety.;  Get plenty of sleep. Sleep rests your brain as well as your body, and can improve your general sense of wellbeing as well as your mood.;  Avoid alcohol and drug abuse. It may seem that alcohol or drugs relax you. But in the long run they make anxiety worse and cause more problems.;  Avoid caffeine. Caffeine is found in coffee, tea, soft drinks and chocolate. Caffeine may increase your sense of anxiety because it stimulates your nervous system. Also avoid over-the-counter diet pills, and cough and cold medicines that contain a decongestant..         Relevant Medications    ALPRAZolam (Xanax) 1 mg tablet    Other Relevant Orders    Follow Up In [facility] - PCP    Mallet finger of left finger(s)    Natural history and expected course discussed. Questions answered.  Follow-up if persistent or worsening symptoms otherwise when necessary.         Mixed hyperlipidemia    The nature of cardiac risk has been fully discussed with this patient. Discussed cardiovascular risk analysis and appropriate diet with the need for lifelong measures to reduce the risk. A regular exercise program is recommended to help achieve and maintain normal body weight, fitness and improve lipid balance. Patient education provided. They understand and agree with this course of treatment. They will return with new or worsening symptoms. Patient instructed to remain current with appropriate annual health maintenance.          Relevant Medications    atorvastatin (Lipitor) 20 mg tablet    Other  Relevant Orders    CBC and Auto Differential    Comprehensive Metabolic Panel    Follow Up In [facility] - PCP    Hemoglobin A1C    Lipid Panel    Type 2 diabetes mellitus with stage 3a chronic kidney disease, without long-term current use of insulin (Multi) - Primary    Diabetes Mellitus type II, under good control.  1. Rx changes: None  2. Education: Reviewed ‘ABCs’ of diabetes management (respective goals in parentheses):  A1C (<7), blood pressure (<130/80), and cholesterol (LDL <100).  3. Compliance at present is estimated to be good. Efforts to improve compliance (if necessary) will be directed at dietary modifications: and increased exercise.  4. Follow up: 3 months         Relevant Medications    metFORMIN  mg 24 hr tablet    Other Relevant Orders    CBC and Auto Differential    Comprehensive Metabolic Panel    Follow Up In [facility] - PCP    Hemoglobin A1C    Lipid Panel     OARRS:  Darius Quezada MD on 6/16/2025 11:35 AM  I have personally reviewed the OARRS report for Katelyn Weiss. I have considered the risks of abuse, dependence, addiction and diversion    Is the patient prescribed a combination of a benzodiazepine and opioid?  No    Last Urine Drug Screen / ordered today: No  Recent Results (from the past 8760 hours)   POCT waived urine drug screen manually resulted    Collection Time: 09/04/24 11:45 AM   Result Value Ref Range    POC THC Negative Negative, Not applicable ng/mL    POC Cocaine Negative Negative, Not applicable ng/mL    POC Opiates Negative Negative, Not applicable ng/mL    POC Amphetamine Negative Negative, Not applicable ng/mL    POC Phencyclidine (PCP) Negative Negative, Not applicable ng/mL    POC Barbiturates Negative Negative, Not applicable ng/mL    POC Benzodiazepines Positive (A) Negative, Not applicable ng/mL    POC Methamphetamine Negative Negative, Not applicable ng/mL    POC METHADONE MANUALLY ENTERED Negative Negative, Not applicable ng/mL     POC Ticyclic Antidepressants (TCA) Negative Negative, Not applicable ng/mL    POC Oxycodone Negative Negative, Not applicable ng/mL    POC MDMA URINE Negative Negative, Not applicable ng/mL    POC Morphine Urine Negative Negative, Not applicable ng/mL    POC Burprenorphine Urine Negative Negative, Not applicable ng/mL     Controlled Substance Agreement:  Date of the Last Agreement: 3/12/2025  Reviewed Controlled Substance Agreement including but not limited to the benefits, risks, and alternatives to treatment with a Controlled Substance medication(s).    Benzodiazepines:  What is the patient's goal of therapy? Able to function without being unduly anxious and carry out day-to-day activities without tension and panic attacks    Is this being achieved with current treatment? yes    JOSE-7:  Over the last 2 weeks, how often have you been bothered by any of the following problems?  Feeling nervous, anxious, or on edge: 0  Not being able to stop or control worryin  Worrying too much about different things: 0  Trouble relaxin  Being so restless that it is hard to sit still: 0  Becoming easily annoyed or irritable: 1  Feeling afraid as if something awful might happen: 0  JOSE-7 Total Score: 1    Activities of Daily Living:   Is your overall impression that this patient is benefiting (symptom reduction outweighs side effects) from benzodiazepine therapy? Yes     1. Physical Functioning: Better  2. Family Relationship: Better  3. Social Relationship: Better  4. Mood: Better  5. Sleep Patterns: Better  6. Overall Function: Better    Scribe Attestation  By signing my name below, IEze Scribe   attest that this documentation has been prepared under the direction and in the presence of Darius Quezada MD.

## 2025-06-16 NOTE — ASSESSMENT & PLAN NOTE
We will have her take OTC Claritin 10 mg daily. Follow-up if persistent or worsening symptoms otherwise when necessary.   No

## 2025-07-22 DIAGNOSIS — N18.31 TYPE 2 DIABETES MELLITUS WITH STAGE 3A CHRONIC KIDNEY DISEASE, WITHOUT LONG-TERM CURRENT USE OF INSULIN (MULTI): ICD-10-CM

## 2025-07-22 DIAGNOSIS — E11.22 TYPE 2 DIABETES MELLITUS WITH STAGE 3A CHRONIC KIDNEY DISEASE, WITHOUT LONG-TERM CURRENT USE OF INSULIN (MULTI): ICD-10-CM

## 2025-07-22 DIAGNOSIS — I10 ESSENTIAL HYPERTENSION: ICD-10-CM

## 2025-07-22 RX ORDER — NIFEDIPINE 30 MG/1
30 TABLET, FILM COATED, EXTENDED RELEASE ORAL DAILY
Qty: 90 TABLET | Refills: 2 | Status: SHIPPED | OUTPATIENT
Start: 2025-07-22

## 2025-07-22 RX ORDER — BLOOD SUGAR DIAGNOSTIC
STRIP MISCELLANEOUS
Qty: 300 STRIP | Refills: 3 | Status: SHIPPED | OUTPATIENT
Start: 2025-07-22

## 2025-07-22 NOTE — TELEPHONE ENCOUNTER
Rx Refill Request     Name: Katelyn Weiss  :  [phone]   Medication Name:  OneTouch Ultra Test strip   Specific Pharmacy location:  [facility]  Date of last appointment:  2025   Date of next appointment:  2025   Best number to reach patient:  [phone]

## 2025-07-22 NOTE — TELEPHONE ENCOUNTER
Rx Refill Request Telephone Encounter    Name:  Katelyn Weiss  :  [id number]  Medication Name:  NIFEDIPINE 30MG  Specific Pharmacy location:  [facility]   Date of last appointment:  25  Date of next appointment: 25  Best number to reach patient:  [phone]    KATELYN STATED THAT SHE IS DOWN TO HER LAST FEW PILLS AND NEEDS THIS REFILL BEFORE HER VACATION THIS WEEKEND.

## 2025-08-21 DIAGNOSIS — K21.9 GASTROESOPHAGEAL REFLUX DISEASE WITHOUT ESOPHAGITIS: ICD-10-CM

## 2025-08-21 RX ORDER — PANTOPRAZOLE SODIUM 40 MG/1
40 TABLET, DELAYED RELEASE ORAL DAILY
Qty: 90 TABLET | Refills: 1 | Status: SHIPPED | OUTPATIENT
Start: 2025-08-21

## 2025-08-30 DIAGNOSIS — I10 ESSENTIAL HYPERTENSION: ICD-10-CM

## 2025-08-30 DIAGNOSIS — E78.2 MIXED HYPERLIPIDEMIA: ICD-10-CM

## 2025-08-30 DIAGNOSIS — E03.9 ACQUIRED HYPOTHYROIDISM: ICD-10-CM

## 2025-08-30 DIAGNOSIS — N18.31 TYPE 2 DIABETES MELLITUS WITH STAGE 3A CHRONIC KIDNEY DISEASE, WITHOUT LONG-TERM CURRENT USE OF INSULIN (MULTI): ICD-10-CM

## 2025-08-30 DIAGNOSIS — E11.22 TYPE 2 DIABETES MELLITUS WITH STAGE 3A CHRONIC KIDNEY DISEASE, WITHOUT LONG-TERM CURRENT USE OF INSULIN (MULTI): ICD-10-CM

## 2025-09-16 ENCOUNTER — APPOINTMENT (OUTPATIENT)
Dept: PRIMARY CARE | Facility: CLINIC | Age: OVER 89
End: 2025-09-16
Payer: MEDICARE

## 2025-11-11 ENCOUNTER — APPOINTMENT (OUTPATIENT)
Dept: PRIMARY CARE | Facility: CLINIC | Age: OVER 89
End: 2025-11-11
Payer: MEDICARE